# Patient Record
Sex: FEMALE | Race: WHITE | NOT HISPANIC OR LATINO | URBAN - METROPOLITAN AREA
[De-identification: names, ages, dates, MRNs, and addresses within clinical notes are randomized per-mention and may not be internally consistent; named-entity substitution may affect disease eponyms.]

---

## 2018-09-07 ENCOUNTER — INPATIENT (INPATIENT)
Facility: HOSPITAL | Age: 53
LOS: 3 days | Discharge: PSYCHIATRIC OR SUBSTANCE USE FACILITY | End: 2018-09-11
Attending: FAMILY MEDICINE | Admitting: FAMILY MEDICINE
Payer: MEDICARE

## 2018-09-07 VITALS — HEIGHT: 65 IN | WEIGHT: 149.91 LBS

## 2018-09-07 DIAGNOSIS — Z92.3 PERSONAL HISTORY OF IRRADIATION: Chronic | ICD-10-CM

## 2018-09-07 DIAGNOSIS — F25.0 SCHIZOAFFECTIVE DISORDER, BIPOLAR TYPE: ICD-10-CM

## 2018-09-07 LAB
ABO RH CONFIRMATION: SIGNIFICANT CHANGE UP
ACETONE SERPL-MCNC: ABNORMAL
ALBUMIN SERPL ELPH-MCNC: 3.6 G/DL — SIGNIFICANT CHANGE UP (ref 3.3–5)
ALP SERPL-CCNC: 418 U/L — HIGH (ref 40–120)
ALT FLD-CCNC: 123 U/L — HIGH (ref 12–78)
AMPHET UR-MCNC: NEGATIVE — SIGNIFICANT CHANGE UP
ANION GAP SERPL CALC-SCNC: 11 MMOL/L — SIGNIFICANT CHANGE UP (ref 5–17)
ANION GAP SERPL CALC-SCNC: 18 MMOL/L — HIGH (ref 5–17)
APPEARANCE UR: CLEAR — SIGNIFICANT CHANGE UP
APTT BLD: 26.6 SEC — LOW (ref 27.5–37.4)
AST SERPL-CCNC: 69 U/L — HIGH (ref 15–37)
BACTERIA # UR AUTO: ABNORMAL
BARBITURATES UR SCN-MCNC: NEGATIVE — SIGNIFICANT CHANGE UP
BASE EXCESS BLDV CALC-SCNC: -7.5 MMOL/L — LOW (ref -2–2)
BASOPHILS # BLD AUTO: 0.03 K/UL — SIGNIFICANT CHANGE UP (ref 0–0.2)
BASOPHILS NFR BLD AUTO: 0.5 % — SIGNIFICANT CHANGE UP (ref 0–2)
BENZODIAZ UR-MCNC: NEGATIVE — SIGNIFICANT CHANGE UP
BILIRUB SERPL-MCNC: 0.5 MG/DL — SIGNIFICANT CHANGE UP (ref 0.2–1.2)
BILIRUB UR-MCNC: NEGATIVE — SIGNIFICANT CHANGE UP
BLD GP AB SCN SERPL QL: SIGNIFICANT CHANGE UP
BUN SERPL-MCNC: 11 MG/DL — SIGNIFICANT CHANGE UP (ref 7–23)
BUN SERPL-MCNC: 9 MG/DL — SIGNIFICANT CHANGE UP (ref 7–23)
CALCIUM SERPL-MCNC: 8.2 MG/DL — LOW (ref 8.5–10.1)
CALCIUM SERPL-MCNC: 9.7 MG/DL — SIGNIFICANT CHANGE UP (ref 8.5–10.1)
CHLORIDE SERPL-SCNC: 103 MMOL/L — SIGNIFICANT CHANGE UP (ref 96–108)
CHLORIDE SERPL-SCNC: 111 MMOL/L — HIGH (ref 96–108)
CK SERPL-CCNC: 48 U/L — SIGNIFICANT CHANGE UP (ref 26–192)
CO2 SERPL-SCNC: 15 MMOL/L — LOW (ref 22–31)
CO2 SERPL-SCNC: 20 MMOL/L — LOW (ref 22–31)
COCAINE METAB.OTHER UR-MCNC: NEGATIVE — SIGNIFICANT CHANGE UP
COLOR SPEC: YELLOW — SIGNIFICANT CHANGE UP
CREAT SERPL-MCNC: 0.54 MG/DL — SIGNIFICANT CHANGE UP (ref 0.5–1.3)
CREAT SERPL-MCNC: 0.73 MG/DL — SIGNIFICANT CHANGE UP (ref 0.5–1.3)
DIFF PNL FLD: NEGATIVE — SIGNIFICANT CHANGE UP
EOSINOPHIL # BLD AUTO: 0.03 K/UL — SIGNIFICANT CHANGE UP (ref 0–0.5)
EOSINOPHIL NFR BLD AUTO: 0.5 % — SIGNIFICANT CHANGE UP (ref 0–6)
EPI CELLS # UR: SIGNIFICANT CHANGE UP
ETHANOL SERPL-MCNC: <10 MG/DL — SIGNIFICANT CHANGE UP (ref 0–10)
GLUCOSE BLDC GLUCOMTR-MCNC: 152 MG/DL — HIGH (ref 70–99)
GLUCOSE SERPL-MCNC: 224 MG/DL — HIGH (ref 70–99)
GLUCOSE SERPL-MCNC: 388 MG/DL — HIGH (ref 70–99)
GLUCOSE UR QL: 1000 MG/DL
GRAN CASTS # UR COMP ASSIST: ABNORMAL /LPF
HCO3 BLDV-SCNC: 16 MMOL/L — LOW (ref 21–29)
HCT VFR BLD CALC: 40.6 % — SIGNIFICANT CHANGE UP (ref 34.5–45)
HGB BLD-MCNC: 13.7 G/DL — SIGNIFICANT CHANGE UP (ref 11.5–15.5)
HYALINE CASTS # UR AUTO: ABNORMAL /LPF
IMM GRANULOCYTES NFR BLD AUTO: 0.5 % — SIGNIFICANT CHANGE UP (ref 0–1.5)
INR BLD: 1.02 RATIO — SIGNIFICANT CHANGE UP (ref 0.88–1.16)
KETONES UR-MCNC: ABNORMAL
LEUKOCYTE ESTERASE UR-ACNC: ABNORMAL
LYMPHOCYTES # BLD AUTO: 0.93 K/UL — LOW (ref 1–3.3)
LYMPHOCYTES # BLD AUTO: 16.1 % — SIGNIFICANT CHANGE UP (ref 13–44)
MCHC RBC-ENTMCNC: 29.5 PG — SIGNIFICANT CHANGE UP (ref 27–34)
MCHC RBC-ENTMCNC: 33.7 GM/DL — SIGNIFICANT CHANGE UP (ref 32–36)
MCV RBC AUTO: 87.5 FL — SIGNIFICANT CHANGE UP (ref 80–100)
METHADONE UR-MCNC: NEGATIVE — SIGNIFICANT CHANGE UP
MONOCYTES # BLD AUTO: 0.32 K/UL — SIGNIFICANT CHANGE UP (ref 0–0.9)
MONOCYTES NFR BLD AUTO: 5.5 % — SIGNIFICANT CHANGE UP (ref 2–14)
NEUTROPHILS # BLD AUTO: 4.44 K/UL — SIGNIFICANT CHANGE UP (ref 1.8–7.4)
NEUTROPHILS NFR BLD AUTO: 76.9 % — SIGNIFICANT CHANGE UP (ref 43–77)
NITRITE UR-MCNC: NEGATIVE — SIGNIFICANT CHANGE UP
NRBC # BLD: 0 /100 WBCS — SIGNIFICANT CHANGE UP (ref 0–0)
OPIATES UR-MCNC: NEGATIVE — SIGNIFICANT CHANGE UP
PCO2 BLDV: 28 MMHG — LOW (ref 35–50)
PCP SPEC-MCNC: SIGNIFICANT CHANGE UP
PCP UR-MCNC: NEGATIVE — SIGNIFICANT CHANGE UP
PH BLDV: 7.37 — SIGNIFICANT CHANGE UP (ref 7.35–7.45)
PH UR: 5 — SIGNIFICANT CHANGE UP (ref 5–8)
PLATELET # BLD AUTO: 216 K/UL — SIGNIFICANT CHANGE UP (ref 150–400)
PO2 BLDV: 54 MMHG — HIGH (ref 25–45)
POTASSIUM SERPL-MCNC: 3.9 MMOL/L — SIGNIFICANT CHANGE UP (ref 3.5–5.3)
POTASSIUM SERPL-MCNC: 4 MMOL/L — SIGNIFICANT CHANGE UP (ref 3.5–5.3)
POTASSIUM SERPL-SCNC: 3.9 MMOL/L — SIGNIFICANT CHANGE UP (ref 3.5–5.3)
POTASSIUM SERPL-SCNC: 4 MMOL/L — SIGNIFICANT CHANGE UP (ref 3.5–5.3)
PROT SERPL-MCNC: 8.6 GM/DL — HIGH (ref 6–8.3)
PROT UR-MCNC: 100 MG/DL
PROTHROM AB SERPL-ACNC: 11 SEC — SIGNIFICANT CHANGE UP (ref 9.8–12.7)
RBC # BLD: 4.64 M/UL — SIGNIFICANT CHANGE UP (ref 3.8–5.2)
RBC # FLD: 13.5 % — SIGNIFICANT CHANGE UP (ref 10.3–14.5)
RBC CASTS # UR COMP ASSIST: SIGNIFICANT CHANGE UP /HPF (ref 0–4)
SAO2 % BLDV: 86 % — SIGNIFICANT CHANGE UP (ref 67–88)
SODIUM SERPL-SCNC: 136 MMOL/L — SIGNIFICANT CHANGE UP (ref 135–145)
SODIUM SERPL-SCNC: 142 MMOL/L — SIGNIFICANT CHANGE UP (ref 135–145)
SP GR SPEC: 1.02 — SIGNIFICANT CHANGE UP (ref 1.01–1.02)
THC UR QL: NEGATIVE — SIGNIFICANT CHANGE UP
TROPONIN I SERPL-MCNC: <0.015 NG/ML — SIGNIFICANT CHANGE UP (ref 0.01–0.04)
TYPE + AB SCN PNL BLD: SIGNIFICANT CHANGE UP
UROBILINOGEN FLD QL: NEGATIVE MG/DL — SIGNIFICANT CHANGE UP
VALPROATE SERPL-MCNC: <3 UG/ML — LOW (ref 50–100)
WBC # BLD: 5.78 K/UL — SIGNIFICANT CHANGE UP (ref 3.8–10.5)
WBC # FLD AUTO: 5.78 K/UL — SIGNIFICANT CHANGE UP (ref 3.8–10.5)
WBC UR QL: SIGNIFICANT CHANGE UP

## 2018-09-07 PROCEDURE — 90792 PSYCH DIAG EVAL W/MED SRVCS: CPT

## 2018-09-07 PROCEDURE — 70450 CT HEAD/BRAIN W/O DYE: CPT | Mod: 26

## 2018-09-07 PROCEDURE — 99285 EMERGENCY DEPT VISIT HI MDM: CPT

## 2018-09-07 PROCEDURE — 71045 X-RAY EXAM CHEST 1 VIEW: CPT | Mod: 26

## 2018-09-07 RX ORDER — SODIUM CHLORIDE 9 MG/ML
1000 INJECTION INTRAMUSCULAR; INTRAVENOUS; SUBCUTANEOUS ONCE
Qty: 0 | Refills: 0 | Status: COMPLETED | OUTPATIENT
Start: 2018-09-07 | End: 2018-09-07

## 2018-09-07 RX ORDER — SODIUM CHLORIDE 9 MG/ML
3 INJECTION INTRAMUSCULAR; INTRAVENOUS; SUBCUTANEOUS ONCE
Qty: 0 | Refills: 0 | Status: COMPLETED | OUTPATIENT
Start: 2018-09-07 | End: 2018-09-07

## 2018-09-07 RX ORDER — HALOPERIDOL DECANOATE 100 MG/ML
5 INJECTION INTRAMUSCULAR EVERY 6 HOURS
Qty: 0 | Refills: 0 | Status: DISCONTINUED | OUTPATIENT
Start: 2018-09-07 | End: 2018-09-11

## 2018-09-07 RX ORDER — SODIUM CHLORIDE 9 MG/ML
2000 INJECTION INTRAMUSCULAR; INTRAVENOUS; SUBCUTANEOUS ONCE
Qty: 0 | Refills: 0 | Status: COMPLETED | OUTPATIENT
Start: 2018-09-07 | End: 2018-09-07

## 2018-09-07 RX ORDER — INSULIN LISPRO 100/ML
10 VIAL (ML) SUBCUTANEOUS ONCE
Qty: 0 | Refills: 0 | Status: COMPLETED | OUTPATIENT
Start: 2018-09-07 | End: 2018-09-07

## 2018-09-07 RX ORDER — DIPHENHYDRAMINE HCL 50 MG
50 CAPSULE ORAL EVERY 6 HOURS
Qty: 0 | Refills: 0 | Status: DISCONTINUED | OUTPATIENT
Start: 2018-09-07 | End: 2018-09-11

## 2018-09-07 RX ADMIN — SODIUM CHLORIDE 1000 MILLILITER(S): 9 INJECTION INTRAMUSCULAR; INTRAVENOUS; SUBCUTANEOUS at 16:30

## 2018-09-07 RX ADMIN — SODIUM CHLORIDE 2000 MILLILITER(S): 9 INJECTION INTRAMUSCULAR; INTRAVENOUS; SUBCUTANEOUS at 13:54

## 2018-09-07 RX ADMIN — SODIUM CHLORIDE 1000 MILLILITER(S): 9 INJECTION INTRAMUSCULAR; INTRAVENOUS; SUBCUTANEOUS at 17:30

## 2018-09-07 RX ADMIN — Medication 10 UNIT(S): at 15:04

## 2018-09-07 RX ADMIN — SODIUM CHLORIDE 2000 MILLILITER(S): 9 INJECTION INTRAMUSCULAR; INTRAVENOUS; SUBCUTANEOUS at 14:54

## 2018-09-07 NOTE — H&P ADULT - PSH
S/P radiotherapy S/P radiotherapy    Status post chemotherapy  h/o chemoport insertion at left sided abdomen underneath skin

## 2018-09-07 NOTE — ED BEHAVIORAL HEALTH ASSESSMENT NOTE - HPI (INCLUDE ILLNESS QUALITY, SEVERITY, DURATION, TIMING, CONTEXT, MODIFYING FACTORS, ASSOCIATED SIGNS AND SYMPTOMS)
Pt is 51 y/o female with hx of  Schizoaffective disorder, bipolr type,  noncompliant with Depakote ER 250mg 1 tab TID, , haldol Dec injection 50mg IM last injection on 8/16/18, and before that 6/22/ (missed a month),   followed by Dr Reza [phone] (called office, left a message), Spoke with Daysi, clinical director of [facility]. Pt has hx of noncompliance, she is psychotic, noncompliant, excessive spending, Buddhist preoccupation as her baseline.   Medical hx is significant for DM, stage 4 colon cancer new mets to liver with liver enzymes increased.  Per pt's sister, pt is paranoid that people are watching her, she is telling lies, has not been taking care of her body for 2-3 weeks, is delusional, and is feeling tired. Pt has been non-compliant with her metformin for the past month.  Pt denies any acute complaints at this time. She is expansive grandiose, laughing uncontrollably, but responds well to redirection.   She states that she belongs to special  type of women called "medium". That is the morgan kind of women. She is paranoid and endorses hearing voices of her decreased mother, grandmother, also Sheridan and Laura Mueller. Denies CAH. Pt states that she met in the past, but also that she is not Buddhist.   She states meds she takes are  "spookolishes" and "chocoladishes".   Pt denies ever having SI, or SA, no HI.   Multiple hospitalizations at least 6 x on psychiatry, in [facility],. [facility], [facility]. last hospitalizations in 2013. After that in treatment in [facility] and refereed to [facility] where is she in treatement now.   Therapist is Daysi Munson [phone]. 'family learned about liver metastases yesterday,.   OPT resides with her 22 YO son with Down sy and 16 YO son. her  is with children.

## 2018-09-07 NOTE — ED BEHAVIORAL HEALTH ASSESSMENT NOTE - NS ED [facility] PLAN ADMIT TO [facility] CONTACTED FT
Called [facility],  Dr Reza not available, case discussed width Daysi clinical director.

## 2018-09-07 NOTE — ED ADULT NURSE NOTE - OBJECTIVE STATEMENT
Pt presents to the Ed with [employer] for psych eval Pt presents to the Ed with [employer] for psych eval. pt's sister contacted the police  because pt stopped taking her psych meds.

## 2018-09-07 NOTE — ED BEHAVIORAL HEALTH ASSESSMENT NOTE - RISK ASSESSMENT
Pt is not at imminent risk to harm self  and others. However she is psychotic and manic and can not care for herself.

## 2018-09-07 NOTE — H&P ADULT - HISTORY OF PRESENT ILLNESS
53 y/o F w/Schizoaffective disorder bipolar type, DM2, Stage 4 colon cancer with mets to the liver, corneal ulcer was brought to the ED by law enforcement with the complaints of erratic behaviour, hostility and aggressiveness towards . As per pt she checked her blood glucose and was 345 at home and is the only reason she is here.   As per , pt stopped taking all her medications and stopped following up with oncologist and endocrinologist because she believed she has been cured and has no medical issues. Today, pt locked  out and was aggressive to herself and family and wouldn't let anyone near. Pt agrees to been cured of all medical conditions and endorses auditory hallucinations which she describes as jessi voices telling her stories. Pt have inappropriate laughters which she relates to the stories her dead family are telling her as I was in the room. Pt denies headaches, blurry vision or vision changes, CP, SOB, palpitations, abdominal pain, fever, chills, N/V, diarrhea or constipation dysuria, urgency. Pt reports increase urinary frequency which has been ongoing for years.    In the ED, pt is s/p 3L NS bolus, benadryl, haloperidol, humalog.   Labs, CT scan and CXR were obtained. 51 y/o F w/Schizoaffective disorder bipolar type, DM2, Stage 4 colon cancer with new mets to the liver s/p chemo 5yrs ago and radiation, corneal ulcer was brought to the ED by law enforcement with the complaints of erratic behaviour, hostility and aggressiveness towards . As per pt she checked her blood glucose and was 345 at home and is the only reason she is here.   As per , pt stopped taking all her medications and stopped following up with oncologist and endocrinologist because she believed she has been cured and has no medical issues. Today, pt locked  out and was aggressive to herself and family and wouldn't let anyone near. Pt agrees to been cured of all medical conditions and endorses auditory hallucinations which she describes as jessi voices telling her stories. Pt have inappropriate laughters which she relates to the stories her dead family are telling her as I was in the room. Pt denies headaches, blurry vision or vision changes, CP, SOB, palpitations, abdominal pain, fever, chills, N/V, diarrhea or constipation dysuria, urgency. Pt reports increase urinary frequency which has been ongoing for years.    In the ED, pt is s/p 3L NS bolus, Depakote, humalog.   Labs, CT scan and CXR were obtained.

## 2018-09-07 NOTE — ED BEHAVIORAL HEALTH ASSESSMENT NOTE - DETAILS
22 YO SON with Down syndrome Mother had schizophrenia C&L team adiel f/u Coordinated with ER attending

## 2018-09-07 NOTE — H&P ADULT - NSHPOUTPATIENTPROVIDERS_GEN_ALL_CORE
PCP: Dr. Reza PCP: Dr. Reza  Oncologist: Dr. Bueno at [facility]  Endo: Dr. Almendarez PCP: Dr. Reza  Oncologist: Dr. Bueno at [facility]  Endo: Dr. Almendarez

## 2018-09-07 NOTE — H&P ADULT - ATTENDING COMMENTS
Patient seen and examined with resident physician Mala Mora. I personally had a face-to-face encounter with the patient, examined the patient myself and reviewed the plan of care with the patient and Resident Mala Mora. I agree with the assessment and plan of Resident Mala Mora as stated and discussed.    This is a 52 y.o. female with PMH schizoaffective disorder bipolar type, DM2, Stage 4 colon cancer with mets to the liver, corneal ulcer was brought to the ED by law enforcement with the complaints of erratic behaviour, hostility and aggressiveness towards .     #mild DKA-resolved  #DM2 uncontrolled hyperglycemia  #medication noncompliance  -admit to med surg  -iv hydration  -fingerstick monitoring and ISS  -check A1C  -hold glimepiride  -pt states on levemir 60 units. Appears high dose as pt's BGM decreased from 388 to 152 with lispro 10 units. Will hold levemir. Cont sliding scale.    #stage IV colon cancer with mets to liver, transaminitis  -trend LRTs  -onc consult, Dr George    #schizoaffective disorder, bipolar type  #auditory hallucination  -cont 1:1 constant observation  -cont depakote  -pt placed on benadryl, haldol, and ativan PRN by psych  -f/u psych recommendations  -EKG: NSR HR 75, LVH, QTc 455

## 2018-09-07 NOTE — ED BEHAVIORAL HEALTH ASSESSMENT NOTE - SUMMARY
52 YOWW with schizoaffective disorder bipolar Type. with colon cancer 4th stage with newly discovered liver mets, presents to ER psychotic, manic , in the light of noncompliance and partial compliance. She gets haldol dec injection and last one was on 8/16/18, but she is not compliant with other meds.   PT is not suicidal or homicidal. But she si disorganized and cannot care for herself.   care coordinated withy ER attending. They are still completing medical workup and will admit pt to medicine. As per FSL pt presents the same way for at least 1 year and the only change in behavior is that she stopped attending her group psychotherapy sessions.       [facility] plan is astrid keep pt on 1;1 and continue haldol dec. next one is due on 9/13/18.  Would do CT head and check ammonia level.     C&L will f/u

## 2018-09-07 NOTE — H&P ADULT - NEGATIVE MUSCULOSKELETAL SYMPTOMS
no joint swelling/no leg pain L/no arthralgia/no muscle weakness/no back pain/no leg pain R/no muscle cramps/no stiffness/no neck pain/no arm pain L/no arm pain R

## 2018-09-07 NOTE — ED PROVIDER NOTE - OBJECTIVE STATEMENT
53 y/o female with PMHX of bipolar disorder, DM, stage 4 colon cancer presents to the ED regarding erratic behavior. Per pt's sister, pt is paranoid that people are watching her, is telling lies, has not been taking care of her body for 2-3 weeks, is delusional, and is feeling tired. Pt has been non-compliant with her insulin for the past month. Pt's sister is not sure if pt has been compliant with her Depakote. Pt's sister states that pt's BGM was 345 today. Pt denies any acute complaints at this time. PMD: Dr. Reza.

## 2018-09-07 NOTE — ED ADULT TRIAGE NOTE - CHIEF COMPLAINT QUOTE
Patient presents with [employer] and EMS reports erratic behavior, sister reports she thinks she has stopped taking her psych meds. Patient in handcuffs at triage, unable to obtain vital signs

## 2018-09-07 NOTE — H&P ADULT - FAMILY HISTORY
Mother  Still living? No  Family history of schizophrenia, Age at diagnosis: Age Unknown  Family history of colon cancer in mother, Age at diagnosis: Age Unknown     Father  Still living? Yes, Estimated age: Age Unknown  Family history of heart disease, Age at diagnosis: Age Unknown     Child  Still living? Yes, Estimated age: Age Unknown  Family history of Down syndrome, Age at diagnosis: Age Unknown  Family history of autism, Age at diagnosis: Age Unknown

## 2018-09-07 NOTE — ED BEHAVIORAL HEALTH ASSESSMENT NOTE - AXIS III
Medical hx is significant for DM, stage 4 colon cancer new mets to liver with liver enzymes increased.

## 2018-09-07 NOTE — H&P ADULT - NEGATIVE NEUROLOGICAL SYMPTOMS
no transient paralysis/no weakness/no loss of sensation/no headache/no confusion/no difficulty walking

## 2018-09-07 NOTE — H&P ADULT - PMH
Colon cancer  Stage 4 with liver mets  Corneal ulcer    Diabetes mellitus    Schizoaffective disorder, bipolar type

## 2018-09-07 NOTE — H&P ADULT - ASSESSMENT
#Admit to Med/Surg    #Hyperglycemia 2/2 medication non compliance for DM  - UA positive for large ketones and glucose  - S/p 3L NS bolus  - S/p Humalog 10units  - Continue Insulin  - Continue ISS  - F/u HbA1c  - F/u CBC, CMP.   - POCT glucose checks   - Continue to monitor    #Schizoaffective disorder bipolar type  - Uncontrolled (pt in active psychosis) 2/2 medication non compliance  - Valproic acid level <3  - Utox negative  - Psych consult appreciated  - CT head was negative  - F/u Ammonia levels am  - Continue constant observation with 1:1.  - Continue to F/u psych consult  - Continue Haldol 5mg q6h PRN  - Continue Ativan 2mg q6h PRN  - Continue Depakote 750mg daily  - Continue Haldol 50mg inj monthly  - Continue to monitor    #Elevated LFTs  - Has stage 4 Colon Cancer with new liver mets (follows up as [facility] but believes she is cured and does not follow up anymore)  - F/u oncology consult  - F/u palliative consult    #DVT Prophylaxis  - heparin sq    IMPROVE VTE Individual Risk Assessment  RISK                                                                Points  [  ] Previous VTE                                                  3  [  ] Thrombophilia                                               2  [  ] Lower limb paralysis                                      2        (unable to hold up >15 seconds)    [*] Current Cancer                                              2         (within 6 months)  [  ] Immobilization > 24 hrs                                1  [  ] ICU/CCU stay > 24 hours                              1  [  ] Age > 60                                                      1  IMPROVE VTE Score __2_______ #Admit to Med/Surg    #Hyperglycemia 2/2 medication non compliance for DM  - UA positive for large ketones and glucose  - S/p 3L NS bolus  - S/p Humalog 10units  - Clarify Levemir dose in AM  - Continue ISS  - F/u HbA1c  - F/u CBC, CMP.   - POCT glucose checks   - Continue to monitor    #Schizoaffective disorder bipolar type  - Uncontrolled (pt in active psychosis) 2/2 medication non compliance  - Valproic acid level <3  - Utox negative  - Psych consult appreciated  - CT head was negative  - F/u Ammonia levels am  - Continue constant observation with 1:1.  - Continue to F/u psych consult  - Continue Haldol, Ativan and benadryl PRN as per psych.   - Continue Depakote 750mg daily  - Continue Haldol 50mg inj monthly  - Continue to monitor    #Elevated LFTs  - Has stage 4 Colon Cancer with new liver mets (follows up as [facility] but believes she is cured and does not follow up anymore)  - F/u oncology consult  - F/u palliative consult    #DVT Prophylaxis  - heparin sq    IMPROVE VTE Individual Risk Assessment  RISK                                                                Points  [  ] Previous VTE                                                  3  [  ] Thrombophilia                                               2  [  ] Lower limb paralysis                                      2        (unable to hold up >15 seconds)    [*] Current Cancer                                              2         (within 6 months)  [  ] Immobilization > 24 hrs                                1  [  ] ICU/CCU stay > 24 hours                              1  [  ] Age > 60                                                      1  IMPROVE VTE Score __2_______

## 2018-09-07 NOTE — H&P ADULT - RS GEN PE MLT RESP DETAILS PC
good air movement/normal/respirations non-labored/breath sounds equal/airway patent/clear to auscultation bilaterally

## 2018-09-07 NOTE — H&P ADULT - NSHPSOCIALHISTORY_GEN_ALL_CORE
, live w/ and 2 sons.  worked as a  for [employer] for 24yrs. Currently unemployed.  Nonsmoker, non alcoholic, no illicit drugs.

## 2018-09-07 NOTE — H&P ADULT - NEGATIVE OPHTHALMOLOGIC SYMPTOMS
no blurred vision L/no pain L/no irritation L/no irritation R/no loss of vision R/no diplopia/no blurred vision R/no discharge L/no pain R/no loss of vision L/no scleral injection L/no scleral injection R

## 2018-09-07 NOTE — H&P ADULT - NEGATIVE GASTROINTESTINAL SYMPTOMS
no diarrhea/no change in bowel habits/no melena/no jaundice/no abdominal pain/no hematochezia/no nausea/no vomiting/no constipation

## 2018-09-07 NOTE — ED BEHAVIORAL HEALTH ASSESSMENT NOTE - CURRENT MEDICATION
noncompliant with depakote ER 250mg PO TID and on haldol Dec injection 50mg IM last injection on 8/16/18, and before that 6/22/ (missed a month),

## 2018-09-07 NOTE — H&P ADULT - MUSCULOSKELETAL
details… detailed exam ROM intact/no joint erythema/no joint swelling/no joint warmth/no calf tenderness/normal strength

## 2018-09-07 NOTE — ED BEHAVIORAL HEALTH ASSESSMENT NOTE - DESCRIPTION
Pt is cooperative, but psychotic, inappropriate  laughing, in denial of her medical condition,. see HPI resides with 2 sons; one with Down sy

## 2018-09-08 DIAGNOSIS — Z92.21 PERSONAL HISTORY OF ANTINEOPLASTIC CHEMOTHERAPY: Chronic | ICD-10-CM

## 2018-09-08 LAB
ALBUMIN SERPL ELPH-MCNC: 2.8 G/DL — LOW (ref 3.3–5)
ALP SERPL-CCNC: 314 U/L — HIGH (ref 40–120)
ALT FLD-CCNC: 96 U/L — HIGH (ref 12–78)
AMMONIA BLD-MCNC: 19 UMOL/L — SIGNIFICANT CHANGE UP (ref 11–32)
ANION GAP SERPL CALC-SCNC: 12 MMOL/L — SIGNIFICANT CHANGE UP (ref 5–17)
ANION GAP SERPL CALC-SCNC: 14 MMOL/L — SIGNIFICANT CHANGE UP (ref 5–17)
AST SERPL-CCNC: 67 U/L — HIGH (ref 15–37)
BASOPHILS # BLD AUTO: 0.03 K/UL — SIGNIFICANT CHANGE UP (ref 0–0.2)
BASOPHILS NFR BLD AUTO: 0.6 % — SIGNIFICANT CHANGE UP (ref 0–2)
BILIRUB SERPL-MCNC: 0.4 MG/DL — SIGNIFICANT CHANGE UP (ref 0.2–1.2)
BUN SERPL-MCNC: 8 MG/DL — SIGNIFICANT CHANGE UP (ref 7–23)
BUN SERPL-MCNC: 8 MG/DL — SIGNIFICANT CHANGE UP (ref 7–23)
CALCIUM SERPL-MCNC: 8.5 MG/DL — SIGNIFICANT CHANGE UP (ref 8.5–10.1)
CALCIUM SERPL-MCNC: 9.2 MG/DL — SIGNIFICANT CHANGE UP (ref 8.5–10.1)
CHLORIDE SERPL-SCNC: 106 MMOL/L — SIGNIFICANT CHANGE UP (ref 96–108)
CHLORIDE SERPL-SCNC: 107 MMOL/L — SIGNIFICANT CHANGE UP (ref 96–108)
CO2 SERPL-SCNC: 17 MMOL/L — LOW (ref 22–31)
CO2 SERPL-SCNC: 19 MMOL/L — LOW (ref 22–31)
CREAT SERPL-MCNC: 0.5 MG/DL — SIGNIFICANT CHANGE UP (ref 0.5–1.3)
CREAT SERPL-MCNC: 0.66 MG/DL — SIGNIFICANT CHANGE UP (ref 0.5–1.3)
EOSINOPHIL # BLD AUTO: 0.06 K/UL — SIGNIFICANT CHANGE UP (ref 0–0.5)
EOSINOPHIL NFR BLD AUTO: 1.3 % — SIGNIFICANT CHANGE UP (ref 0–6)
GLUCOSE BLDC GLUCOMTR-MCNC: 232 MG/DL — HIGH (ref 70–99)
GLUCOSE BLDC GLUCOMTR-MCNC: 257 MG/DL — HIGH (ref 70–99)
GLUCOSE BLDC GLUCOMTR-MCNC: 339 MG/DL — HIGH (ref 70–99)
GLUCOSE BLDC GLUCOMTR-MCNC: 394 MG/DL — HIGH (ref 70–99)
GLUCOSE SERPL-MCNC: 214 MG/DL — HIGH (ref 70–99)
GLUCOSE SERPL-MCNC: 407 MG/DL — HIGH (ref 70–99)
HBA1C BLD-MCNC: 13.9 % — HIGH (ref 4–5.6)
HCT VFR BLD CALC: 34 % — LOW (ref 34.5–45)
HGB BLD-MCNC: 11.4 G/DL — LOW (ref 11.5–15.5)
IMM GRANULOCYTES NFR BLD AUTO: 0.6 % — SIGNIFICANT CHANGE UP (ref 0–1.5)
LYMPHOCYTES # BLD AUTO: 1.46 K/UL — SIGNIFICANT CHANGE UP (ref 1–3.3)
LYMPHOCYTES # BLD AUTO: 30.6 % — SIGNIFICANT CHANGE UP (ref 13–44)
MCHC RBC-ENTMCNC: 30.2 PG — SIGNIFICANT CHANGE UP (ref 27–34)
MCHC RBC-ENTMCNC: 33.5 GM/DL — SIGNIFICANT CHANGE UP (ref 32–36)
MCV RBC AUTO: 89.9 FL — SIGNIFICANT CHANGE UP (ref 80–100)
MONOCYTES # BLD AUTO: 0.32 K/UL — SIGNIFICANT CHANGE UP (ref 0–0.9)
MONOCYTES NFR BLD AUTO: 6.7 % — SIGNIFICANT CHANGE UP (ref 2–14)
NEUTROPHILS # BLD AUTO: 2.87 K/UL — SIGNIFICANT CHANGE UP (ref 1.8–7.4)
NEUTROPHILS NFR BLD AUTO: 60.2 % — SIGNIFICANT CHANGE UP (ref 43–77)
NRBC # BLD: 0 /100 WBCS — SIGNIFICANT CHANGE UP (ref 0–0)
PLATELET # BLD AUTO: 193 K/UL — SIGNIFICANT CHANGE UP (ref 150–400)
POTASSIUM SERPL-MCNC: 3.6 MMOL/L — SIGNIFICANT CHANGE UP (ref 3.5–5.3)
POTASSIUM SERPL-MCNC: 3.7 MMOL/L — SIGNIFICANT CHANGE UP (ref 3.5–5.3)
POTASSIUM SERPL-SCNC: 3.6 MMOL/L — SIGNIFICANT CHANGE UP (ref 3.5–5.3)
POTASSIUM SERPL-SCNC: 3.7 MMOL/L — SIGNIFICANT CHANGE UP (ref 3.5–5.3)
PROT SERPL-MCNC: 6.7 GM/DL — SIGNIFICANT CHANGE UP (ref 6–8.3)
RBC # BLD: 3.78 M/UL — LOW (ref 3.8–5.2)
RBC # FLD: 13.7 % — SIGNIFICANT CHANGE UP (ref 10.3–14.5)
SODIUM SERPL-SCNC: 137 MMOL/L — SIGNIFICANT CHANGE UP (ref 135–145)
SODIUM SERPL-SCNC: 138 MMOL/L — SIGNIFICANT CHANGE UP (ref 135–145)
WBC # BLD: 4.77 K/UL — SIGNIFICANT CHANGE UP (ref 3.8–10.5)
WBC # FLD AUTO: 4.77 K/UL — SIGNIFICANT CHANGE UP (ref 3.8–10.5)

## 2018-09-08 PROCEDURE — 93010 ELECTROCARDIOGRAM REPORT: CPT

## 2018-09-08 RX ORDER — INSULIN LISPRO 100/ML
VIAL (ML) SUBCUTANEOUS AT BEDTIME
Qty: 0 | Refills: 0 | Status: DISCONTINUED | OUTPATIENT
Start: 2018-09-08 | End: 2018-09-11

## 2018-09-08 RX ORDER — DIVALPROEX SODIUM 500 MG/1
750 TABLET, DELAYED RELEASE ORAL DAILY
Qty: 0 | Refills: 0 | Status: DISCONTINUED | OUTPATIENT
Start: 2018-09-08 | End: 2018-09-11

## 2018-09-08 RX ORDER — INSULIN GLARGINE 100 [IU]/ML
15 INJECTION, SOLUTION SUBCUTANEOUS AT BEDTIME
Qty: 0 | Refills: 0 | Status: DISCONTINUED | OUTPATIENT
Start: 2018-09-08 | End: 2018-09-11

## 2018-09-08 RX ORDER — LIRAGLUTIDE 6 MG/ML
0 INJECTION SUBCUTANEOUS
Qty: 0 | Refills: 0 | COMMUNITY

## 2018-09-08 RX ORDER — INSULIN LISPRO 100/ML
VIAL (ML) SUBCUTANEOUS
Qty: 0 | Refills: 0 | Status: DISCONTINUED | OUTPATIENT
Start: 2018-09-08 | End: 2018-09-11

## 2018-09-08 RX ORDER — SODIUM CHLORIDE 9 MG/ML
1000 INJECTION INTRAMUSCULAR; INTRAVENOUS; SUBCUTANEOUS
Qty: 0 | Refills: 0 | Status: DISCONTINUED | OUTPATIENT
Start: 2018-09-08 | End: 2018-09-11

## 2018-09-08 RX ORDER — SODIUM CHLORIDE 9 MG/ML
1000 INJECTION, SOLUTION INTRAVENOUS
Qty: 0 | Refills: 0 | Status: DISCONTINUED | OUTPATIENT
Start: 2018-09-08 | End: 2018-09-11

## 2018-09-08 RX ORDER — HEPARIN SODIUM 5000 [USP'U]/ML
5000 INJECTION INTRAVENOUS; SUBCUTANEOUS EVERY 12 HOURS
Qty: 0 | Refills: 0 | Status: DISCONTINUED | OUTPATIENT
Start: 2018-09-08 | End: 2018-09-11

## 2018-09-08 RX ORDER — DEXTROSE 50 % IN WATER 50 %
25 SYRINGE (ML) INTRAVENOUS ONCE
Qty: 0 | Refills: 0 | Status: DISCONTINUED | OUTPATIENT
Start: 2018-09-08 | End: 2018-09-11

## 2018-09-08 RX ORDER — LISINOPRIL 2.5 MG/1
0 TABLET ORAL
Qty: 0 | Refills: 0 | COMMUNITY

## 2018-09-08 RX ORDER — LISINOPRIL 2.5 MG/1
5 TABLET ORAL DAILY
Qty: 0 | Refills: 0 | Status: DISCONTINUED | OUTPATIENT
Start: 2018-09-08 | End: 2018-09-11

## 2018-09-08 RX ORDER — DEXTROSE 50 % IN WATER 50 %
12.5 SYRINGE (ML) INTRAVENOUS ONCE
Qty: 0 | Refills: 0 | Status: DISCONTINUED | OUTPATIENT
Start: 2018-09-08 | End: 2018-09-11

## 2018-09-08 RX ORDER — GLIMEPIRIDE 1 MG
0 TABLET ORAL
Qty: 0 | Refills: 0 | COMMUNITY

## 2018-09-08 RX ORDER — INSULIN LISPRO 100/ML
VIAL (ML) SUBCUTANEOUS AT BEDTIME
Qty: 0 | Refills: 0 | Status: DISCONTINUED | OUTPATIENT
Start: 2018-09-08 | End: 2018-09-08

## 2018-09-08 RX ORDER — HALOPERIDOL DECANOATE 100 MG/ML
0 INJECTION INTRAMUSCULAR
Qty: 0 | Refills: 0 | COMMUNITY

## 2018-09-08 RX ORDER — GLUCAGON INJECTION, SOLUTION 0.5 MG/.1ML
1 INJECTION, SOLUTION SUBCUTANEOUS ONCE
Qty: 0 | Refills: 0 | Status: DISCONTINUED | OUTPATIENT
Start: 2018-09-08 | End: 2018-09-11

## 2018-09-08 RX ORDER — DEXTROSE 50 % IN WATER 50 %
15 SYRINGE (ML) INTRAVENOUS ONCE
Qty: 0 | Refills: 0 | Status: DISCONTINUED | OUTPATIENT
Start: 2018-09-08 | End: 2018-09-11

## 2018-09-08 RX ORDER — INSULIN LISPRO 100/ML
VIAL (ML) SUBCUTANEOUS
Qty: 0 | Refills: 0 | Status: DISCONTINUED | OUTPATIENT
Start: 2018-09-08 | End: 2018-09-08

## 2018-09-08 RX ADMIN — INSULIN GLARGINE 15 UNIT(S): 100 INJECTION, SOLUTION SUBCUTANEOUS at 21:33

## 2018-09-08 RX ADMIN — Medication 3: at 21:32

## 2018-09-08 RX ADMIN — Medication 2: at 07:58

## 2018-09-08 RX ADMIN — LISINOPRIL 5 MILLIGRAM(S): 2.5 TABLET ORAL at 07:00

## 2018-09-08 RX ADMIN — HEPARIN SODIUM 5000 UNIT(S): 5000 INJECTION INTRAVENOUS; SUBCUTANEOUS at 17:46

## 2018-09-08 RX ADMIN — DIVALPROEX SODIUM 750 MILLIGRAM(S): 500 TABLET, DELAYED RELEASE ORAL at 07:00

## 2018-09-08 RX ADMIN — Medication 3: at 12:03

## 2018-09-08 RX ADMIN — SODIUM CHLORIDE 100 MILLILITER(S): 9 INJECTION INTRAMUSCULAR; INTRAVENOUS; SUBCUTANEOUS at 04:14

## 2018-09-08 RX ADMIN — HEPARIN SODIUM 5000 UNIT(S): 5000 INJECTION INTRAVENOUS; SUBCUTANEOUS at 06:51

## 2018-09-08 RX ADMIN — Medication 4: at 17:14

## 2018-09-08 NOTE — PROGRESS NOTE ADULT - SUBJECTIVE AND OBJECTIVE BOX
CHIEF COMPLAINT: Psychosis/Hyperglycemia    SUBJECTIVE: HPI:  53 y/o F w/Schizoaffective disorder bipolar type, DM2, Stage 4 colon cancer with new mets to the liver s/p chemo 5yrs ago and radiation, corneal ulcer was brought to the ED by law enforcement with the complaints of erratic behaviour, hostility and aggressiveness towards . As per pt she checked her blood glucose and was 345 at home and is the only reason she is here.   As per , pt stopped taking all her medications and stopped following up with oncologist and endocrinologist because she believed she has been cured and has no medical issues. Today, pt locked  out and was aggressive to herself and family and wouldn't let anyone near. Pt agrees to been cured of all medical conditions and endorses auditory hallucinations which she describes as jessi voices telling her stories. Pt have inappropriate laughters which she relates to the stories her dead family are telling her as I was in the room. Pt denies headaches, blurry vision or vision changes, CP, SOB, palpitations, abdominal pain, fever, chills, N/V, diarrhea or constipation dysuria, urgency. Pt reports increase urinary frequency which has been ongoing for years.    In the ED, pt is s/p 3L NS bolus, Depakote, humalog.   Labs, CT scan and CXR were obtained. (07 Sep 2018 23:28)    9/8: Pt was seen and examined at bedside this morning.  She has no acute complaints. As per 1:1, pt has had no issues.     REVIEW OF SYSTEMS:  CONSTITUTIONAL: No weakness, fevers or chills  EYES/ENT: No visual changes;  No vertigo or throat pain   NECK: No pain or stiffness  RESPIRATORY: No cough, wheezing, hemoptysis; No shortness of breath  CARDIOVASCULAR: No chest pain or palpitations  GASTROINTESTINAL: No abdominal or epigastric pain. No nausea, vomiting, or hematemesis; No diarrhea or constipation. No melena or hematochezia.  GENITOURINARY: No dysuria, frequency or hematuria  NEUROLOGICAL: No numbness or weakness  SKIN: No itching, burning, rashes, or lesions   All other review of systems is negative unless indicated above    Vital Signs Last 24 Hrs  T(C): 36.4 (08 Sep 2018 04:09), Max: 37.2 (07 Sep 2018 20:22)  T(F): 97.6 (08 Sep 2018 04:09), Max: 99 (07 Sep 2018 20:22)  HR: 71 (08 Sep 2018 04:09) (71 - 85)  BP: 104/61 (08 Sep 2018 04:09) (102/68 - 144/93)  RR: 18 (08 Sep 2018 04:09) (18 - 18)  SpO2: 97% (08 Sep 2018 04:09) (97% - 100%)    CAPILLARY BLOOD GLUCOSE  POCT Blood Glucose.: 339 mg/dL (08 Sep 2018 16:52)  POCT Blood Glucose.: 257 mg/dL (08 Sep 2018 11:45)  POCT Blood Glucose.: 232 mg/dL (08 Sep 2018 07:53)  POCT Blood Glucose.: 152 mg/dL (07 Sep 2018 20:15)      PHYSICAL EXAM:  Constitutional: middle aged woman in NAD,  awake and alert, well-developed  HEENT: PERR, EOMI, Normal Hearing, MMM  Neck: Soft and supple, No LAD, No JVD  Respiratory: Breath sounds are clear bilaterally, No wheezing, rales or rhonchi  Cardiovascular: S1 and S2, regular rate and rhythm, no Murmurs, gallops or rubs  Gastrointestinal: Bowel Sounds present, soft, nontender, nondistended, no guarding, no rebound  Extremities: No peripheral edema  Vascular: 2+ peripheral pulses  Neurological: A/O x 3, no focal deficits  Musculoskeletal: 5/5 strength b/l upper and lower extremities  Skin: Chemoport on left abdomen    MEDICATIONS:  MEDICATIONS  (STANDING):  dextrose 5%. 1000 milliLiter(s) (50 mL/Hr) IV Continuous <Continuous>  dextrose 50% Injectable 12.5 Gram(s) IV Push once  dextrose 50% Injectable 25 Gram(s) IV Push once  dextrose 50% Injectable 25 Gram(s) IV Push once  diVALproex  milliGRAM(s) Oral daily  heparin  Injectable 5000 Unit(s) SubCutaneous every 12 hours  insulin lispro (HumaLOG) corrective regimen sliding scale   SubCutaneous three times a day before meals  insulin lispro (HumaLOG) corrective regimen sliding scale   SubCutaneous at bedtime  lisinopril 5 milliGRAM(s) Oral daily  sodium chloride 0.9%. 1000 milliLiter(s) (100 mL/Hr) IV Continuous <Continuous>      LABS: All Labs Reviewed:                        11.4   4.77  )-----------( 193      ( 08 Sep 2018 07:10 )             34.0     09-08    138  |  107  |  8   ----------------------------<  214<H>  3.7   |  17<L>  |  0.50    Ca    8.5      08 Sep 2018 07:10    TPro  6.7  /  Alb  2.8<L>  /  TBili  0.4  /  DBili  x   /  AST  67<H>  /  ALT  96<H>  /  AlkPhos  314<H>  09-08    PT/INR - ( 07 Sep 2018 13:46 )   PT: 11.0 sec;   INR: 1.02 ratio         PTT - ( 07 Sep 2018 13:46 )  PTT:26.6 sec  CARDIAC MARKERS ( 07 Sep 2018 13:46 )  <0.015 ng/mL / x     / 48 U/L / x     / x          RADIOLOGY/EKG: < from: CT Head No Cont (09.07.18 @ 15:47) >  IMPRESSION:       No acute intracranial findings.    < from: Xray Chest 1 View AP/PA. (09.07.18 @ 14:24) >  IMPRESSION:    Clear lungs      DVT PPX: Heparin SQ

## 2018-09-08 NOTE — PATIENT PROFILE ADULT. - PSH
S/P radiotherapy    Status post chemotherapy  h/o chemoport insertion at left sided abdomen underneath skin

## 2018-09-08 NOTE — PROGRESS NOTE ADULT - ASSESSMENT
53 yo F as described above admitted for hyperglycemia in setting of an acute psychotic episode.     #Hyperglycemia 2/2 medication non compliance for DM  - UA positive for large ketones and glucose  - S/p 3L NS bolus in ED  - S/p Humalog 10units in ED  - Hgb A1c = 13.9% (9/8/18)  - Clarify Levemir dose with PCP  - ISS  - POCT glucose checks   - Consistent Carb diet  - Continue to monitor    #Schizoaffective disorder bipolar type  - Uncontrolled (pt in active psychosis) 2/2 medication non compliance  - Valproic acid level <3  - Utox negative  - Psych consult appreciated  - CT head was negative  - Ammonia levels 19 (9/8/18)  - Continue constant observation with 1:1.  - Continue to F/u psych consult  - Continue Haldol 5, Ativan 2 and benadryl PRN as per psych.   - Continue Depakote 750mg daily  - Continue Haldol 50mg inj monthly (next dose to be administered on 9/13/18; ast naranjo)  - Continue to monitor    #Elevated LFTs  - Has stage 4 Colon Cancer with new liver mets (follows up as [facility] but believes she is cured and does not follow up anymore)  - F/u oncology consult  - F/u palliative consult    #DVT Prophylaxis  - heparin sq    IMPROVE VTE Individual Risk Assessment  RISK                                                                Points  [  ] Previous VTE                                                  3  [  ] Thrombophilia                                               2  [  ] Lower limb paralysis                                      2        (unable to hold up >15 seconds)    [*] Current Cancer                                              2         (within 6 months)  [  ] Immobilization > 24 hrs                                1  [  ] ICU/CCU stay > 24 hours                              1  [  ] Age > 60                                                      1  IMPROVE VTE Score __2_______ 53 yo F as described above admitted for hyperglycemia in setting of an acute psychotic episode.     #Hyperglycemia 2/2 medication non compliance for DM  - UA positive for large ketones and glucose  - S/p 3L NS bolus in ED  - S/p Humalog 10units in ED  - Hgb A1c = 13.9% (9/8/18)  - Clarify Levemir dose with PCP  - Lantus 15U qHS   - ISS  - POCT glucose checks   - Consistent Carb diet  - Continue to monitor    #Schizoaffective disorder bipolar type  - Uncontrolled (pt in active psychosis) 2/2 medication non compliance  - Valproic acid level <3  - Utox negative  - Psych consult appreciated  - CT head was negative  - Ammonia levels 19 (9/8/18)  - Continue constant observation with 1:1.  - Continue to F/u psych consult  - Continue Haldol 5, Ativan 2 and benadryl PRN as per psych.   - Continue Depakote 750mg daily  - Continue Haldol 50mg inj monthly (next dose to be administered on 9/13/18; ast naranjo)  - Continue to monitor    #Elevated LFTs  - Has stage 4 Colon Cancer with new liver mets (follows up as [facility] but believes she is cured and does not follow up anymore)  - F/u oncology consult  - F/u palliative consult    #DVT Prophylaxis  - heparin sq    IMPROVE VTE Individual Risk Assessment  RISK                                                                Points  [  ] Previous VTE                                                  3  [  ] Thrombophilia                                               2  [  ] Lower limb paralysis                                      2        (unable to hold up >15 seconds)    [*] Current Cancer                                              2         (within 6 months)  [  ] Immobilization > 24 hrs                                1  [  ] ICU/CCU stay > 24 hours                              1  [  ] Age > 60                                                      1  IMPROVE VTE Score __2_______

## 2018-09-08 NOTE — ED ADULT NURSE REASSESSMENT NOTE - NS ED NURSE REASSESS COMMENT FT1
Alpa (sister) [phone], Deanna (sister)[phone], oJno ()[phone]
belongings with family, wanded by security, 1:1 continues
Pt is resting quietly in bed. On 1:1 constant observation. Pending hospitalist orders for admission at this time.
Endorsed by Deanna MALIK. Received pt awake, sitting erect in bed, watching tv. Currently pt is A&Ox3, cooperative, calm, making eye contact. Denies SI/HI at this time. Observed ambulating with steady gait to bathroom. Maintained on 1:1 safety watch. No acute distress noted at this time. Pending hospitalist from . Plan of care includes admission to [facility] for hyperglycemia and transfer to inpatient psych once medically cleared. Instructed family regarding plan

## 2018-09-09 DIAGNOSIS — R74.0 NONSPECIFIC ELEVATION OF LEVELS OF TRANSAMINASE AND LACTIC ACID DEHYDROGENASE [LDH]: ICD-10-CM

## 2018-09-09 DIAGNOSIS — Z29.9 ENCOUNTER FOR PROPHYLACTIC MEASURES, UNSPECIFIED: ICD-10-CM

## 2018-09-09 DIAGNOSIS — R73.9 HYPERGLYCEMIA, UNSPECIFIED: ICD-10-CM

## 2018-09-09 LAB
ALBUMIN SERPL ELPH-MCNC: 3 G/DL — LOW (ref 3.3–5)
ALP SERPL-CCNC: 349 U/L — HIGH (ref 40–120)
ALT FLD-CCNC: 96 U/L — HIGH (ref 12–78)
ANION GAP SERPL CALC-SCNC: 13 MMOL/L — SIGNIFICANT CHANGE UP (ref 5–17)
AST SERPL-CCNC: 56 U/L — HIGH (ref 15–37)
BILIRUB SERPL-MCNC: 0.4 MG/DL — SIGNIFICANT CHANGE UP (ref 0.2–1.2)
BUN SERPL-MCNC: 7 MG/DL — SIGNIFICANT CHANGE UP (ref 7–23)
CALCIUM SERPL-MCNC: 9.2 MG/DL — SIGNIFICANT CHANGE UP (ref 8.5–10.1)
CHLORIDE SERPL-SCNC: 109 MMOL/L — HIGH (ref 96–108)
CO2 SERPL-SCNC: 17 MMOL/L — LOW (ref 22–31)
CREAT SERPL-MCNC: 0.55 MG/DL — SIGNIFICANT CHANGE UP (ref 0.5–1.3)
GLUCOSE BLDC GLUCOMTR-MCNC: 124 MG/DL — HIGH (ref 70–99)
GLUCOSE BLDC GLUCOMTR-MCNC: 206 MG/DL — HIGH (ref 70–99)
GLUCOSE BLDC GLUCOMTR-MCNC: 296 MG/DL — HIGH (ref 70–99)
GLUCOSE BLDC GLUCOMTR-MCNC: 299 MG/DL — HIGH (ref 70–99)
GLUCOSE SERPL-MCNC: 289 MG/DL — HIGH (ref 70–99)
HCT VFR BLD CALC: 37.3 % — SIGNIFICANT CHANGE UP (ref 34.5–45)
HGB BLD-MCNC: 12.7 G/DL — SIGNIFICANT CHANGE UP (ref 11.5–15.5)
MCHC RBC-ENTMCNC: 29.6 PG — SIGNIFICANT CHANGE UP (ref 27–34)
MCHC RBC-ENTMCNC: 34 GM/DL — SIGNIFICANT CHANGE UP (ref 32–36)
MCV RBC AUTO: 86.9 FL — SIGNIFICANT CHANGE UP (ref 80–100)
NRBC # BLD: 0 /100 WBCS — SIGNIFICANT CHANGE UP (ref 0–0)
PLATELET # BLD AUTO: 186 K/UL — SIGNIFICANT CHANGE UP (ref 150–400)
POTASSIUM SERPL-MCNC: 3.6 MMOL/L — SIGNIFICANT CHANGE UP (ref 3.5–5.3)
POTASSIUM SERPL-SCNC: 3.6 MMOL/L — SIGNIFICANT CHANGE UP (ref 3.5–5.3)
PROT SERPL-MCNC: 7.5 GM/DL — SIGNIFICANT CHANGE UP (ref 6–8.3)
RBC # BLD: 4.29 M/UL — SIGNIFICANT CHANGE UP (ref 3.8–5.2)
RBC # FLD: 13.2 % — SIGNIFICANT CHANGE UP (ref 10.3–14.5)
SODIUM SERPL-SCNC: 139 MMOL/L — SIGNIFICANT CHANGE UP (ref 135–145)
WBC # BLD: 3.66 K/UL — LOW (ref 3.8–10.5)
WBC # FLD AUTO: 3.66 K/UL — LOW (ref 3.8–10.5)

## 2018-09-09 RX ORDER — HALOPERIDOL DECANOATE 100 MG/ML
50 INJECTION INTRAMUSCULAR
Qty: 0 | Refills: 0 | Status: CANCELLED | OUTPATIENT
Start: 2018-09-13 | End: 2018-09-11

## 2018-09-09 RX ORDER — HALOPERIDOL DECANOATE 100 MG/ML
2 INJECTION INTRAMUSCULAR
Qty: 0 | Refills: 0 | Status: DISCONTINUED | OUTPATIENT
Start: 2018-09-09 | End: 2018-09-11

## 2018-09-09 RX ORDER — INSULIN GLARGINE 100 [IU]/ML
10 INJECTION, SOLUTION SUBCUTANEOUS EVERY MORNING
Qty: 0 | Refills: 0 | Status: DISCONTINUED | OUTPATIENT
Start: 2018-09-09 | End: 2018-09-11

## 2018-09-09 RX ADMIN — HEPARIN SODIUM 5000 UNIT(S): 5000 INJECTION INTRAVENOUS; SUBCUTANEOUS at 05:28

## 2018-09-09 RX ADMIN — Medication 2 MILLIGRAM(S): at 09:50

## 2018-09-09 RX ADMIN — Medication 4: at 13:42

## 2018-09-09 RX ADMIN — Medication 6: at 08:25

## 2018-09-09 RX ADMIN — LISINOPRIL 5 MILLIGRAM(S): 2.5 TABLET ORAL at 05:29

## 2018-09-09 RX ADMIN — DIVALPROEX SODIUM 750 MILLIGRAM(S): 500 TABLET, DELAYED RELEASE ORAL at 13:41

## 2018-09-09 NOTE — PROGRESS NOTE ADULT - PROBLEM SELECTOR PLAN 1
- Secondary to medication noncompliance for Diabetes  - Elevated BG gradually trending down with current range of 124-299 as of 9/9  - UA positive for large ketones and glucose  - Hgb A1c = 13.9% (9/8/18)  - Current Lantus dosing as following: 10units QAM, 15units QPM  -Continue ISS  - POCT glucose checks   - Consistent Carb diet  - Clarify Levemir dose with PCP  - Continue to monitor

## 2018-09-09 NOTE — PROGRESS NOTE ADULT - SUBJECTIVE AND OBJECTIVE BOX
Pt has been seen and examined with FP resident, resident supervised agree with a/p       Patient is a 52y old  Female who presents with a chief complaint of Psychosis and hyperglycemia (08 Sep 2018 16:53)      PHYSICAL EXAM:  Vital Signs Last 24 Hrs  T(C): 36.4 (09 Sep 2018 04:46), Max: 36.6 (08 Sep 2018 20:38)  T(F): 97.5 (09 Sep 2018 04:46), Max: 97.9 (08 Sep 2018 20:38)  HR: 94 (09 Sep 2018 04:46) (80 - 94)  BP: 155/95 (09 Sep 2018 04:46) (118/60 - 155/95)  BP(mean): --  RR: 18 (09 Sep 2018 04:46) (18 - 18)  SpO2: 99% (09 Sep 2018 04:46) (99% - 100%)  general- comfortable   -rs-aeeb,cta  -cvs-s1s2 normal   -p/a-soft,bs+  -extremity- no asymmetrical swelling noted   -cns- non focal         A/P    #increase lantus, ct iss     #discharge plan- probably in next 24 hour to 5 N if blood sugar gets under control

## 2018-09-09 NOTE — CONSULT NOTE ADULT - SUBJECTIVE AND OBJECTIVE BOX
53 y/o F w/Schizoaffective disorder bipolar type, DM2, Stage 4 colon cancer with new mets to the liver s/p chemo 5yrs ago and radiation, corneal ulcer was brought to the ED by law enforcement with the complaints of erratic behaviour, hostility and aggressiveness towards . As per pt she checked her blood glucose and was 345 at home and is the only reason she is here.   As per , pt stopped taking all her medications and stopped following up with oncologist and endocrinologist because she believed she has been cured and has no medical issues. No records are available.     PAST MEDICAL & SURGICAL HISTORY:  Corneal ulcer  Diabetes mellitus  Schizoaffective disorder, bipolar type  Status post chemotherapy: h/o chemoport insertion at left sided abdomen underneath skin  S/P radiotherapy    FAMILY HISTORY:  Family history of autism (Child)  Family history of Down syndrome (Child)  Family history of heart disease (Father)  Family history of colon cancer in mother (Mother): Diagnosed at age 63, diead at age 65  Family history of schizophrenia (Mother)    REVIEW OF SYSTEMS:  CONSTITUTIONAL: No weakness, fevers or chills  EYES/ENT: No visual changes;  No vertigo or throat pain   NECK: No pain or stiffness  RESPIRATORY: No cough, wheezing, hemoptysis; No shortness of breath  CARDIOVASCULAR: No chest pain or palpitations  GASTROINTESTINAL: No abdominal or epigastric pain. No nausea, vomiting, or hematemesis; No diarrhea or constipation. No melena or hematochezia.  GENITOURINARY: No dysuria, frequency or hematuria  NEUROLOGICAL: No numbness or weakness  SKIN: No itching, burning, rashes, or lesions   All other review of systems is negative unless indicated above        Other Review of Systems: All other review of systems negative, except as noted in HPI       ICU Vital Signs Last 24 Hrs  T(C): 36.4 (09 Sep 2018 17:03), Max: 36.7 (09 Sep 2018 13:42)  T(F): 97.6 (09 Sep 2018 17:03), Max: 98 (09 Sep 2018 13:42)  HR: 114 (09 Sep 2018 17:03) (80 - 114)  BP: 131/72 (09 Sep 2018 17:03) (108/63 - 155/95)  BP(mean): --  ABP: --  ABP(mean): --  RR: 17 (09 Sep 2018 17:03) (17 - 18)  SpO2: 99% (09 Sep 2018 17:03) (98% - 100%)      PHYSICAL EXAM:  Constitutional: middle aged woman in NAD,  awake and alert, well-developed  HEENT: PERR, EOMI, Normal Hearing, MMM  Neck: Soft and supple, No LAD, No JVD  Respiratory: Breath sounds are clear bilaterally, No wheezing, rales or rhonchi  Cardiovascular: S1 and S2, regular rate and rhythm, no Murmurs, gallops or rubs  Gastrointestinal: Bowel Sounds present, soft, nontender, nondistended, no guarding, no rebound  Extremities: No peripheral edema  Vascular: 2+ peripheral pulses  Neurological: A/O x 3, no focal deficits  Musculoskeletal: 5/5 strength b/l upper and lower extremities  Skin: Chemoport on left abdomen      Comprehensive Metabolic Panel in AM (09.09.18 @ 05:24)    Sodium, Serum: 139 mmol/L    Potassium, Serum: 3.6 mmol/L    Chloride, Serum: 109 mmol/L    Carbon Dioxide, Serum: 17 mmol/L    Anion Gap, Serum: 13 mmol/L    Blood Urea Nitrogen, Serum: 7 mg/dL    Creatinine, Serum: 0.55 mg/dL    Glucose, Serum: 289 mg/dL    Calcium, Total Serum: 9.2 mg/dL    Protein Total, Serum: 7.5 gm/dL    Albumin, Serum: 3.0 g/dL    Bilirubin Total, Serum: 0.4 mg/dL    Alkaline Phosphatase, Serum: 349 U/L    Aspartate Aminotransferase (AST/SGOT): 56 U/L    Alanine Aminotransferase (ALT/SGPT): 96 U/L    eGFR if Non : 106: Interpretative comment  The units for eGFR are ml/min/1.73m2 (normalized body surface area). The  eGFR is calculated from a serum creatinine using the CKD-EPI equation.  Other variables required for calculation are race, age and sex. Among  patients with chronic kidney disease (CKD), the eGFR is useful in  determining the stage of disease according to KDOQI CKD classification.  All eGFR results are reported numerically with the following  interpretation.          GFR                    With                 Without     (ml/min/1.73 m2)    Kidney Damage       Kidney Damage        >= 90                    Stage 1                     Normal        60-89                    Stage 2                     Decreased GFR        30-59     Stage 3                     Stage 3        15-29                    Stage 4                     Stage 4        < 15                      Stage 5                     Stage 5  Each stage of CKD assumes that the associated GFR level has been in  effect for at least 3 months. Determination of stages one and two (with  eGFR > 59 ml/min/m2) requires estimation of kidney damage for at least 3  months as defined by structural or functional abnormalities.  Limitations: All estimates of GFR will be less accurate for patients at  extremes of muscle mass (including but not limited to frail elderly,  critically ill, or cancer patients), those with unusual diets, and those  with conditions associated with reduced secretion or extrarenal  elimination of creatinine. The eGFR equation is not recommended for use  in patients with unstable creatinine levels. mL/min/1.73M2    eGFR if African American: 123 mL/min/1.73M2      Complete Blood Count Repeat Every 24 Hours X 3 Days (09.09.18 @ 05:24)    Nucleated RBC: 0 /100 WBCs    WBC Count: 3.66 K/uL    RBC Count: 4.29 M/uL    Hemoglobin: 12.7 g/dL    Hematocrit: 37.3 %    Mean Cell Volume: 86.9 fl    Mean Cell Hemoglobin: 29.6 pg    Mean Cell Hemoglobin Conc: 34.0 gm/dL    Red Cell Distrib Width: 13.2 %    Platelet Count - Automated: 186 K/uL 51 y/o F w/Schizoaffective disorder bipolar type, DM2, Stage 4 colon cancer with new mets to the liver s/p chemo 5yrs ago and radiation, corneal ulcer was brought to the ED by law enforcement with the complaints of erratic behaviour, hostility and aggressiveness towards . As per pt she checked her blood glucose and was 345 at home and is the only reason she is here.   As per , pt stopped taking all her medications and stopped following up with oncologist and endocrinologist because she believed she has been cured and has no medical issues. No records are available.     Patient refused to speak to me.     PAST MEDICAL & SURGICAL HISTORY:  Corneal ulcer  Diabetes mellitus  Schizoaffective disorder, bipolar type  Status post chemotherapy: h/o chemoport insertion at left sided abdomen underneath skin  S/P radiotherapy    FAMILY HISTORY:  Family history of autism (Child)  Family history of Down syndrome (Child)  Family history of heart disease (Father)  Family history of colon cancer in mother (Mother): Diagnosed at age 63, diead at age 65  Family history of schizophrenia (Mother)    REVIEW OF SYSTEMS:  CONSTITUTIONAL: No weakness, fevers or chills  EYES/ENT: No visual changes;  No vertigo or throat pain   NECK: No pain or stiffness  RESPIRATORY: No cough, wheezing, hemoptysis; No shortness of breath  CARDIOVASCULAR: No chest pain or palpitations  GASTROINTESTINAL: No abdominal or epigastric pain. No nausea, vomiting, or hematemesis; No diarrhea or constipation. No melena or hematochezia.  GENITOURINARY: No dysuria, frequency or hematuria  NEUROLOGICAL: No numbness or weakness  SKIN: No itching, burning, rashes, or lesions   All other review of systems is negative unless indicated above        Other Review of Systems: All other review of systems negative, except as noted in HPI       ICU Vital Signs Last 24 Hrs  T(C): 36.4 (09 Sep 2018 17:03), Max: 36.7 (09 Sep 2018 13:42)  T(F): 97.6 (09 Sep 2018 17:03), Max: 98 (09 Sep 2018 13:42)  HR: 114 (09 Sep 2018 17:03) (80 - 114)  BP: 131/72 (09 Sep 2018 17:03) (108/63 - 155/95)  BP(mean): --  ABP: --  ABP(mean): --  RR: 17 (09 Sep 2018 17:03) (17 - 18)  SpO2: 99% (09 Sep 2018 17:03) (98% - 100%)      Patient refused physical exam       Comprehensive Metabolic Panel in AM (09.09.18 @ 05:24)    Sodium, Serum: 139 mmol/L    Potassium, Serum: 3.6 mmol/L    Chloride, Serum: 109 mmol/L    Carbon Dioxide, Serum: 17 mmol/L    Anion Gap, Serum: 13 mmol/L    Blood Urea Nitrogen, Serum: 7 mg/dL    Creatinine, Serum: 0.55 mg/dL    Glucose, Serum: 289 mg/dL    Calcium, Total Serum: 9.2 mg/dL    Protein Total, Serum: 7.5 gm/dL    Albumin, Serum: 3.0 g/dL    Bilirubin Total, Serum: 0.4 mg/dL    Alkaline Phosphatase, Serum: 349 U/L    Aspartate Aminotransferase (AST/SGOT): 56 U/L    Alanine Aminotransferase (ALT/SGPT): 96 U/L    eGFR if Non : 106: Interpretative comment  The units for eGFR are ml/min/1.73m2 (normalized body surface area). The  eGFR is calculated from a serum creatinine using the CKD-EPI equation.  Other variables required for calculation are race, age and sex. Among  patients with chronic kidney disease (CKD), the eGFR is useful in  determining the stage of disease according to KDOQI CKD classification.  All eGFR results are reported numerically with the following  interpretation.          GFR                    With                 Without     (ml/min/1.73 m2)    Kidney Damage       Kidney Damage        >= 90                    Stage 1                     Normal        60-89                    Stage 2                     Decreased GFR        30-59     Stage 3                     Stage 3        15-29                    Stage 4                     Stage 4        < 15                      Stage 5                     Stage 5  Each stage of CKD assumes that the associated GFR level has been in  effect for at least 3 months. Determination of stages one and two (with  eGFR > 59 ml/min/m2) requires estimation of kidney damage for at least 3  months as defined by structural or functional abnormalities.  Limitations: All estimates of GFR will be less accurate for patients at  extremes of muscle mass (including but not limited to frail elderly,  critically ill, or cancer patients), those with unusual diets, and those  with conditions associated with reduced secretion or extrarenal  elimination of creatinine. The eGFR equation is not recommended for use  in patients with unstable creatinine levels. mL/min/1.73M2    eGFR if African American: 123 mL/min/1.73M2      Complete Blood Count Repeat Every 24 Hours X 3 Days (09.09.18 @ 05:24)    Nucleated RBC: 0 /100 WBCs    WBC Count: 3.66 K/uL    RBC Count: 4.29 M/uL    Hemoglobin: 12.7 g/dL    Hematocrit: 37.3 %    Mean Cell Volume: 86.9 fl    Mean Cell Hemoglobin: 29.6 pg    Mean Cell Hemoglobin Conc: 34.0 gm/dL    Red Cell Distrib Width: 13.2 %    Platelet Count - Automated: 186 K/uL

## 2018-09-09 NOTE — PROVIDER CONTACT NOTE (CHANGE IN STATUS NOTIFICATION) - SITUATION
Patient has aggressive behavior towards personal and tried to entering patients room.   Patient screams loudly using aggressive words and gestures.

## 2018-09-09 NOTE — CONSULT NOTE ADULT - ASSESSMENT
53 y/o F w/Schizoaffective disorder bipolar type, DM2, Stage 4 colon cancer with new mets to the liver s/p chemo 5yrs ago and radiation. Patient is currently having an acute psychotic event, she needs to be evaluated by psych; needs to be stable before she can be treated for metastatic colon cancer.     PLease, request records from her oncologist.     Patient can be evaluated in the outpatient for systemic therapy, once the pathology  has been confirmed.    Thanks. 53 y/o F w/Schizoaffective disorder bipolar type, DM2, Stage 4 colon cancer with new mets to the liver s/p chemo 5yrs ago and radiation. Patient is currently having an acute psychotic event, she needs to be evaluated by psych; needs to be stable before she can be treated for metastatic colon cancer.  Patient refused to speak to me.     PLease, request records from her oncologist.     Patient can be evaluated in the outpatient for systemic therapy, once the pathology  has been confirmed.    Thanks.

## 2018-09-09 NOTE — CHART NOTE - NSCHARTNOTEFT_GEN_A_CORE
Consult follow up note ( 9/9/18)   Called by 1N to follow up with pt after code gray called due to the pt's manic psychotic related agitation that did not respond to verbal redirection and other efforts to calm the pt.    Pt is a 54 yr-old WF with schizoaffective d/o-bipolar type, DM II and Stage 4 colon cancer with newly discovered mets to the liver. Pt was admitted to Medicine on 9/7/18 to stabilize her DM and significantly elevated blood glucose.     Pt seen on 9/7/18 by Consult Psychiatry Dr Mcfarland who noted pt h/o treatment noncompliance with both all medical co morbid disorders including psychiatric d/o   Depakote level < 3. Pt had missed last Haldol decanoate 50 mg IM in 7/18 with last Haldol decanoate 50 mg IM received in 8/16/18.       On 9/9/18, a code gray was called on 1N due to the pt's sudden escalation of paranoid psychotic manic agitation with pt's reportedly screaming illogically about ' organs being harvested"        Pt received emergency IM Haldol 5 mg  and Ativan 2 mg IM as per Psychiatry.   Serum ammonia  NH3 = 19  and Head CT did not reveal any acute intracranial bleed or mass.      Pt observed by writer asleep in with CO 1: at the pt's bedside.  Decision made not to awaken the pt at this time in light of pt's recent severe manic psychotic agitation.   Plan  1. Standing Haldol 2 mg po bid added ( psychosis)  2.Haldol decanoate 50 mg IM  ordered to be given 9/13/18 as above.  3.Continue restarted Depakote 750 mg po q day ( SAD-bipolar type)  4.Consult Psychiatry to follow and writer to be contacted prn for further pt concerns  5.Will plan to transfer the pt to  for ongoing evaluation and treatment of her schizoaffective d/o-bipolar type once pt is medically cleared Consult follow up note ( 9/9/18)   Called by 1N to follow up with pt after code gray called due to the pt's manic psychotic related agitation that did not respond to verbal redirection and other efforts to calm the pt.    Pt is a 52 yr-old WF with schizoaffective d/o-bipolar type, DM II and Stage 4 colon cancer with newly discovered mets to the liver. Pt was admitted to Medicine on 9/7/18 to stabilize her DM and significantly elevated blood glucose.     Pt seen on 9/7/18 by Consult Psychiatry Dr Mcfarland who noted pt h/o treatment noncompliance with both all medical co morbid disorders including psychiatric d/o   Depakote level < 3. Pt had missed last Haldol decanoate 50 mg IM in 7/18 with last Haldol decanoate 50 mg IM received in 8/16/18.       On 9/9/18, a code gray was called on 1N due to the pt's sudden escalation of paranoid psychotic manic agitation with pt's reportedly screaming illogically about ' organs being harvested"        Pt received emergency IM Haldol 5 mg  and Ativan 2 mg IM as per Psychiatry.   Serum ammonia  NH3 = 19  and Head CT did not reveal any acute intracranial bleed or mass.      Pt observed by writer asleep in with CO 1: at the pt's bedside.  Decision made not to awaken the pt at this time in light of pt's recent severe manic psychotic agitation.   Plan  1. Standing Haldol 2 mg po bid added ( psychosis)  2.Haldol decanoate 50 mg IM  ordered to be given 9/13/18 as above.  3.Continue restarted Depakote 750 mg po q day ( SAD-bipolar type)  4.Consult Psychiatry to follow and writer to be contacted prn for further pt concerns  5.Will plan to transfer the pt to  for ongoing evaluation and treatment of her schizoaffective d/o-bipolar type once pt is medically cleared Blood glucose still quite elevated  = 299 on 9/9/18 AM. and in the mid 300's 9/8/18.

## 2018-09-09 NOTE — PROGRESS NOTE ADULT - SUBJECTIVE AND OBJECTIVE BOX
HPI:  51 y/o F w/Schizoaffective disorder bipolar type, DM2, Stage 4 colon cancer with new mets to the liver s/p chemo 5yrs ago and radiation, corneal ulcer was brought to the ED by law enforcement with the complaints of erratic behaviour, hostility and aggressiveness towards . As per pt she checked her blood glucose and was 345 at home and is the only reason she is here.   As per , pt stopped taking all her medications and stopped following up with oncologist and endocrinologist because she believed she has been cured and has no medical issues. Today, pt locked  out and was aggressive to herself and family and wouldn't let anyone near. Pt agrees to been cured of all medical conditions and endorses auditory hallucinations which she describes as jessi voices telling her stories. Pt have inappropriate laughters which she relates to the stories her dead family are telling her as I was in the room. Pt denies headaches, blurry vision or vision changes, CP, SOB, palpitations, abdominal pain, fever, chills, N/V, diarrhea or constipation dysuria, urgency. Pt reports increase urinary frequency which has been ongoing for years.    In the ED, pt is s/p 3L NS bolus, Depakote, humalog.   Labs, CT scan and CXR were obtained. (07 Sep 2018 23:28)      SUBJECTIVE:   9/9/2018:   Pt this AM was with increased aggression for which she was given Ativan. Pt appeared calm, sleeping in bed by the time I saw her. Later during the morning, nurse called to report that pt had fled out of her room and running out of the unit screaming that the hospital is harvesting organs. Pt was escorted back to her room by security, has ongoing 1:1 observation, and was also evaluated by Psychiatry with plan to increase Haldol dose frequency.    REVIEW OF SYSTEMS:  CONSTITUTIONAL: No fevers or chills  EYES/ENT: no visual changes  NECK: No pain or stiffness  RESPIRATORY: No cough, no shortness of breath  CARDIOVASCULAR: No chest pain or palpitations  GASTROINTESTINAL: No abdominal or epigastric pain. No nausea, vomiting, or hematemesis; No diarrhea or constipation. No melena or hematochezia.  GENITOURINARY: No dysuria, frequency or hematuria  NEUROLOGICAL: No numbness or weakness  Psychiatric: ongoing visual and auditory hallucination  SKIN: No itching, burning, rashes, or lesions   All other review of systems is negative unless indicated above    Vital Signs Last 24 Hrs  T(C): 36.4 (09 Sep 2018 17:03), Max: 36.7 (09 Sep 2018 13:42)  T(F): 97.6 (09 Sep 2018 17:03), Max: 98 (09 Sep 2018 13:42)  HR: 114 (09 Sep 2018 17:03) (80 - 114)  BP: 131/72 (09 Sep 2018 17:03) (108/63 - 155/95)  RR: 17 (09 Sep 2018 17:03) (17 - 18)  SpO2: 99% (09 Sep 2018 17:03) (98% - 100%)    I&O's Summary      CAPILLARY BLOOD GLUCOSE      POCT Blood Glucose.: 124 mg/dL (09 Sep 2018 16:57)  POCT Blood Glucose.: 206 mg/dL (09 Sep 2018 13:40)  POCT Blood Glucose.: 299 mg/dL (09 Sep 2018 07:55)  POCT Blood Glucose.: 394 mg/dL (08 Sep 2018 21:30)      PHYSICAL EXAM:  Constitutional: NAD, awake and alert, lying in bed and calm at the time seen  HEENT: EOMI, Normal Hearing, MMM  Neck: Soft, No LAD, No JVD  Respiratory: Breath sounds are clear bilaterally, no wheezing  Cardiovascular: +S1 and S2, regular rate and rhythm, no murmurs, gallops or rubs  Gastrointestinal: Bowel Sounds present, soft, nontender, nondistended, no guarding, no rebound  Extremities: No peripheral edema  Vascular: 2+ peripheral pulses  Neurological: A/O x 3, no focal deficits  Musculoskeletal: 5/5 strength b/l upper and lower extremities  Skin: left abdomen with a chemoport, no rashes    MEDICATIONS:  MEDICATIONS  (STANDING):  dextrose 5%. 1000 milliLiter(s) (50 mL/Hr) IV Continuous <Continuous>  dextrose 50% Injectable 12.5 Gram(s) IV Push once  dextrose 50% Injectable 25 Gram(s) IV Push once  dextrose 50% Injectable 25 Gram(s) IV Push once  diVALproex  milliGRAM(s) Oral daily  haloperidol     Tablet 2 milliGRAM(s) Oral two times a day  heparin  Injectable 5000 Unit(s) SubCutaneous every 12 hours  insulin glargine Injectable (LANTUS) 15 Unit(s) SubCutaneous at bedtime  insulin glargine Injectable (LANTUS) 10 Unit(s) SubCutaneous every morning  insulin lispro (HumaLOG) corrective regimen sliding scale   SubCutaneous three times a day before meals  insulin lispro (HumaLOG) corrective regimen sliding scale   SubCutaneous at bedtime  lisinopril 5 milliGRAM(s) Oral daily  sodium chloride 0.9%. 1000 milliLiter(s) (100 mL/Hr) IV Continuous <Continuous>      LABS: All Labs Reviewed:                        12.7   3.66  )-----------( 186      ( 09 Sep 2018 05:24 )             37.3     09-09    139  |  109<H>  |  7   ----------------------------<  289<H>  3.6   |  17<L>  |  0.55    Ca    9.2      09 Sep 2018 05:24    TPro  7.5  /  Alb  3.0<L>  /  TBili  0.4  /  DBili  x   /  AST  56<H>  /  ALT  96<H>  /  AlkPhos  349<H>  09-09 HPI:  53 y/o F w/Schizoaffective disorder bipolar type, DM2, Stage 4 colon cancer with new mets to the liver s/p chemo 5yrs ago and radiation, corneal ulcer was brought to the ED by law enforcement with the complaints of erratic behaviour, hostility and aggressiveness towards . As per pt she checked her blood glucose and was 345 at home and is the only reason she is here.   As per , pt stopped taking all her medications and stopped following up with oncologist and endocrinologist because she believed she has been cured and has no medical issues. Today, pt locked  out and was aggressive to herself and family and wouldn't let anyone near. Pt agrees to been cured of all medical conditions and endorses auditory hallucinations which she describes as jessi voices telling her stories. Pt have inappropriate laughters which she relates to the stories her dead family are telling her as I was in the room. Pt denies headaches, blurry vision or vision changes, CP, SOB, palpitations, abdominal pain, fever, chills, N/V, diarrhea or constipation dysuria, urgency. Pt reports increase urinary frequency which has been ongoing for years.    In the ED, pt is s/p 3L NS bolus, Depakote, humalog.   Labs, CT scan and CXR were obtained. (07 Sep 2018 23:28)      SUBJECTIVE:   9/9/2018:   Pt this AM was with increased aggression for which she was given Ativan. Pt appeared calm, sleeping in bed by the time I saw her. Later during the morning, nurse called to report that pt had fled out of her room and running out of the unit screaming that the hospital is harvesting organs. Pt was escorted back to her room by security, has ongoing 1:1 observation, and was also evaluated by Psychiatry with plan to increase Haldol dose frequency.    REVIEW OF SYSTEMS:  CONSTITUTIONAL: No fevers or chills  EYES/ENT: no visual changes  NECK: No pain or stiffness  RESPIRATORY: No cough, no shortness of breath  CARDIOVASCULAR: No chest pain or palpitations  GASTROINTESTINAL: No abdominal or epigastric pain. No nausea, vomiting, or hematemesis; No diarrhea or constipation. No melena or hematochezia  GENITOURINARY: No dysuria, frequency or hematuria  NEUROLOGICAL: No numbness or weakness  Psychiatric: ongoing visual and auditory hallucination  SKIN: No itching, burning, rashes, or lesions   All other review of systems is negative unless indicated above    Vital Signs Last 24 Hrs  T(C): 36.4 (09 Sep 2018 17:03), Max: 36.7 (09 Sep 2018 13:42)  T(F): 97.6 (09 Sep 2018 17:03), Max: 98 (09 Sep 2018 13:42)  HR: 114 (09 Sep 2018 17:03) (80 - 114)  BP: 131/72 (09 Sep 2018 17:03) (108/63 - 155/95)  RR: 17 (09 Sep 2018 17:03) (17 - 18)  SpO2: 99% (09 Sep 2018 17:03) (98% - 100%)    I&O's Summary      CAPILLARY BLOOD GLUCOSE      POCT Blood Glucose.: 124 mg/dL (09 Sep 2018 16:57)  POCT Blood Glucose.: 206 mg/dL (09 Sep 2018 13:40)  POCT Blood Glucose.: 299 mg/dL (09 Sep 2018 07:55)  POCT Blood Glucose.: 394 mg/dL (08 Sep 2018 21:30)      PHYSICAL EXAM:  Constitutional: NAD, awake and alert, lying in bed and calm at the time seen  HEENT: EOMI, Normal Hearing, MMM  Neck: Soft, No LAD, No JVD  Respiratory: Breath sounds are clear bilaterally, no wheezing  Cardiovascular: +S1 and S2, regular rate and rhythm, no murmurs, gallops or rubs  Gastrointestinal: Bowel Sounds present, soft, nontender, nondistended, no guarding, no rebound  Extremities: No peripheral edema  Vascular: 2+ peripheral pulses  Neurological: A/O x 3, no focal deficits  Musculoskeletal: 5/5 strength b/l upper and lower extremities  Skin: left abdomen with a chemoport, no rashes    MEDICATIONS:  MEDICATIONS  (STANDING):  dextrose 5%. 1000 milliLiter(s) (50 mL/Hr) IV Continuous <Continuous>  dextrose 50% Injectable 12.5 Gram(s) IV Push once  dextrose 50% Injectable 25 Gram(s) IV Push once  dextrose 50% Injectable 25 Gram(s) IV Push once  diVALproex  milliGRAM(s) Oral daily  haloperidol     Tablet 2 milliGRAM(s) Oral two times a day  heparin  Injectable 5000 Unit(s) SubCutaneous every 12 hours  insulin glargine Injectable (LANTUS) 15 Unit(s) SubCutaneous at bedtime  insulin glargine Injectable (LANTUS) 10 Unit(s) SubCutaneous every morning  insulin lispro (HumaLOG) corrective regimen sliding scale   SubCutaneous three times a day before meals  insulin lispro (HumaLOG) corrective regimen sliding scale   SubCutaneous at bedtime  lisinopril 5 milliGRAM(s) Oral daily  sodium chloride 0.9%. 1000 milliLiter(s) (100 mL/Hr) IV Continuous <Continuous>      LABS: All Labs Reviewed:                        12.7   3.66  )-----------( 186      ( 09 Sep 2018 05:24 )             37.3     09-09    139  |  109<H>  |  7   ----------------------------<  289<H>  3.6   |  17<L>  |  0.55    Ca    9.2      09 Sep 2018 05:24    TPro  7.5  /  Alb  3.0<L>  /  TBili  0.4  /  DBili  x   /  AST  56<H>  /  ALT  96<H>  /  AlkPhos  349<H>  09-09

## 2018-09-09 NOTE — PROVIDER CONTACT NOTE (CHANGE IN STATUS NOTIFICATION) - BACKGROUND
pt has a history of schizoaffective disorder, bipolar type. Pt was being aggressive towards  and was taken in by police.

## 2018-09-09 NOTE — PROGRESS NOTE ADULT - PROBLEM SELECTOR PLAN 2
- Uncontrolled ( with pt in active psychosis) 2/2 medication non compliance  - Valproic acid level <3  - Utox negative  - CT head was negative  - Ammonia levels 19 (9/8/18)  - Psych consult appreciated:  - Standing Haldol 2mg BID  - Continue Haldol 50mg injection monthly (next dose to be administered on 9/13/18; ast naranjo)  - Continue Depakoate 750mg daily  - Continue Haldol 5, Ativan 2 and benadryl PRN as per psych.   - Continue constant observation with 1:1  - Continue to monitor  - Plan to transfer to 5N following stability of BG

## 2018-09-09 NOTE — PROVIDER CONTACT NOTE (CHANGE IN STATUS NOTIFICATION) - ACTION/TREATMENT ORDERED:
Patient refused to take Haldol or Ativan for anxiety. Patient resting in the bed now. Will continue to monitor pt.
Ativan 1mg IM given stat, Haldol is on board if needed. Psych called to assess pt and medical residents are aware of Pt's increased agitation. Awaiting [facility] follow up evaluation

## 2018-09-09 NOTE — PROGRESS NOTE ADULT - PROBLEM SELECTOR PLAN 3
- Has stage 4 Colon Cancer with new liver mets (follows up as [facility] but believes she is cured and does not follow up anymore)  - F/u oncology consult

## 2018-09-10 VITALS
OXYGEN SATURATION: 100 % | RESPIRATION RATE: 18 BRPM | HEART RATE: 94 BPM | SYSTOLIC BLOOD PRESSURE: 136 MMHG | TEMPERATURE: 99 F | DIASTOLIC BLOOD PRESSURE: 85 MMHG

## 2018-09-10 PROCEDURE — 99223 1ST HOSP IP/OBS HIGH 75: CPT

## 2018-09-10 RX ORDER — INSULIN LISPRO 100/ML
3 VIAL (ML) SUBCUTANEOUS
Qty: 0 | Refills: 0 | Status: DISCONTINUED | OUTPATIENT
Start: 2018-09-10 | End: 2018-09-11

## 2018-09-10 RX ADMIN — HALOPERIDOL DECANOATE 5 MILLIGRAM(S): 100 INJECTION INTRAMUSCULAR at 20:13

## 2018-09-10 NOTE — PROGRESS NOTE ADULT - ATTENDING COMMENTS
on exam- comfortable   -[id number], cta    A/P    #start lantus, ct iss     #check her labs again and follow up on bicarbonate if it is still low and showed worsening then check ketone bodies, iv fluids and close monitoring
Patient seen and examined with Family Medicine Residents Vic Gauthier, Jaquelin Weaver, and Yadira Lam on the Family Medicine Teaching Service.  Agree with history, physical, labs and plan which were reviewed in detail after a face to face encounter with the patient.

## 2018-09-10 NOTE — PROVIDER CONTACT NOTE (CHANGE IN STATUS NOTIFICATION) - RECOMMENDATIONS
Lukasz Kwong called; pt received PRN dose of Haldol IM for agitation; security present for safety; supervision aware; no further instructions/orders received from medical team; will continue to monitor

## 2018-09-10 NOTE — PROVIDER CONTACT NOTE (CHANGE IN STATUS NOTIFICATION) - ASSESSMENT
Patient remained on constant observation 1: 1 for safety. Patient A&OX4. Patient appropriately answers questions.
Pt is very agitated and screaming not to touch her. Pt is saying that she is an .
Pt uncooperative, agitated, experiencing auditory hallucinations and speech is illogical

## 2018-09-10 NOTE — PROGRESS NOTE ADULT - PROBLEM SELECTOR PLAN 3
- Has stage 4 Colon Cancer with new liver mets (follows up as [facility] but believes she is cured and does not follow up anymore)  - Pt seen by Heme/Onc (Dr. Schneider)- pt however refused to speak to Physician, per chart note

## 2018-09-10 NOTE — PROVIDER CONTACT NOTE (CHANGE IN STATUS NOTIFICATION) - SITUATION
Pt becoming increasingly agitated; refusing to stay in room; potential flight risk; hostility towards other patients/visitors; code gray called overhead

## 2018-09-10 NOTE — PROGRESS NOTE ADULT - PROBLEM SELECTOR PLAN 1
- Secondary to medication noncompliance for Diabetes -pt is currently refusing all medications and BG checks  -Last BG check on 9/9 of 296  - Hgb A1c = 13.9% (9/8/18)  - Current Lantus dosing as following: 10units QAM, 15units QPM  -Continue ISS  - POCT glucose checks   - Consistent Carb diet  - Levemir dose to be clarified with PCP/Endocrinologist (Dr. Baltazar Almendarez)  - Continue to monitor and encourage medication compliance - Secondary to medication noncompliance for Diabetes -pt is currently refusing all medications and BG checks  -Last BG check on 9/9 of 296  - Hgb A1c = 13.9% (9/8/18)  - Current Lantus dosing as following: 10units QAM, 15units QPM  -Continue ISS  -Nutrition correction insulin added  - POCT glucose checks   - Consistent Carb diet  - Levemir dose to be clarified with PCP/Endocrinologist (Dr. Baltazar Almendarez)  - Continue to monitor and encourage medication compliance

## 2018-09-10 NOTE — PROGRESS NOTE ADULT - SUBJECTIVE AND OBJECTIVE BOX
HPI:  51 y/o F w/Schizoaffective disorder bipolar type, DM2, Stage 4 colon cancer with new mets to the liver s/p chemo 5yrs ago and radiation, corneal ulcer was brought to the ED by law enforcement with the complaints of erratic behaviour, hostility and aggressiveness towards . As per pt she checked her blood glucose and was 345 at home and is the only reason she is here.   As per , pt stopped taking all her medications and stopped following up with oncologist and endocrinologist because she believed she has been cured and has no medical issues. Today, pt locked  out and was aggressive to herself and family and wouldn't let anyone near. Pt agrees to been cured of all medical conditions and endorses auditory hallucinations which she describes as jessi voices telling her stories. Pt have inappropriate laughters which she relates to the stories her dead family are telling her as I was in the room. Pt denies headaches, blurry vision or vision changes, CP, SOB, palpitations, abdominal pain, fever, chills, N/V, diarrhea or constipation dysuria, urgency. Pt reports increase urinary frequency which has been ongoing for years.    In the ED, pt is s/p 3L NS bolus, Depakote, humalog.   Labs, CT scan and CXR were obtained. (07 Sep 2018 23:28)      SUBJECTIVE:     9/10/2018:  Pt states that she is doing well today. Pt is however refusing medications, vital sign check, and blood glucose check as she reports that she is fine. Per Psych note, pt was seen by Psych team but refused to be interviewed. Pt is also refusing physical examination this morning.    Dr. Gauthier ([id number]) was able to have a conversation with pt's sister who was visiting today. The sister provided the following physician contacts that pt has seen in the past for her curren medical conditions:  - Zarina Guerrero - PCP  - Dr. Baltazar Almendarez - Endocrinologist  - Dr. Rainey - Radiation Oncologist    REVIEW OF SYSTEMS:  CONSTITUTIONAL: No weakness, fevers or chills  EYES/ENT: No visual changes  NECK: No pain or stiffness  RESPIRATORY: No cough, no shortness of breath  CARDIOVASCULAR: No chest pain or palpitations  GASTROINTESTINAL: No abdominal or epigastric pain. No nausea, vomiting, or hematemesis; No diarrhea or constipation  GENITOURINARY: No dysuria, frequency or hematuria  NEUROLOGICAL: No numbness or weakness  SKIN: No itching, burning, rashes, or lesions   All other review of systems is negative unless indicated above    Vital Signs Last 24 Hrs  T(C): 37.1 (10 Sep 2018 12:29), Max: 37.1 (10 Sep 2018 12:29)  T(F): 98.8 (10 Sep 2018 12:29), Max: 98.8 (10 Sep 2018 12:29)  HR: 94 (10 Sep 2018 12:29) (94 - 102)  BP: 136/85 (10 Sep 2018 12:29) (131/92 - 136/85)  RR: 18 (10 Sep 2018 12:29) (18 - 18)  SpO2: 100% (10 Sep 2018 12:29) (99% - 100%)      CAPILLARY BLOOD GLUCOSE    POCT Blood Glucose.: 296 mg/dL (09 Sep 2018 21:19)      PHYSICAL EXAM:  Constitutional: NAD, awake and alert, sitting in patient lounge watching TV; pt refuses to be thoroughly examined, stating that she is good and that physical exam is not necessary  HEENT: PERR, EOMI, Normal Hearing, MMM  Neurological: A/O x 3, no focal deficits  Musculoskeletal: appropriately moves all extremities  Skin: No rashes    MEDICATIONS:  MEDICATIONS  (STANDING):  dextrose 5%. 1000 milliLiter(s) (50 mL/Hr) IV Continuous <Continuous>  dextrose 50% Injectable 12.5 Gram(s) IV Push once  dextrose 50% Injectable 25 Gram(s) IV Push once  dextrose 50% Injectable 25 Gram(s) IV Push once  diVALproex  milliGRAM(s) Oral daily  haloperidol     Tablet 2 milliGRAM(s) Oral two times a day  heparin  Injectable 5000 Unit(s) SubCutaneous every 12 hours  insulin glargine Injectable (LANTUS) 15 Unit(s) SubCutaneous at bedtime  insulin glargine Injectable (LANTUS) 10 Unit(s) SubCutaneous every morning  insulin lispro (HumaLOG) corrective regimen sliding scale   SubCutaneous three times a day before meals  insulin lispro (HumaLOG) corrective regimen sliding scale   SubCutaneous at bedtime  insulin lispro Injectable (HumaLOG) 3 Unit(s) SubCutaneous before breakfast  insulin lispro Injectable (HumaLOG) 3 Unit(s) SubCutaneous before lunch  insulin lispro Injectable (HumaLOG) 3 Unit(s) SubCutaneous before dinner  lisinopril 5 milliGRAM(s) Oral daily  sodium chloride 0.9%. 1000 milliLiter(s) (100 mL/Hr) IV Continuous <Continuous>      LABS: All Labs Reviewed:                        12.7   3.66  )-----------( 186      ( 09 Sep 2018 05:24 )             37.3     09-09    139  |  109<H>  |  7   ----------------------------<  289<H>  3.6   |  17<L>  |  0.55    Ca    9.2      09 Sep 2018 05:24    TPro  7.5  /  Alb  3.0<L>  /  TBili  0.4  /  DBili  x   /  AST  56<H>  /  ALT  96<H>  /  AlkPhos  349<H>  09-09

## 2018-09-10 NOTE — PROGRESS NOTE ADULT - SUBJECTIVE AND OBJECTIVE BOX
Pt refusing to be interviewed She si comfortably watching TV.   Per RN, pt continues on be noncompliant and refused to  her Glucose check.  Psych will f/u. PT is due fr haldol decanoate  on 8/13/18. Pt refusing to be interviewed She si comfortably watching TV.   Per RN, pt continues on be noncompliant and refused to  her Glucose check.  Psych will f/u. PT is due fr haldol decanoate  on 9/13/18.

## 2018-09-10 NOTE — PROGRESS NOTE ADULT - REASON FOR ADMISSION
Psychosis and hyperglycemia

## 2018-09-11 ENCOUNTER — TRANSCRIPTION ENCOUNTER (OUTPATIENT)
Age: 53
End: 2018-09-11

## 2018-09-11 ENCOUNTER — INPATIENT (INPATIENT)
Facility: HOSPITAL | Age: 53
LOS: 27 days | Discharge: HOME | End: 2018-10-09
Attending: PSYCHIATRY & NEUROLOGY | Admitting: PSYCHIATRY & NEUROLOGY
Payer: MEDICARE

## 2018-09-11 VITALS — WEIGHT: 136.69 LBS | HEIGHT: 61.81 IN | TEMPERATURE: 99 F | OXYGEN SATURATION: 100 % | RESPIRATION RATE: 18 BRPM

## 2018-09-11 DIAGNOSIS — Z92.3 PERSONAL HISTORY OF IRRADIATION: Chronic | ICD-10-CM

## 2018-09-11 DIAGNOSIS — Z92.21 PERSONAL HISTORY OF ANTINEOPLASTIC CHEMOTHERAPY: Chronic | ICD-10-CM

## 2018-09-11 DIAGNOSIS — Z91.19 PATIENT'S NONCOMPLIANCE WITH OTHER MEDICAL TREATMENT AND REGIMEN: ICD-10-CM

## 2018-09-11 DIAGNOSIS — Z91.14 PATIENT'S OTHER NONCOMPLIANCE WITH MEDICATION REGIMEN: ICD-10-CM

## 2018-09-11 PROBLEM — F25.0 SCHIZOAFFECTIVE DISORDER, BIPOLAR TYPE: Chronic | Status: ACTIVE | Noted: 2018-09-07

## 2018-09-11 PROBLEM — H16.009: Chronic | Status: ACTIVE | Noted: 2018-09-07

## 2018-09-11 PROBLEM — E11.9 TYPE 2 DIABETES MELLITUS WITHOUT COMPLICATIONS: Chronic | Status: ACTIVE | Noted: 2018-09-07

## 2018-09-11 LAB — GLUCOSE BLDC GLUCOMTR-MCNC: 316 MG/DL — HIGH (ref 70–99)

## 2018-09-11 PROCEDURE — 99232 SBSQ HOSP IP/OBS MODERATE 35: CPT

## 2018-09-11 PROCEDURE — 12345: CPT | Mod: NC

## 2018-09-11 RX ORDER — DEXTROSE 50 % IN WATER 50 %
50 SYRINGE (ML) INTRAVENOUS
Qty: 0 | Refills: 0 | COMMUNITY
Start: 2018-09-11

## 2018-09-11 RX ORDER — HALOPERIDOL DECANOATE 100 MG/ML
5 INJECTION INTRAMUSCULAR EVERY 6 HOURS
Qty: 0 | Refills: 0 | Status: DISCONTINUED | OUTPATIENT
Start: 2018-09-11 | End: 2018-10-09

## 2018-09-11 RX ORDER — INSULIN GLARGINE 100 [IU]/ML
15 INJECTION, SOLUTION SUBCUTANEOUS AT BEDTIME
Qty: 0 | Refills: 0 | Status: DISCONTINUED | OUTPATIENT
Start: 2018-09-11 | End: 2018-09-13

## 2018-09-11 RX ORDER — DEXTROSE 50 % IN WATER 50 %
0 SYRINGE (ML) INTRAVENOUS
Qty: 0 | Refills: 0 | COMMUNITY
Start: 2018-09-11

## 2018-09-11 RX ORDER — HEPARIN SODIUM 5000 [USP'U]/ML
5000 INJECTION INTRAVENOUS; SUBCUTANEOUS
Qty: 0 | Refills: 0 | COMMUNITY
Start: 2018-09-11

## 2018-09-11 RX ORDER — INSULIN LISPRO 100/ML
VIAL (ML) SUBCUTANEOUS AT BEDTIME
Qty: 0 | Refills: 0 | Status: DISCONTINUED | OUTPATIENT
Start: 2018-09-11 | End: 2018-09-12

## 2018-09-11 RX ORDER — LISINOPRIL 2.5 MG/1
5 TABLET ORAL DAILY
Qty: 0 | Refills: 0 | Status: DISCONTINUED | OUTPATIENT
Start: 2018-09-11 | End: 2018-10-09

## 2018-09-11 RX ORDER — INSULIN LISPRO 100/ML
3 VIAL (ML) SUBCUTANEOUS
Qty: 0 | Refills: 0 | Status: DISCONTINUED | OUTPATIENT
Start: 2018-09-11 | End: 2018-09-16

## 2018-09-11 RX ORDER — DEXTROSE 50 % IN WATER 50 %
25 SYRINGE (ML) INTRAVENOUS
Qty: 0 | Refills: 0 | COMMUNITY
Start: 2018-09-11

## 2018-09-11 RX ORDER — DEXTROSE 50 % IN WATER 50 %
12.5 SYRINGE (ML) INTRAVENOUS ONCE
Qty: 0 | Refills: 0 | Status: DISCONTINUED | OUTPATIENT
Start: 2018-09-11 | End: 2018-10-09

## 2018-09-11 RX ORDER — INSULIN GLARGINE 100 [IU]/ML
10 INJECTION, SOLUTION SUBCUTANEOUS EVERY MORNING
Qty: 0 | Refills: 0 | Status: DISCONTINUED | OUTPATIENT
Start: 2018-09-11 | End: 2018-09-12

## 2018-09-11 RX ORDER — SODIUM CHLORIDE 9 MG/ML
1000 INJECTION, SOLUTION INTRAVENOUS
Qty: 0 | Refills: 0 | COMMUNITY
Start: 2018-09-11

## 2018-09-11 RX ORDER — INSULIN LISPRO 100/ML
3 VIAL (ML) SUBCUTANEOUS
Qty: 1 | Refills: 0 | OUTPATIENT
Start: 2018-09-11

## 2018-09-11 RX ORDER — DIPHENHYDRAMINE HCL 50 MG
50 CAPSULE ORAL EVERY 6 HOURS
Qty: 0 | Refills: 0 | Status: DISCONTINUED | OUTPATIENT
Start: 2018-09-11 | End: 2018-10-09

## 2018-09-11 RX ORDER — DIVALPROEX SODIUM 500 MG/1
500 TABLET, DELAYED RELEASE ORAL
Qty: 0 | Refills: 0 | Status: DISCONTINUED | OUTPATIENT
Start: 2018-09-11 | End: 2018-09-11

## 2018-09-11 RX ORDER — DEXTROSE 50 % IN WATER 50 %
25 SYRINGE (ML) INTRAVENOUS ONCE
Qty: 0 | Refills: 0 | Status: DISCONTINUED | OUTPATIENT
Start: 2018-09-11 | End: 2018-10-09

## 2018-09-11 RX ORDER — DIVALPROEX SODIUM 500 MG/1
500 TABLET, DELAYED RELEASE ORAL
Qty: 0 | Refills: 0 | Status: DISCONTINUED | OUTPATIENT
Start: 2018-09-11 | End: 2018-09-14

## 2018-09-11 RX ORDER — LIRAGLUTIDE 6 MG/ML
0 INJECTION SUBCUTANEOUS
Qty: 0 | Refills: 0 | COMMUNITY

## 2018-09-11 RX ORDER — GLUCAGON INJECTION, SOLUTION 0.5 MG/.1ML
1 INJECTION, SOLUTION SUBCUTANEOUS ONCE
Qty: 0 | Refills: 0 | Status: DISCONTINUED | OUTPATIENT
Start: 2018-09-11 | End: 2018-10-09

## 2018-09-11 RX ORDER — INSULIN GLARGINE 100 [IU]/ML
10 INJECTION, SOLUTION SUBCUTANEOUS
Qty: 0 | Refills: 0 | COMMUNITY
Start: 2018-09-11

## 2018-09-11 RX ORDER — HALOPERIDOL DECANOATE 100 MG/ML
2 INJECTION INTRAMUSCULAR
Qty: 0 | Refills: 0 | Status: DISCONTINUED | OUTPATIENT
Start: 2018-09-11 | End: 2018-09-13

## 2018-09-11 RX ORDER — SODIUM CHLORIDE 9 MG/ML
0 INJECTION INTRAMUSCULAR; INTRAVENOUS; SUBCUTANEOUS
Qty: 0 | Refills: 0 | COMMUNITY
Start: 2018-09-11

## 2018-09-11 RX ORDER — INSULIN GLARGINE 100 [IU]/ML
15 INJECTION, SOLUTION SUBCUTANEOUS
Qty: 0 | Refills: 0 | COMMUNITY
Start: 2018-09-11

## 2018-09-11 RX ORDER — DEXTROSE 50 % IN WATER 50 %
15 SYRINGE (ML) INTRAVENOUS ONCE
Qty: 0 | Refills: 0 | Status: DISCONTINUED | OUTPATIENT
Start: 2018-09-11 | End: 2018-10-09

## 2018-09-11 RX ORDER — INSULIN DETEMIR 100/ML (3)
60 INSULIN PEN (ML) SUBCUTANEOUS
Qty: 0 | Refills: 0 | COMMUNITY

## 2018-09-11 RX ORDER — HEPARIN SODIUM 5000 [USP'U]/ML
5000 INJECTION INTRAVENOUS; SUBCUTANEOUS EVERY 12 HOURS
Qty: 0 | Refills: 0 | Status: DISCONTINUED | OUTPATIENT
Start: 2018-09-11 | End: 2018-09-13

## 2018-09-11 RX ORDER — GLIMEPIRIDE 1 MG
1 TABLET ORAL
Qty: 0 | Refills: 0 | COMMUNITY

## 2018-09-11 RX ORDER — DIPHENHYDRAMINE HCL 50 MG
50 CAPSULE ORAL
Qty: 0 | Refills: 0 | COMMUNITY
Start: 2018-09-11

## 2018-09-11 RX ADMIN — HALOPERIDOL DECANOATE 2 MILLIGRAM(S): 100 INJECTION INTRAMUSCULAR at 13:22

## 2018-09-11 RX ADMIN — Medication 3 UNIT(S): at 13:33

## 2018-09-11 RX ADMIN — DIVALPROEX SODIUM 750 MILLIGRAM(S): 500 TABLET, DELAYED RELEASE ORAL at 13:22

## 2018-09-11 RX ADMIN — Medication 8: at 13:33

## 2018-09-11 RX ADMIN — INSULIN GLARGINE 15 UNIT(S): 100 INJECTION, SOLUTION SUBCUTANEOUS at 21:26

## 2018-09-11 NOTE — DISCHARGE NOTE ADULT - HOSPITAL COURSE
53 yo F with PMH significant for schizoaffective disorder-bipolar type, Type 2 Diabetes and Colon cancer with metastasis to the liver, who was admitted under medical service for hyperglycemia secondary to medication noncompliance in the setting of active delusion where pt states that she is cured of her conditions. Pt was managed on the medical floor for elevated blood glucose with daily insulin administration and close monitoring of blood glucose. Pt at times refused medications, however on day of discharge began taking her medications again. Pt was also evaluated by [facility] Psychiatry team for active delusions/psychosis with adjustment in her anti-psychotic medications. Pt is stable from a medical standpoint, and is being transferred to the Psychiatric unit at [facility] for continued management of her psychiatric condition.    Vital Signs  T(F): --98.8  HR: --94  BP: --136/85  RR: --18  SpO2: --100% RA    PE:  PHYSICAL EXAM:  Constitutional: NAD, awake and alert, calmly lying in bed at the time seen  HEENT: EOMI, Normal Hearing, MMM  Neck: Soft, No LAD, No JVD  Respiratory: Breath sounds are clear bilaterally, no wheezing  Cardiovascular: +S1 and S2, regular rate and rhythm, no murmurs, gallops or rubs  Gastrointestinal: Bowel Sounds present, soft, nontender, nondistended, no guarding, no rebound  Extremities: No peripheral edema  Vascular: 2+ peripheral pulses  Neurological: A/O x 3, no focal deficits  Musculoskeletal: 5/5 strength b/l upper and lower extremities    POCT  Blood Glucose (09.11.18 @ 13:26)    POCT Blood Glucose.: 316 mg/dL    Comprehensive Metabolic Panel in AM (09.09.18 @ 05:24)    Sodium, Serum: 139 mmol/L    Potassium, Serum: 3.6 mmol/L    Chloride, Serum: 109 mmol/L    Carbon Dioxide, Serum: 17 mmol/L    Anion Gap, Serum: 13 mmol/L    Blood Urea Nitrogen, Serum: 7 mg/dL    Creatinine, Serum: 0.55 mg/dL    Glucose, Serum: 289 mg/dL    Calcium, Total Serum: 9.2 mg/dL    Protein Total, Serum: 7.5 gm/dL    Albumin, Serum: 3.0 g/dL    Bilirubin Total, Serum: 0.4 mg/dL    Alkaline Phosphatase, Serum: 349 U/L    Aspartate Aminotransferase (AST/SGOT): 56 U/L    Alanine Aminotransferase (ALT/SGPT): 96 U/L     Hemoglobin A1C, Whole Blood (09.08.18 @ 07:10)    Hemoglobin A1C, Whole Blood: 13.9 53 yo F with PMH significant for schizoaffective disorder-bipolar type, Type 2 Diabetes and Colon cancer with metastasis to the liver, who was admitted under medical service for hyperglycemia secondary to medication noncompliance in the setting of active delusion where pt states that she is cured of her conditions. Pt was managed on the medical floor for elevated blood glucose with daily insulin administration and close monitoring of blood glucose. Pt at times refused medications, however on day of discharge began taking her medications again. Pt was also evaluated by [facility] Psychiatry team for active delusions/psychosis with adjustment in her anti-psychotic medications. Pt is stable from a medical standpoint, and is being transferred to the Psychiatric unit at [facility] for continued management of her psychiatric condition. Pt's sister (Juan José) has been contacted and informed/updated about current management plan and transfer to Psychiatric Unit (5N), which she agreed.    Vital Signs  T(F): --98.8  HR: --94  BP: --136/85  RR: --18  SpO2: --100% RA    PE:  PHYSICAL EXAM:  Constitutional: NAD, awake and alert, calmly lying in bed at the time seen  HEENT: EOMI, Normal Hearing, MMM  Neck: Soft, No LAD, No JVD  Respiratory: Breath sounds are clear bilaterally, no wheezing  Cardiovascular: +S1 and S2, regular rate and rhythm, no murmurs, gallops or rubs  Gastrointestinal: Bowel Sounds present, soft, nontender, nondistended, no guarding, no rebound  Extremities: No peripheral edema  Vascular: 2+ peripheral pulses  Neurological: A/O x 3, no focal deficits  Musculoskeletal: 5/5 strength b/l upper and lower extremities    POCT  Blood Glucose (09.11.18 @ 13:26)    POCT Blood Glucose.: 316 mg/dL    Comprehensive Metabolic Panel in AM (09.09.18 @ 05:24)    Sodium, Serum: 139 mmol/L    Potassium, Serum: 3.6 mmol/L    Chloride, Serum: 109 mmol/L    Carbon Dioxide, Serum: 17 mmol/L    Anion Gap, Serum: 13 mmol/L    Blood Urea Nitrogen, Serum: 7 mg/dL    Creatinine, Serum: 0.55 mg/dL    Glucose, Serum: 289 mg/dL    Calcium, Total Serum: 9.2 mg/dL    Protein Total, Serum: 7.5 gm/dL    Albumin, Serum: 3.0 g/dL    Bilirubin Total, Serum: 0.4 mg/dL    Alkaline Phosphatase, Serum: 349 U/L    Aspartate Aminotransferase (AST/SGOT): 56 U/L    Alanine Aminotransferase (ALT/SGPT): 96 U/L     Hemoglobin A1C, Whole Blood (09.08.18 @ 07:10)    Hemoglobin A1C, Whole Blood: 13.9

## 2018-09-11 NOTE — DISCHARGE NOTE ADULT - PLAN OF CARE
Treatment of hyperglycemic state and prevention of complication of possible DKA - Blood glucose management with AM and PM Lantus and ISS  - Continue Lantus and ISS  -Continue close monitoring of BG Treatment of active delusion - Pt was seen and examined by Psychiatric team with adjustment in psychiatric medication.  - Pt is being transferred to  for continued care for Psychiatric issue  - Continue psych medications  - Continue Continue long-term care management - [place] and have requested for pt's medical records regarding cancer treatment thus far to be sent to [facility]. - Pt was seen and examined by Psychiatric team with adjustment in psychiatric medication.  - Pt is being transferred to  for continued care for Psychiatric issue. Pt's sister (Juan José) has been contacted and informed, which she agrees to.  - Continue psych medications  - Continue

## 2018-09-11 NOTE — PROGRESS NOTE BEHAVIORAL HEALTH - NSBHCHARTREVIEWVS_PSY_A_CORE FT
Vital Signs Last 24 Hrs  T(C): 37.1 (10 Sep 2018 12:29), Max: 37.1 (10 Sep 2018 12:29)  T(F): 98.8 (10 Sep 2018 12:29), Max: 98.8 (10 Sep 2018 12:29)  HR: 94 (10 Sep 2018 12:29) (94 - 94)  BP: 136/85 (10 Sep 2018 12:29) (136/85 - 136/85)  BP(mean): --  RR: 18 (10 Sep 2018 12:29) (18 - 18)  SpO2: 100% (10 Sep 2018 12:29) (100% - 100%)

## 2018-09-11 NOTE — H&P ADULT - ASSESSMENT
53 y/o female with history of Schizoaffective disorder bipolar type, DM2, Stage 4 colon cancer with new mets to the liver s/p chemo 5yrs ago and radiation, corneal ulcer admitted for psychosis in the context of medical non-compliance.    # Schizoaffective disorder bipolar type  -Primary management per psych     # IDDM, uncontrolled 2/2 meds non-compliance   Last Hgb A1c = 13.9% (9/8/18)  -Continue with ISS  -Continue with Lantus 10 units am and 15 units qhs  -Continue with Humalog 3 units TIDAC  -Diabetic diet  -Endo consult     # HTN  -Continue with Lisinopril 5 mg daily    # Colon cancer with mets to liver   -Pt seen by Hem/onc while on medical unit however to refused to speak to Dr. Schneider  -F/u with hem/onc outpatient

## 2018-09-11 NOTE — CHART NOTE - NSCHARTNOTEFT_GEN_A_CORE
The patient is a 52 year old , unemployed,  female, with psychiatric h/o schizoaffective d/o-bipolar type with multiple inpatient psychiatric admissions and medication non-compliance. The patients’ medical history includes: DM II and Stage 4 colon cancer with newly discovered metastases to liver, s/p chemotherapy 5yrs ago with radiation therapy and corneal ulcers. The patient was brought to the ED by law enforcement on 9/7/2018 due to erratic behavior, hostility and aggressiveness towards . Per [facility] provider, patient with h/o medication noncompliance for approx. one-year. Pt was admitted to Medicine on 9/7/18 to stabilize DM and elevated blood glucose. She denied auditory and visual hallucinations, but was observed responding to internal stimuli, and talking to herself. She appears to have little or no insight and judgment into her medical or her psychiatric issues, and believes that she is healthy and does not need treatment with medications.     The patient was transferred from [facility] to [facility] on 9/11/18. The patient was oriented to the unit and staff. Patient was seen by this writer in her room, she was cooperative, but appears guarded and suspicious. The patient denies suicidal and homicidal ideations, and denies auditory and visual hallucinations, however she appears to be responding to internal stimuli and she presents as being internally preoccupied. Patient reported to [facility] staff that she is an [employer] employee, and that she has a PhD. Additionally, per [facility] staff, the patient requested filet alessandro for dinner, lobster, creamed spinach, a bowl of fruit with a glass of wine. Staff on [facility] with admission orders and will continue to provide patient supportive care and will monitor the patient closely during the overnight. The patient is a 52 year old , unemployed,  female, with psychiatric h/o schizoaffective d/o-bipolar type with multiple inpatient psychiatric admissions and medication non-compliance. The patients’ medical history includes: DM II and Stage 4 colon cancer with newly discovered metastases to liver, s/p chemotherapy 5yrs ago with radiation therapy and corneal ulcers. The patient was brought to the ED by law enforcement on 9/7/2018 due to erratic behavior, hostility and aggressiveness towards . [facility], patient with h/o medication noncompliance for approx. one-year. Pt was admitted to [facility] on 9/7/18 to stabilize DM and elevated blood glucose. She denied auditory and visual hallucinations, but was observed responding to internal stimuli, and talking to herself. She appears to have little or no insight and judgment into her medical or her psychiatric issues, and believes that she is healthy and does not need treatment with medications.     The patient was transferred from [facility] to [facility] on 9/11/18. The patient was oriented to the unit and staff. Patient was seen by this writer in her room, she was cooperative, but appears guarded and suspicious. The patient denies suicidal and homicidal ideations, and denies auditory and visual hallucinations, however she appears to be responding to internal stimuli and she presents as being internally preoccupied. Patient reported to [facility] staff that she is an [employer] employee, and that she has a PhD. Additionally, per [facility] staff, the patient requested filet alessandro for dinner, lobster, creamed spinach, a bowl of fruit with a glass of wine. Staff on [facility] with admission orders. Staff will continue to provide supportive care and closely monitor the patient for safety and comfort during the overnight.    Vital Signs:   T(C): 37 (09-11-18 @ 16:35), Max: 37 (09-11-18 @ 16:35)  T(F): 98.6 (09-11-18 @ 16:35), Max: 98.6 (09-11-18 @ 16:35)  RR: 18 (09-11-18 @ 16:35) (18 - 18)  SpO2: 100% (09-11-18 @ 16:35) (100% - 100%)  T(C): 37 (09-11-18 @ 16:35), Max: 37 (09-11-18 @ 16:35)  Orthostatic BP/HR: Sitting 134/85 HR 90 Standing 123/81 HR 98    POCT  Blood Glucose (09.11.18 @ 13:26) POCT Blood Glucose.: 316 mg/dL The patient is a 52 year old , unemployed,  female, with psychiatric h/o schizoaffective d/o-bipolar type with multiple inpatient psychiatric admissions and medication non-compliance. The patients’ medical history includes: DM II and Stage 4 colon cancer with newly discovered metastases to liver, s/p chemotherapy 5yrs ago with radiation therapy and corneal ulcers. The patient was brought to the ED by law enforcement on 9/7/2018 due to erratic behavior, hostility and aggressiveness towards . Per [facility] provider, patient with h/o medication noncompliance for approx. one-year. Pt was admitted to Medicine on 9/7/18 to stabilize DM and elevated blood glucose. She denied auditory and visual hallucinations, but was observed responding to internal stimuli, and talking to herself. She appears to have little or no insight and judgment into her medical or her psychiatric issues, and believes that she is healthy and does not need treatment with medications.     The patient was transferred from [facility] to [address] [place] [address] on 9/11/18. The patient was oriented to the unit and staff. Patient was seen by this writer in her room, she was cooperative, but appears guarded and suspicious. The patient denies suicidal and homicidal ideations, and denies auditory and visual hallucinations, however she appears to be responding to internal stimuli and she presents as being internally preoccupied. Patient reported to [facility] staff that she is an [employer] employee, and that she has a PhD. Additionally, per [facility] staff, the patient requested filet alessandro for dinner, lobster, creamed spinach, a bowl of fruit with a glass of wine. Staff on [facility] with admission orders. Staff will continue to provide supportive care and closely monitor the patient for safety and comfort during the overnight.    Vital Signs:   T(C): 37 (09-11-18 @ 16:35), Max: 37 (09-11-18 @ 16:35)  T(F): 98.6 (09-11-18 @ 16:35), Max: 98.6 (09-11-18 @ 16:35)  RR: 18 (09-11-18 @ 16:35) (18 - 18)  SpO2: 100% (09-11-18 @ 16:35) (100% - 100%)  T(C): 37 (09-11-18 @ 16:35), Max: 37 (09-11-18 @ 16:35)  Orthostatic BP/HR: Sitting 134/85 HR 90 Standing 123/81 HR 98    POCT  Blood Glucose (09.11.18 @ 13:26) POCT Blood Glucose.: 316 mg/dL The patient is a 52 year old , unemployed,  female, with psychiatric h/o schizoaffective d/o-bipolar type with multiple inpatient psychiatric admissions and medication non-compliance. The patients’ medical history includes: DM II and Stage 4 colon cancer with newly discovered metastases to liver, s/p chemotherapy 5yrs ago with radiation therapy and corneal ulcers. The patient was brought to the ED by law enforcement on 9/7/2018 due to erratic behavior, hostility and aggressiveness towards . Per [facility] provider, patient with h/o medication noncompliance for approx. one-year. Pt was admitted to Medicine on 9/7/18 to stabilize DM and elevated blood glucose. She denied auditory and visual hallucinations, but was observed responding to internal stimuli, and talking to herself. She appears to have little or no insight and judgment into her medical or her psychiatric issues, and believes that she is healthy and does not need treatment with medications.     The patient was transferred on 9/11/2018 from  medical unit to [facility] (status 2PC) for continued psychiatric care, family member aware of in-hospital transfer to . The patient was oriented to the unit and staff. Patient was seen by this writer in her room, she was cooperative, but was noted to be guarded and suspicious. The patient denies suicidal and homicidal ideations, and denies auditory and visual hallucinations, however she appears to be responding to internal stimuli and she presents as internally preoccupied. Patient reported to [facility] staff that she is an [employer] employee, and that she has a PhD. Additionally, per [facility] staff, the patient requested filet alessandro for dinner, lobster, creamed spinach, a bowl of fruit with a glass of wine. Staff on [facility] with admission orders. Staff will continue to provide supportive care and closely monitor the patient for safety and comfort during the overnight.    Vital Signs:   T(C): 37 (09-11-18 @ 16:35), Max: 37 (09-11-18 @ 16:35)  T(F): 98.6 (09-11-18 @ 16:35), Max: 98.6 (09-11-18 @ 16:35)  RR: 18 (09-11-18 @ 16:35) (18 - 18)  SpO2: 100% (09-11-18 @ 16:35) (100% - 100%)  T(C): 37 (09-11-18 @ 16:35), Max: 37 (09-11-18 @ 16:35)  Orthostatic BP/HR: Sitting 134/85 HR 90 Standing 123/81 HR 98    POCT  Blood Glucose (09.11.18 @ 13:26) POCT Blood Glucose.: 316 mg/dL The patient is a 52 year old , unemployed,  female, with past psychiatric history of schizoaffective d/o-bipolar type with multiple inpatient psychiatric admissions and h/o medication non-compliance. The patients’ medical history includes: DM II and Stage 4 colon cancer with newly discovered metastases to liver, s/p chemotherapy 5yrs ago with radiation therapy and corneal ulcers with vision impairment. The patient was brought to the ED by law enforcement on 9/7/2018 due to erratic behavior, hostility and aggressiveness towards . Per [facility] provider, patient with h/o medication noncompliance for approx. one-year. Pt was admitted to Medicine on 9/7/18 to stabilize DM and elevated blood glucose. She denied auditory and visual hallucinations, but was observed responding to internal stimuli, and talking to herself. Patient additionally appeared to have little or no insight and judgment into her medical or her psychiatric issues, and believes that she is healthy and does not need treatment with medications.     The patient was transferred on from [facility] to [facility] (status 2P) on 9/11/2018, for continued psychiatric inpatient treatment. The patients sister (Juan José) aware of in-hospital transfer to . The patient seen by this writer in her room and was oriented to the unit and [address] [place] [address] staff. She was cooperative, but highly guarded and suspicious. Patient denies suicidal and homicidal ideations, and denies auditory and visual hallucinations, but appears highly preoccupied as if responding to internal stimuli. Per [facility] staff patient reports she is an [employer] employee, and she has a PhD. Additionally, patient requested for dinner meal: filet mignon, lobster, creamed spinach and a bowl of fruit with a glass of wine. Staff on [facility] with inpatient  admission orders. Staff will continue to provide supportive care and will closely monitor the patient for safety & comfort during the overnight.    Vital Signs:   T(C): 37 (09-11-18 @ 16:35), Max: 37 (09-11-18 @ 16:35)  T(F): 98.6 (09-11-18 @ 16:35), Max: 98.6 (09-11-18 @ 16:35)  RR: 18 (09-11-18 @ 16:35) (18 - 18)  SpO2: 100% (09-11-18 @ 16:35) (100% - 100%)  T(C): 37 (09-11-18 @ 16:35), Max: 37 (09-11-18 @ 16:35)  Orthostatic BP/HR: Sitting 134/85 HR 90 Standing 123/81 HR 98    POCT  Blood Glucose (09.11.18 @ 13:26) POCT Blood Glucose.: 316 mg/dL

## 2018-09-11 NOTE — PATIENT PROFILE BEHAVIORAL HEALTH - VISION (WITH CORRECTIVE LENSES IF THE PATIENT USUALLY WEARS THEM):
Normal vision: sees adequately in most situations; can see medication labels, newsprint/Patient does wear glasses and has them with her

## 2018-09-11 NOTE — DISCHARGE NOTE ADULT - PATIENT PORTAL LINK FT
You can access the [facility] Patient Portal, offered by [facility], by registering with the following website: [email or web address]

## 2018-09-11 NOTE — PATIENT PROFILE BEHAVIORAL HEALTH - REASON FOR ADMISSION
Patient is a 52 year old White female, brought into the ED by , after  reported patient has been having auditory hallucinations. Patient has a psych history of Schizoaffective Disorder, with multiple prior psych hospitalizations.  Her medical history includes Stage 4 Colon Cancer, Diabetes, and Hyperlipidemia.  She has no known drug allergies.  Her legal status is 9.27, 2PC.  Her admitting diagnosis is Schizoaffective Disorder, Bipolar Type, and Non-compliance.  Her admitting physician is Dr. Garrett.   Per report, patient gets Haldol Deaconate Injection, and the last one was given to her on 8/16/2018; however, she has not been compliant with her other medications x1 year.  On the medical unit, patient was sporadic with her finger sticks and her medication intake.  She denied auditory and visual hallucinations, but was observed responding to internal stimuli, and talking to herself.  During interview, patient observed blunted, constricted, disorganized, guarded, suspicious, and responding to internal stimuli.  She denies suicidal and homicidal ideations, and denies auditory and visual hallucinations, even though she was clearly preoccupied.  She reported that she works for the [employer], and has a PhD “in everything”.  Patient also told staff she wanted filet alessandro for dinner, lobster, creamed spinach, a bowl of fruit and a glass of wine.  She is grandiose, delusional, and paranoid.  She denied drug and alcohol use/abuse, and denied being a smoker.  She presented as delusional, with no insight and judgment into either her medical condition, or her psychiatric condition.  She believes that she is healthy and does not need medication.  Continue supportive care and safety.  Continue monitor patient closely.

## 2018-09-11 NOTE — PROGRESS NOTE BEHAVIORAL HEALTH - PROBLEM SELECTOR PLAN 2
PT is refusing her meds. That si her baseline for the last 1 year while kin treatment in [facility].

## 2018-09-11 NOTE — H&P ADULT - HISTORY OF PRESENT ILLNESS
53 y/o female with history of Schizoaffective disorder bipolar type, DM2, Stage 4 colon cancer with new mets to the liver s/p chemo 5yrs ago and radiation, corneal ulcer was brought to the ED by law enforcement with the complaints of erratic behaviour, hostility and aggressiveness towards . As per , pt stopped taking all her medications and stopped following up with oncologist and endocrinologist because she believed she has been cured and has no medical issues. In the ED, Pt was noted to have hyperglycemia and was medically managed on medical unit before she was transferred to inpatient psychiatry for psychosis.

## 2018-09-11 NOTE — CHART NOTE - NSCHARTNOTEFT_GEN_A_CORE
Patient was referred to [facility] in 2010 after an admission at [facility]. (This was her last psych admission)  [facility] took her for med over objection and she has been stable until this past few months.  She got an IM of Haldol 50 mg on 6/22 and then again on 8/16 50 mg.  She has refused all Depakote levels for the past few months and/or any PO meds.  She has attended her MD appointments at [facility] but has not attended her therapy which she never misses.  Her therapist is Ese Oviedo.  Her family has been complaining that she is over spending, paranoid, accusing  of cheating and religiously preoccupied.  She is a medicare/ [employer] case.

## 2018-09-11 NOTE — DISCHARGE NOTE ADULT - PROVIDER TOKENS
FREE:[LAST:[francisco j],FIRST:[Zarina],PHONE:[(   )    -],FAX:[(   )    -]],TOKEN:'[id number]',FREE:[LAST:[Roberto],FIRST:[christine],PHONE:[phone]],FAX:[(   )    -],ADDRESS:[[facility]FREE:[LAST:[Tanesha],FIRST:[Baltazar],PHONE:[phone]:[(   )    -],ADDRESS:[Endocrinology]]

## 2018-09-11 NOTE — DISCHARGE NOTE ADULT - CARE PROVIDER_API CALL
Alexandra Mcgowan  : 1948 [address] [facility],[address]  [address] [place] [address].  [phone]   [phone] -       OUTPATIENT PHYSICAL THERAPY:Daily Note 2018        ICD-10: Treatment Diagnosis: Pain in right hip (M25.551)  Pain in right knee (M25.561)  Pain in left knee (M25.562)  Difficulty in walking, not elsewhere classified (R26.2)  Precautions/Allergies:   Codeine; Erythromycin; and Tetracycline   Fall Risk Score: 1 (? 5 = High Risk)  MD Orders: Evaluate and Treat: strengthening exercises/ aquatic therapy MEDICAL/REFERRING DIAGNOSIS:  Hip pain [M25.559]   DATE OF ONSET: 10/2017  REFERRING PHYSICIAN: Cal Dai., *  RETURN PHYSICIAN APPOINTMENT: late 2018     INITIAL ASSESSMENT:  Ms. Linwood Campbell (pt) is a 68year old WF seen for physical therapy per MD orders with complaint of R hip pain, low back pain and B knee pain with long history of back pain and arthritis. Pt evaluated for outpatient physical therapy today with the following deficits: low back, right hip and bilateral knee pain, decreased strength in B hip. Pt would benefit from physical therapy to address the above deficits in order to return to increased independence with functional mobility with decreased pain. Pt would benefit from initially working in aquatic setting to off load spine, bilateral lower extremities while addressing goals. As patient progresses, plan to transition to gravity challenging, land based exercises/ activities. Plan of care and goals reviewed with patient who verbalizes agreement. PROBLEM LIST (Impacting functional limitations):  1. Decreased Strength  2. Decreased ADL/Functional Activities  3. Decreased Ambulation Ability/Technique  4. Decreased Balance  5. Increased Pain  6. Decreased Activity Tolerance  7. Decreased [place] with Home Exercise Program INTERVENTIONS PLANNED:  1. Balance Exercise  2. Gait Training  3.  Home Exercise Program (HEP)  4. Manual Therapy  5. Neuromuscular Re-education/Strengthening  6. Range of Motion (ROM)  7. Therapeutic Activites  8. Therapeutic Exercise/Strengthening  9. modalities   10. aquatics    TREATMENT PLAN:  Effective Dates: 4/4/2018 TO 7/3/2018 (90 days). Frequency/Duration: 2-3 times a week for 90 Days    GOALS: (Goals have been discussed and agreed upon with patient.)  Short-Term Functional Goals: Time Frame: 2 weeks  Pt will demonstrate independence/ compliance with home exercise program for management of symptoms (aquatic). (met 04/27/18)  Pt will demonstrate improved Lower Extremity Functional Scale by 3-4 points for improved functional mobility. (in progress 04/27/18)  Pt will tolerate 35 to 40 minutes of aquatic therapy with pain of 2/10. (met 04/27/18)      Discharge Goals: Time Frame: 8 weeks  Pt will demonstrate improved manual muscle test score by 1/2 to 1  grade for R hip complex to participate in heavy housework activities and yard work. (in progress 04/27/18)  Pt will ambulate >2000ft with normal gait pattern with even stance time/ step length for bilateral lower extremities for community entry.  (in progress 04/27/18)  Pt will report Lower Extremity Functional Scale score of 50/80 for improved functional mobility in home/ community setting. (in progress 04/27/18)  4. Pt will demonstrate independence/ compliance with home exercise program for management of symptoms (land). (in progress 04/27/18)    Rehabilitation Potential For Stated Goals: Good                 HISTORY:   History of Present Injury/Illness (Reason for Referral):  Pt is 67 y/o WF seen for PT per MD orders following L DEYSI 10/2017 with multiple complications/ illness over the winter with noted weakness and debility. Pt has complicated hx of arthritis, chronic low back pain as well as spinal stenosis. Pt has sx hx of B TKA (2009, 2011) along with recent hip replacement.   Pt reports her goal is to get stronger so she can do yard work and housework as well as get into community to visit with friends. Past Medical History/Comorbidities:   Ms. José Nichols  has a past medical history of Arthritis; Asthma; Chronic obstructive asthma, unspecified; Chronic pain; DDD (degenerative disc disease); GERD (gastroesophageal reflux disease); Hypertension; Hypothyroidism; HYPOTHYROIDISM; Incontinence of urine; Morbid obesity ([address] Neuropathy; ILYA (obstructive sleep apnea) (9/16/2009); Osteoarthritis of left knee (9/16/2009); Osteoarthritis of right knee (3/2/2011); S/P total knee replacement using cement (9/16/2009); Sleep apnea; Spastic colon; Status post right hip replacement (10/9/2017); and Type II or unspecified type diabetes mellitus without mention of complication, not stated as uncontrolled. She also has no past medical history of Adverse effect of anesthesia; Aneurysm ([address] Arrhythmia; Autoimmune disease ([address] CAD (coronary artery disease); Cancer ([address] Chronic kidney disease; Chronic obstructive pulmonary disease ([address] Coagulation disorder ([address] Difficult intubation; Endocarditis; Heart failure ([address] Ill-defined condition; Liver disease; Malignant hyperthermia due to anesthesia; Nausea & vomiting; Nicotine vapor product user; Non-nicotine vapor product user; Pseudocholinesterase deficiency; Psychiatric disorder; PUD (peptic ulcer disease); Rheumatic fever; Seizures ([address] Stroke [facility]); or Thromboembolus ([address] Ms. José Nichols  has a past surgical history that includes hx hernia repair (1/30/08); hx hysterectomy (2000); pr hand/finger surgery unlisted (Right, 1990'S); pr total knee arthroplasty (Left, 2009); and hx knee replacement (Right, 2011). Social History/Living Environment:     Pt lives in one story home with 4 steps to enter with unilateral handrail. Pt lives alone.   Prior Level of Function/Work/Activity:  Retired   Dominant Side:         RIGHT  Current Medications:    Current Outpatient Prescriptions:     amLODIPine (NORVASC) 5 mg tablet, Take 5 mg by mouth daily. , Disp: , Rfl:     ciprofloxacin HCl (CIPRO) 500 mg tablet, Take  by mouth two (2) times a day., Disp: , Rfl:     neomycin-colist-hydrocortisone-thonzonium (CORTISPORIN-TC) otic suspension, by Otic route four (4) times daily. , Disp: , Rfl:     Azelastine (ASTEPRO) 0.15 % (205.5 mcg) nasal spray, 2 Sprays by Both Nostrils route as needed. , Disp: , Rfl:     fluticasone (FLONASE ALLERGY RELIEF) 50 mcg/actuation nasal spray, 2 Sprays by Both Nostrils route as needed for Rhinitis., Disp: , Rfl:     budesonide (PULMICORT) 0.5 mg/2 mL nbsp, 500 mcg by Nebulization route two (2) times a day., Disp: , Rfl:     formoterol (PERFOROMIST) 20 mcg/2 mL nebu neb solution, 20 mcg by Nebulization route two (2) times a day., Disp: , Rfl:     Lactobacillus acidoph-L.bulgar (RUMA ASSIST) 100 million cell pwpk, Take  by mouth., Disp: , Rfl:     Biotin 2,500 mcg cap, Take  by mouth., Disp: , Rfl:     cranberry extract 450 mg tab tablet, Take 450 mg by mouth., Disp: , Rfl:     OTHER, Vein and leg support--- 1 tab po qd, Disp: , Rfl:     estradiol (ESTRACE) 0.01 % (0.1 mg/gram) vaginal cream, Insert 1 g into vagina daily. , Disp: , Rfl:     neomycin-polymyxin-hydrocortisone, buffered, (PEDIOTIC) 3.5-10,000-1 mg/mL-unit/mL-% otic suspension, Administer 2 Drops into each ear daily. , Disp: , Rfl:     traMADol (ULTRAM) 50 mg tablet, Take 50 mg by mouth every six (6) hours as needed for Pain., Disp: , Rfl:     HYDROmorphone (DILAUDID) 2 mg tablet, Take 1 Tab by mouth every four (4) hours as needed. Max Daily Amount: 12 mg., Disp: 40 Tab, Rfl: 0    SITagliptin (JANUVIA) 100 mg tablet, Take 100 mg by mouth daily. , Disp: , Rfl:     lidocaine-kinesiology tape 5 % kit, by Apply Externally route., Disp: , Rfl:     OTHER, three (3) times daily. Lidocaine patch 5%, Disp: , Rfl:     oxybutynin chloride XL (DITROPAN XL) 5 mg CR tablet, Take 5 mg by mouth daily. , Disp: , Rfl:    polyethylene glycol (MIRALAX) 17 gram packet, Take 17 g by mouth as needed. , Disp: , Rfl:     cpap machine kit, by Does Not Apply route. 9 cm H2O, Disp: , Rfl:     FLUTICASONE/SALMETEROL (ADVAIR DISKUS IN), Take 2 Puffs by inhalation two (2) times daily as needed. , Disp: , Rfl:     celecoxib (CELEBREX) 200 mg capsule, Take 200 mg by mouth daily. 1 to 2 a day. , Disp: , Rfl:     PREGABALIN (LYRICA PO), Take 150 mg by mouth two (2) times a day., Disp: , Rfl:     montelukast (SINGULAIR) 10 mg tablet, Take 10 mg by mouth daily. , Disp: , Rfl:     NEOMY SULF/POLYMYX B SULF/HC (CORTISPORIN OT), by Otic route as needed. 1 to 2 drops in each ear as needed. , Disp: , Rfl:     MULTIVITAMIN WITH MINERALS (MULTIVITAMIN & MINERAL FORMULA PO), Take 1 Tab by mouth daily. Multivitamin with D with Calcium , Disp: , Rfl:     metaxalone (SKELAXIN) 800 mg tablet, Take 1 Tab by mouth three (3) times daily as needed for Pain. (Patient taking differently: Take 800 mg by mouth two (2) times a day.), Disp: 1 Tab, Rfl: 0    OMEPRAZOLE PO, 20 mg daily. Pt. Instructed to take morning of surgery per anesthesiologist.  , Disp: , Rfl:     CYANOCOBALAMIN (VITAMIN B-12 PO), Take 500 mg by mouth daily. , Disp: , Rfl:     LISINOPRIL-HYDROCHLOROTHIAZIDE 10-12.5 mg per tablet, take 1 Tab by mouth daily. , Disp: , Rfl:     LASIX 20 mg Tab, Take 20 mg by mouth daily as needed. , Disp: , Rfl:     GLYBURIDE-METFORMIN 2.5-500 mg per tablet, take 1 Tab by mouth two (2) times a day., Disp: , Rfl:     SYNTHROID 100 mcg Tab, Take 100 mcg by mouth daily. Pt. Instructed to take morning of surgery per anesthesiologist.  , Disp: , Rfl:     PATANOL 0.1 % Drop, 2 Drops by Both Eyes route as needed for Allergies. , Disp: , Rfl:     AMBIEN 10 mg Tab, Take 10 mg by mouth nightly., Disp: , Rfl:     LIBRAX, WITH CLINIDIUM, 2.5-5 mg Cap, Take 1 Cap by mouth four (4) times daily as needed. , Disp: , Rfl:    Date Last Reviewed:  5/1/2018   EXAMINATION: Observation/Orthostatic Postural Assessment:          Morbid obesity  Palpation:          Pitting edema in B ankle  Circumferential measurements:  30 cm R/L (above malleolus)  ROM:  BUE/ cervical WNL for patient                     Date:  04/04/18 Date:  04/27/18  Grossly WFL Date:     Trunk ROM 25 % limitation                          LE ROM Left Right       Hip Flexion [facility]       Hip Abduction [facility]       Straight Leg Raise (SLR) N/T N/T                Knee Flexion 125 125       Knee Extension 0 0                Ankle Dorsiflexion [facility] WFL         Strength:     Date:  04/04/18 Date:  04/27/18  Grossly 4- to 4/5 Date:     LE MMT Left Right Left Right Left Right   Hip Flex (L1/L2) 4/5 4-/5  -4/5     Hip Abd (glut med) 4/5 4/5       Hip Ext 4-/5 4-/5  -4/5     Quad    ( L3/4) 4/5 4-/5  4/5     Hamstring 4/5 4-/5  4/5     Anterior Tib (L4/L5)         Gastroc (S1/2)         EHL (L5)                             Special Tests:   deferred  Neurological Screen:        Sensation: Intact for light touch  Functional Mobility:         Gait/Ambulation:  Amb without A.D with increased lateral wt shift BLE lumbering gait pattern        Transfers:  Sit to stand to sit with use of BUE for support        Bed Mobility:  independent  Balance:          Single Leg Stance:  Unable to assume single leg stance. Good static standing balance   Body Structures Involved:  1. Nerves  2. Bones  3. Joints  4. Muscles  5. Ligaments Body Functions Affected:  1. Sensory/Pain  2. Neuromusculoskeletal  3. Movement Related Activities and Participation Affected:  1. General Tasks and Demands  2. Mobility  3. Self Care  4. Domestic Life  5. Community, Social and Civic Life   CLINICAL DECISION MAKING:   Outcome Measure:    Tool Used: Lower Extremity Functional Scale (LEFS)  Score:  Initial: 40/80 Most Recent: 42/80 (Date: 04/27/18 )   Interpretation of Score: 20 questions each scored on a 5 point scale with 0 representing \"extreme difficulty or unable to perform\" and 4 representing \"no difficulty\". The lower the score, the greater the functional disability. 80/80 represents no disability. Minimal detectable change is 9 points. Score 80 79-63 62-48 47-32 31-16 15-1 0   Modifier CH CI CJ CK CL CM CN     ? Mobility - Walking and Moving Around:     - CURRENT STATUS: CK - 40%-59% impaired, limited or restricted    - GOAL STATUS: CJ - 20%-39% impaired, limited or restricted    - D/C STATUS:  ---------------To be determined---------------    Medical Necessity:   · Patient is expected to demonstrate progress in strength, range of motion and balance to increase independence with functional mobility with decreased pain. Reason for Services/Other Comments:  · Patient continues to require modification of therapeutic interventions to increase complexity of exercises. TREATMENT:   (In addition to Assessment/Re-Assessment sessions the following treatments were rendered)    Therapeutic Exercise: (see flow sheet below for minutes) :  Exercises per grid below to improve mobility, strength and balance. Required minimal visual and verbal cues to promote proper body alignment and promote proper body mechanics. Progressed resistance and range as indicated. Aquatic Therapy (see flow sheet below for minutes): Aquatic treatment performed per flow grid for Decreased muscle strength, Decreased static/dynamic balance and reactive control, Decreased range of motion, Decreased activity endurance and Ease of movement. Cues provided for technique. Assistance by therapist provided for progression of exercises/ activities. Patient has difficulty with low back/ R hip pain.      Date: 04/04/18  (EVAL) 4/10/18 4/16/18 4/17/18 04/20/18 04/26/18 04/27/18 04/30/18 5/1/18   Modalities:                                                Manual Therapy:                                                Aquatic Exercise:  1.5#/55 minutes 1.5# 60 min 2.5# 50 min 2.5# 55 min 2.5# 55 min 2.5# 60 min 2.5# 60 min 3.75# 60 min   Gait F/B/S/M  x6L F  x4L B/S/M x4L ea x4L X4L X4L c open paddles X4L with open paddles X4L c open paddles x4L c open paddles   Heel/Toe walk            3-way  x15 BLE x15 BLE x15 BLE   X15 BLE     PF/DF  x15 x15  x20 x20 x20     Hamstring Curls  x15 BLE x4L x4L x2L x20 BLE x2L x4L x4L   Hip Flexion with knee ext. (rockette)  next  x15 BLE x2L x2L x2L x4L x4L   Squats    next x15  x20  X15 pull downs with open paddles     SLR march  x4L x4L x4L x4L X4L x4L x4L x4L c open paddles   Lunges         x10 BLE   Hip circles            Hip horizontal abduction/adduction     x20 BLE x20 BLE X20 BLE     Core: tray         x4L push/pull   Core:flex/ext UE in mini squat      x15 alternating LE stance c open paddles  x15 c open paddles  x20 c closed paddles    Core: horz. abd/add UE in modified tandem stance      x15 c open paddles X15 with open paddles x20 c closed paddles x20 c closed paddles   Deep well bike  3 minutes 3min 3 min 3 min 3 min 3 min 3 min 3 min   Deep well jumping nav  1 minute 2 min  1 min  1 min 1 min 1 min   Deep well scissors  1 minute 2 min  1 min 1 min 1 min 1 min 1 min   Deep well:  traction  5 minutes 8 min 8 min 8 min 8 min 5 min 8 min 8 min               Hamstring Stretch  2 H 30 BLE 2h30 2H30 BLE 2H30 BLE  2H30 BLE 2H30 BLE 2H30 BLE   Step Lunge            Piriformis stretch            PF Stretch            Balance: Single leg Stance            Balance: Tandem        H30 BLE no LOB                              Therapeutic Exercise:                                                                                    F=forward  B=Backward  S=sidesteps  M=marches    H=hold    Manual: (see flow sheet above for minutes)   Modalities: (see flow sheet above for minutes)     HEP: Plan to instruct in HEP as therapy progresses  Treatment/Session Assessment: Patient arrived 10 minutes late for PT session again today.  Continued in aquatic exercises increasing ankle weights to 3.75#. Patient appeared short of breath with forward head posture (due to cervical pain). Added core tray walk with patient demonstrating unsteady gait with resistance. Discussed with patient working on land in 1-2 weeks, alternating land and water if needed, patient appeared opened to alternating land and water. Plan to continue in pool setting next visit. · Pain/ Symptoms: Initial:   0/10  Patient reports no pain prior to PT today     Patient states she was able to get on her riding mower and mow grass this morning and was able to change a flat tire on her  as well. Patient reports she has appointment with Dr Moustapha Brennan in next 2-3 weeks. Post Session:  0/10 following deep well traction   ·   Compliance with Program/Exercises: Pt reports not consistently  Recommendations/Intent for next treatment session: \"Next visit will focus on advancements to more challenging activities\". Plan to continue in aquatic setting next visit.   Total Treatment Duration:  PT Patient Time In/Time Out  Time In: 1340  Time Out: 1440     Priyanka Boston PTA Zarina marx  Phone: (   )    -  Fax: (   )    -    Yovani Marx), Family Medicine  [address] [place] [address]  Phone: [phone]  Fax: [phone]    christine Mejia  [facility]  Phone: [phone]  Fax: (   )    -    Baltazar Almendarez  Endocrinology  Phone: [phone]  Fax: (   )    -

## 2018-09-11 NOTE — H&P ADULT - ATTENDING COMMENTS
53 y/o F PMHx as noted above admitted to inpatient Psychiatry for further management of schizoaffective disorder bipolar type.    1)Schizoaffective disorder bipolar type  ~cont. management per Psychiatry    2)IDDM  ~cont. ADA Diet  ~FS qAC  ~cont. Basal/Bolus Insulin regimen    3)Hypertension  ~cont. Lisinopril 5mg po daily    4)Colon cancer with mets to liver   ~cont. management as noted above   ~f/u w/ Hematology/Oncology

## 2018-09-11 NOTE — DISCHARGE NOTE ADULT - CARE PLAN
Principal Discharge DX:	Hyperglycemia  Goal:	Treatment of hyperglycemic state and prevention of complication of possible DKA  Assessment and plan of treatment:	- Blood glucose management with AM and PM Lantus and ISS  - Continue Lantus and ISS  -Continue close monitoring of BG  Secondary Diagnosis:	Delusion  Goal:	Treatment of active delusion  Assessment and plan of treatment:	- Pt was seen and examined by Psychiatric team with adjustment in psychiatric medication.  - Pt is being transferred to  for continued care for Psychiatric issue  - Continue psych medications  - Continue  Secondary Diagnosis:	Colon cancer  Goal:	Continue long-term care management  Assessment and plan of treatment:	- Has contacted pt's Oncologist, Dr. Mejia ([facility]) and have requested for pt's medical records regarding cancer treatment thus far to be sent to [facility]. Principal Discharge DX:	Hyperglycemia  Goal:	Treatment of hyperglycemic state and prevention of complication of possible DKA  Assessment and plan of treatment:	- Blood glucose management with AM and PM Lantus and ISS  - Continue Lantus and ISS  -Continue close monitoring of BG  Secondary Diagnosis:	Delusion  Goal:	Treatment of active delusion  Assessment and plan of treatment:	- Pt was seen and examined by Psychiatric team with adjustment in psychiatric medication.  - Pt is being transferred to  for continued care for Psychiatric issue. Pt's sister (Juan José) has been contacted and informed, which she agrees to.  - Continue psych medications  - Continue  Secondary Diagnosis:	Colon cancer  Goal:	Continue long-term care management  Assessment and plan of treatment:	- Has contacted pt's Oncologist, [place] Mejia [place] and have requested for pt's medical records regarding cancer treatment thus far to be sent to [facility].

## 2018-09-11 NOTE — PROGRESS NOTE BEHAVIORAL HEALTH - NSBHCONSULTFOLLOWDETAILS_PSY_A_CORE FT
pt si at her baseline, noncompliant with her meds for the last 1 year. pt is at her baseline, noncompliant with her meds for the last 1 year. Pt is psychotic. manic, no insight and poor judgement. She can not care for herself. Will transfer to 5 N, status 2PC.

## 2018-09-11 NOTE — PROGRESS NOTE BEHAVIORAL HEALTH - PROBLEM SELECTOR PLAN 1
Haldol dec 50mg IM q 4 weeks, next one is on 9/13/18.   Lower depakote to 500mg po BID, because pt is not compliant.   haldol PO 2mg BID.

## 2018-09-11 NOTE — PROGRESS NOTE BEHAVIORAL HEALTH - SUMMARY
52 YOWW with schizoaffective disorder bipolar Type. with colon cancer 4th stage with newly discovered liver mets, admitted via ER psychotic, initially presented manic and opsychotic due to noncompliance to meds.  She gets haldol dec injection and last one was on 8/16/18, but she is not compliant with other meds.   PT is not suicidal or homicidal. She is delusional, with no insight into her medical or psychiatric condition and  impaired judgment. Per FSL pt presents the same way for at least 1 year and the only change in behavior is that she stopped attending her group psychotherapy sessions.   Will lower depakote to 500mg PO BID because pt is noncompliant and in case that she agrees and takes her meds 1500mg can be too high dose.   Would increase haldol dec to 75 mg IM q 4 weeks, but pt skips the dose and 75 mg could be to high too. Continue Haldol dec 50mg IM q 4 weeks, next one due in 2 days.       C&L will f/u

## 2018-09-11 NOTE — PROGRESS NOTE BEHAVIORAL HEALTH - RISK ASSESSMENT
Pt is not at imminent risk to harm self  and others. However she is delusional and makes decisions based on her delusional thinking.

## 2018-09-11 NOTE — DISCHARGE NOTE ADULT - MEDICATION SUMMARY - MEDICATIONS TO TAKE
I will START or STAY ON the medications listed below when I get home from the hospital:    lisinopril 5 mg oral tablet  -- 1 tab(s) by mouth once a day  -- Indication: For Home medication    heparin  -- 5000 unit(s) subcutaneous 2 times a day  -- Indication: For Prophylactic measure    LORazepam  -- 2 milligram(s) intramuscular every 6 hours  -- Indication: For Schizoaffective disorder, bipolar type    Depakote  -- 750  by mouth once a day  -- Indication: For Schizoaffective disorder, bipolar type    insulin glargine  -- 10 unit(s) subcutaneous once a day (in the morning)  -- Indication: For Diabetes mellitus    insulin glargine  -- 15 unit(s) subcutaneous once a day (at bedtime)  -- Indication: For Diabetes mellitus    insulin lispro (concentrated) 200 units/mL subcutaneous solution  -- 3 unit(s) subcutaneous once a day MDD:once  a day before breakfast  -- Indication: For Diabetes mellitus    insulin lispro (concentrated) 200 units/mL subcutaneous solution  -- 3 unit(s) subcutaneous once a day MDD:once  a day before lunch  -- Indication: For Diabetes mellitus    insulin lispro (concentrated) 200 units/mL subcutaneous solution  -- 3 unit(s) subcutaneous once a day MDD:once  a day before dinner   -- Indication: For Diabetes mellitus    diphenhydrAMINE 50 mg/mL injectable solution  -- 50 milligram(s) injectable every 6 hours, As Needed  -- Indication: For Status post chemotherapy    Haldol Decanoate 50 mg/mL intramuscular solution  -- 1  injectable every 30 days  -- Indication: For Schizoaffective disorder, bipolar type    glucose 40% oral gel  --  by mouth   -- Indication: For Available for activation for if hypoglycemic    glucose 50% intravenous solution  -- 25 milliliter(s) intravenous once  -- Indication: For Available for activation for if hypoglycemic    glucose 50% intravenous solution  -- 50 milliliter(s) intravenous once  -- Indication: For Available for activation for if hypoglycemic    glucose 50% intravenous solution  -- 50 milliliter(s) intravenous once  -- Indication: For Available for activation for if hypoglycemic    Dextrose 5% in Water intravenous solution  -- 1000 milliliter(s) intravenous   -- Indication: For Available for activation for if Hypoglycemic    Dextrose 5% in Water intravenous solution  -- 1000 milliliter(s) intravenous   -- Indication: For Available for activation for if Hypoglycemic    sodium chloride 0.9% injectable solution  --  injectable   -- Indication: For PRN fluids inpatient

## 2018-09-12 DIAGNOSIS — E11.9 TYPE 2 DIABETES MELLITUS WITHOUT COMPLICATIONS: ICD-10-CM

## 2018-09-12 DIAGNOSIS — H16.009 UNSPECIFIED CORNEAL ULCER, UNSPECIFIED EYE: ICD-10-CM

## 2018-09-12 DIAGNOSIS — C18.9 MALIGNANT NEOPLASM OF COLON, UNSPECIFIED: ICD-10-CM

## 2018-09-12 LAB
CHOLEST SERPL-MCNC: 382 MG/DL — HIGH (ref 10–199)
GLUCOSE BLDC GLUCOMTR-MCNC: 266 MG/DL — HIGH (ref 70–99)
GLUCOSE BLDC GLUCOMTR-MCNC: 280 MG/DL — HIGH (ref 70–99)
GLUCOSE BLDC GLUCOMTR-MCNC: 399 MG/DL — HIGH (ref 70–99)
GLUCOSE BLDC GLUCOMTR-MCNC: 399 MG/DL — HIGH (ref 70–99)
HDLC SERPL-MCNC: 48 MG/DL — LOW
LIPID PNL WITH DIRECT LDL SERPL: 269 MG/DL — HIGH
TOTAL CHOLESTEROL/HDL RATIO MEASUREMENT: 8 RATIO — HIGH (ref 3.3–7.1)
TRIGL SERPL-MCNC: 325 MG/DL — HIGH (ref 10–149)

## 2018-09-12 PROCEDURE — 99223 1ST HOSP IP/OBS HIGH 75: CPT

## 2018-09-12 RX ORDER — INSULIN GLARGINE 100 [IU]/ML
10 INJECTION, SOLUTION SUBCUTANEOUS EVERY MORNING
Qty: 0 | Refills: 0 | Status: ACTIVE | OUTPATIENT
Start: 2018-09-12 | End: 2019-08-11

## 2018-09-12 RX ORDER — DIVALPROEX SODIUM 500 MG/1
500 TABLET, DELAYED RELEASE ORAL
Qty: 0 | Refills: 0 | Status: ACTIVE | OUTPATIENT
Start: 2018-09-12 | End: 2019-08-11

## 2018-09-12 RX ORDER — INSULIN LISPRO 100/ML
VIAL (ML) SUBCUTANEOUS
Qty: 0 | Refills: 0 | Status: DISCONTINUED | OUTPATIENT
Start: 2018-09-12 | End: 2018-09-13

## 2018-09-12 RX ORDER — INSULIN LISPRO 100/ML
VIAL (ML) SUBCUTANEOUS AT BEDTIME
Qty: 0 | Refills: 0 | Status: ACTIVE | OUTPATIENT
Start: 2018-09-12 | End: 2019-08-11

## 2018-09-12 RX ORDER — INSULIN LISPRO 100/ML
VIAL (ML) SUBCUTANEOUS AT BEDTIME
Qty: 0 | Refills: 0 | Status: DISCONTINUED | OUTPATIENT
Start: 2018-09-12 | End: 2018-09-13

## 2018-09-12 RX ORDER — LISINOPRIL 2.5 MG/1
5 TABLET ORAL DAILY
Qty: 0 | Refills: 0 | Status: ACTIVE | OUTPATIENT
Start: 2018-09-12 | End: 2019-08-11

## 2018-09-12 RX ORDER — HALOPERIDOL DECANOATE 100 MG/ML
5 INJECTION INTRAMUSCULAR EVERY 6 HOURS
Qty: 0 | Refills: 0 | Status: ACTIVE | OUTPATIENT
Start: 2018-09-12 | End: 2019-08-11

## 2018-09-12 RX ORDER — INSULIN GLARGINE 100 [IU]/ML
10 INJECTION, SOLUTION SUBCUTANEOUS EVERY MORNING
Qty: 0 | Refills: 0 | Status: DISCONTINUED | OUTPATIENT
Start: 2018-09-12 | End: 2018-09-13

## 2018-09-12 RX ORDER — INSULIN LISPRO 100/ML
15 VIAL (ML) SUBCUTANEOUS ONCE
Qty: 0 | Refills: 0 | Status: DISCONTINUED | OUTPATIENT
Start: 2018-09-12 | End: 2018-09-12

## 2018-09-12 RX ORDER — INSULIN GLARGINE 100 [IU]/ML
15 INJECTION, SOLUTION SUBCUTANEOUS AT BEDTIME
Qty: 0 | Refills: 0 | Status: ACTIVE | OUTPATIENT
Start: 2018-09-12 | End: 2019-08-11

## 2018-09-12 RX ORDER — INSULIN GLARGINE 100 [IU]/ML
20 INJECTION, SOLUTION SUBCUTANEOUS EVERY MORNING
Qty: 0 | Refills: 0 | Status: DISCONTINUED | OUTPATIENT
Start: 2018-09-12 | End: 2018-09-12

## 2018-09-12 RX ADMIN — LISINOPRIL 5 MILLIGRAM(S): 2.5 TABLET ORAL at 09:25

## 2018-09-12 RX ADMIN — INSULIN GLARGINE 10 UNIT(S): 100 INJECTION, SOLUTION SUBCUTANEOUS at 09:28

## 2018-09-12 RX ADMIN — Medication 5: at 17:33

## 2018-09-12 RX ADMIN — HEPARIN SODIUM 5000 UNIT(S): 5000 INJECTION INTRAVENOUS; SUBCUTANEOUS at 20:43

## 2018-09-12 RX ADMIN — DIVALPROEX SODIUM 500 MILLIGRAM(S): 500 TABLET, DELAYED RELEASE ORAL at 09:26

## 2018-09-12 RX ADMIN — HALOPERIDOL DECANOATE 2 MILLIGRAM(S): 100 INJECTION INTRAMUSCULAR at 20:44

## 2018-09-12 RX ADMIN — Medication 3 UNIT(S): at 12:19

## 2018-09-12 RX ADMIN — INSULIN GLARGINE 15 UNIT(S): 100 INJECTION, SOLUTION SUBCUTANEOUS at 20:43

## 2018-09-12 RX ADMIN — DIVALPROEX SODIUM 500 MILLIGRAM(S): 500 TABLET, DELAYED RELEASE ORAL at 20:44

## 2018-09-12 RX ADMIN — Medication 3 UNIT(S): at 17:33

## 2018-09-12 RX ADMIN — HALOPERIDOL DECANOATE 2 MILLIGRAM(S): 100 INJECTION INTRAMUSCULAR at 09:25

## 2018-09-12 RX ADMIN — Medication 3 UNIT(S): at 07:59

## 2018-09-12 RX ADMIN — Medication 1: at 20:42

## 2018-09-12 NOTE — PROGRESS NOTE BEHAVIORAL HEALTH - NSBHFUPINTERVALHXFT_PSY_A_CORE
Pt continues to be delusional, she believes that she is healthy and that she does not need any medication.  In that sense she is refusing her treatment.   She is pleasant and cooperative with interview, No agitated behavior. She answers all questions, Appetite is "good". Sleep is "good". Pt describes her mood as good, Denies any SI/HI, today denies AH , not observed to be responding to internal stimuli and talking to self during the interaction.   She is delusional as her baseline.  Noncompliant with her meds for the last year and presenting psychotic and hypomanic.   She is irregularly complaint with haldol dec.

## 2018-09-12 NOTE — PROGRESS NOTE BEHAVIORAL HEALTH - PROBLEM SELECTOR PLAN 1
with mets to liver  pt is s/p chemo 5yrs ago and radiation with mets to liver and lung pt is s/p chemo 5yrs ago and radiation

## 2018-09-12 NOTE — PROGRESS NOTE BEHAVIORAL HEALTH - NSBHFUPADDHPIFT_PSY_A_CORE
PCP: Dr. Corley  Endo: Dr. Baltazar Almendarez   Rad/onc: Dr. Rainey PCP: Dr. Corley  Endo: Dr. Baltazar Almendarez   [facility]  [facility] solid Tumor GI service   metastatic colon CA dxn in 2013s/p LAR and chemo  with limiting toxicityand now on local therqpies  Aug 2013  hemicolectomy   Sept 2015 cholecystectomy  March 2018 right Lung lesion had right lung ablation  last f/u with Dr Lewis Johnston  [facility] on May 2018  pt was maintained on haldol decanote 100mg IM Q4 weeks and depakote 750mg po hs

## 2018-09-12 NOTE — CONSULT NOTE ADULT - ASSESSMENT
53 y/o female with history of Schizoaffective disorder bipolar type, DM2, Stage 4 colon cancer with new mets to the liver s/p chemo 5yrs ago and radiation, corneal ulcer admitted for psychosis in the context of medical non-compliance.    # Schizoaffective disorder bipolar type  -Primary management per psych     # IDDM, uncontrolled 2/2 meds non-compliance   Last Hgb A1c = 13.9% (9/8/18)  -Continue with ISS  -Continue with Lantus 10 units am and 15 units qhs  -Continue with Humalog 3 units TIDAC  -Diabetic diet    # HTN  -Continue with Lisinopril 5 mg daily    # Colon cancer with mets to liver   -Patient seen by Dr. Schneider while on medical floor, however refused to see her.  -F/u with hem/onc outpatient 51 y/o female with history of Schizoaffective disorder bipolar type, DM2, Stage 4 colon cancer with new mets to the liver s/p chemo 5yrs ago and radiation, corneal ulcer admitted for psychosis in the context of medical non-compliance.    # Schizoaffective disorder bipolar type  -Primary management per psych     # IDDM, uncontrolled 2/2 meds non-compliance   Last Hgb A1c = 13.9% (9/8/18)  -Continue with ISS  -Continue with Lantus 10 units am and 15 units qhs  -Continue with Humalog 3 units TIDAC  -Diabetic diet    # HTN  -Continue with Lisinopril 5 mg daily    #Colon cancer with mets to liver   -Patient seen by Dr. Schneider while on medical floor, however refused to see her.  -F/u with hem/onc outpatient   -d/w outpatient provider Dr. Lewis Mejia regarding her treatment and prognosis, awaiting further medical charts regarding cancer.

## 2018-09-12 NOTE — PROGRESS NOTE BEHAVIORAL HEALTH - NSBHADDHXMEDFT_PSY_A_CORE
Stage 4 colon cancer with new mets to the liver s/p chemo 5yrs ago and radiation, Stage 4 colon cancer with  mets to the liver  and lung s/p chemo 5yrs ago and radiation,

## 2018-09-12 NOTE — PROGRESS NOTE BEHAVIORAL HEALTH - NSBHFUPSTRENGTHS_PSY_A_CORE
has supportive family/other
Has supportive interpersonal relationships with family, friends or peers/Has access to housing/residential stability

## 2018-09-12 NOTE — PROGRESS NOTE BEHAVIORAL HEALTH - SUMMARY
52 YOWW with schizoaffective disorder bipolar Type. with colon cancer 4th stage with newly discovered liver mets, presents to ER psychotic, manic , in the light of noncompliance and partial compliance. She gets haldol dec injection and last one was on 8/16/18, but she is not compliant with other meds.   PT is not suicidal or homicidal. But she si disorganized and cannot care for herself.   care coordinated withy ER attending. They are still completing medical workup and will admit pt to medicine. As per FSL pt presents the same way for at least 1 year and the only change in behavior is that she stopped attending her group psychotherapy sessions.       PSYCH plan is astrid keep pt on 1;1 and continue haldol dec. next one is due on 9/13/18.  Would do CT head and check ammonia level.     C&L will f/u

## 2018-09-12 NOTE — PROGRESS NOTE BEHAVIORAL HEALTH - NSBHADDHXPSYCHFT_PSY_A_CORE
As per , pt stopped taking all her medications and stopped following up with oncologist and endocrinologist because she believed she has been cured .  Pt able to verify that "I'm of perfect health so I don't know what you are talking about. I just signed myself in for a vacation, now I want to leave and go home"
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
## 2018-09-12 NOTE — PROGRESS NOTE BEHAVIORAL HEALTH - SUMMARY
52 YOWW with schizoaffective disorder bipolar Type. with colon cancer 4th stage with newly discovered liver mets, admitted via ER psychotic, initially presented manic and opsychotic due to noncompliance to meds.  She gets haldol dec injection and last one was on 8/16/18, but she is not compliant with other meds.   PT is not suicidal or homicidal. She is delusional, with no insight into her medical or psychiatric condition and  impaired judgment. Per FSL pt presents the same way for at least 1 year and the only change in behavior is that she stopped attending her group psychotherapy sessions.   Will lower depakote to 500mg PO BID because pt is noncompliant and in case that she agrees and takes her meds 1500mg can be too high dose.   Would increase haldol dec to 75 mg IM q 4 weeks, but pt skips the dose and 75 mg could be to high too. Continue Haldol dec 50mg IM q 4 weeks, next one due in 2 days.       C&L will f/u 52 YOWW with schizoaffective disorder bipolar Type. with colon cancer 4th stage with newly discovered liver mets, admitted via ER psychotic, initially presented manic and opsychotic due to noncompliance to meds.  She gets haldol dec injection and last one was on 8/16/18, but she is not compliant with other meds.   PT is not suicidal or homicidal. She is delusional, with no insight into her medical or psychiatric condition and  impaired judgment. Per FSL pt presents the same way for at least 1 year and the only change in behavior is that she stopped attending her group psychotherapy sessions.   Will lower depakote to 500mg PO BID because pt is noncompliant and in case that she agrees and takes her meds 1500mg can be too high dose.   Would increase haldol dec to 75 mg IM q 4 weeks, but pt skips the dose and 75 mg could be to high too. Continue Haldol dec 50mg IM q 4 weeks, next one due in 2 days.

## 2018-09-12 NOTE — PROGRESS NOTE BEHAVIORAL HEALTH - NSBHFUPINTERVALCCFT_PSY_A_CORE
"I signed myself in for a vacation and now you will sign me out now."  "there is nothing the matter with me, I'm healthy and not needing anything ."

## 2018-09-12 NOTE — PROGRESS NOTE BEHAVIORAL HEALTH - NSBHCONSULTFOLLOWDETAILS_PSY_A_CORE FT
Pt is psychotic. manic, no insight and poor judgement. She can not care for herself. Will transfer to 5 N, status 2PC.
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
## 2018-09-12 NOTE — PROGRESS NOTE BEHAVIORAL HEALTH - NSBHFUPINTERVALHXFT_PSY_A_CORE
Pt with noncompliance with medication and follow up with both psychiatric and medical providers   According to evaluation and verified by the pt that she believes she has no medical or mental disorder.

## 2018-09-12 NOTE — PROGRESS NOTE BEHAVIORAL HEALTH - NSBHFUPIPCHARTREVFT_PSY_A_CORE
Addendum, wrote off-service note 9/11/18 instead of inpatient on service note, please see 9/11/18 off-service note. Addendum, wrote off-service note 9/11/18 instead of inpatient on service note, please see 9/11/18 "off-service" note. Addendum, wrote off-service note 9/11/18 instead of inpatient "on-service note", please see 9/11/18 "off-service" note.

## 2018-09-12 NOTE — PROGRESS NOTE BEHAVIORAL HEALTH - NSBHCHARTREVIEWVS_PSY_A_CORE FT
Vital Signs Last 24 Hrs  T(C): 37 (09-11-18 @ 16:35), Max: 37 (09-11-18 @ 16:35)  T(F): 98.6 (09-11-18 @ 16:35), Max: 98.6 (09-11-18 @ 16:35)  HR: --  BP: --  BP(mean): --  RR: 18 (09-11-18 @ 16:35) (18 - 18)  SpO2: 100% (09-11-18 @ 16:35) (100% - 100%) Vital Signs Last 24 Hrs  T(C): 37 (09-11-18 @ 16:35), Max: 37 (09-11-18 @ 16:35)  T(F): 98.6 (09-11-18 @ 16:35), Max: 98.6 (09-11-18 @ 16:35)  RR: 18 (09-11-18 @ 16:35) (18 - 18)  SpO2: 100% (09-11-18 @ 16:35) (100% - 100%)  Orthostatic BP/HR: Sitting 134/85 HR 90 Standing 123/81 HR 98

## 2018-09-13 DIAGNOSIS — Z53.29 PROCEDURE AND TREATMENT NOT CARRIED OUT BECAUSE OF PATIENT'S DECISION FOR OTHER REASONS: ICD-10-CM

## 2018-09-13 DIAGNOSIS — F25.0 SCHIZOAFFECTIVE DISORDER, BIPOLAR TYPE: ICD-10-CM

## 2018-09-13 DIAGNOSIS — Z91.19 PATIENT'S NONCOMPLIANCE WITH OTHER MEDICAL TREATMENT AND REGIMEN: ICD-10-CM

## 2018-09-13 LAB
ANION GAP SERPL CALC-SCNC: 10 MMOL/L — SIGNIFICANT CHANGE UP (ref 5–17)
APTT BLD: 26.1 SEC — LOW (ref 27.5–37.4)
BUN SERPL-MCNC: 9 MG/DL — SIGNIFICANT CHANGE UP (ref 7–23)
CALCIUM SERPL-MCNC: 9.7 MG/DL — SIGNIFICANT CHANGE UP (ref 8.5–10.1)
CHLORIDE SERPL-SCNC: 101 MMOL/L — SIGNIFICANT CHANGE UP (ref 96–108)
CO2 SERPL-SCNC: 25 MMOL/L — SIGNIFICANT CHANGE UP (ref 22–31)
CREAT SERPL-MCNC: 0.76 MG/DL — SIGNIFICANT CHANGE UP (ref 0.5–1.3)
GLUCOSE BLDC GLUCOMTR-MCNC: 298 MG/DL — HIGH (ref 70–99)
GLUCOSE BLDC GLUCOMTR-MCNC: 394 MG/DL — HIGH (ref 70–99)
GLUCOSE SERPL-MCNC: 394 MG/DL — HIGH (ref 70–99)
HCT VFR BLD CALC: 40.6 % — SIGNIFICANT CHANGE UP (ref 34.5–45)
HGB BLD-MCNC: 14.1 G/DL — SIGNIFICANT CHANGE UP (ref 11.5–15.5)
INR BLD: 1.01 RATIO — SIGNIFICANT CHANGE UP (ref 0.88–1.16)
MCHC RBC-ENTMCNC: 30.1 PG — SIGNIFICANT CHANGE UP (ref 27–34)
MCHC RBC-ENTMCNC: 34.7 GM/DL — SIGNIFICANT CHANGE UP (ref 32–36)
MCV RBC AUTO: 86.8 FL — SIGNIFICANT CHANGE UP (ref 80–100)
NRBC # BLD: 0 /100 WBCS — SIGNIFICANT CHANGE UP (ref 0–0)
PLATELET # BLD AUTO: 230 K/UL — SIGNIFICANT CHANGE UP (ref 150–400)
POTASSIUM SERPL-MCNC: 3.9 MMOL/L — SIGNIFICANT CHANGE UP (ref 3.5–5.3)
POTASSIUM SERPL-SCNC: 3.9 MMOL/L — SIGNIFICANT CHANGE UP (ref 3.5–5.3)
PROTHROM AB SERPL-ACNC: 10.9 SEC — SIGNIFICANT CHANGE UP (ref 9.8–12.7)
RBC # BLD: 4.68 M/UL — SIGNIFICANT CHANGE UP (ref 3.8–5.2)
RBC # FLD: 13.2 % — SIGNIFICANT CHANGE UP (ref 10.3–14.5)
SODIUM SERPL-SCNC: 136 MMOL/L — SIGNIFICANT CHANGE UP (ref 135–145)
WBC # BLD: 5.1 K/UL — SIGNIFICANT CHANGE UP (ref 3.8–10.5)
WBC # FLD AUTO: 5.1 K/UL — SIGNIFICANT CHANGE UP (ref 3.8–10.5)

## 2018-09-13 PROCEDURE — 99232 SBSQ HOSP IP/OBS MODERATE 35: CPT

## 2018-09-13 RX ORDER — ENOXAPARIN SODIUM 100 MG/ML
40 INJECTION SUBCUTANEOUS DAILY
Qty: 0 | Refills: 0 | Status: DISCONTINUED | OUTPATIENT
Start: 2018-09-13 | End: 2018-10-09

## 2018-09-13 RX ORDER — HALOPERIDOL DECANOATE 100 MG/ML
4 INJECTION INTRAMUSCULAR
Qty: 0 | Refills: 0 | Status: DISCONTINUED | OUTPATIENT
Start: 2018-09-13 | End: 2018-09-14

## 2018-09-13 RX ORDER — INSULIN LISPRO 100/ML
VIAL (ML) SUBCUTANEOUS AT BEDTIME
Qty: 0 | Refills: 0 | Status: DISCONTINUED | OUTPATIENT
Start: 2018-09-13 | End: 2018-10-09

## 2018-09-13 RX ORDER — HALOPERIDOL DECANOATE 100 MG/ML
100 INJECTION INTRAMUSCULAR
Qty: 0 | Refills: 0 | Status: DISCONTINUED | OUTPATIENT
Start: 2018-09-13 | End: 2018-09-26

## 2018-09-13 RX ORDER — INSULIN GLARGINE 100 [IU]/ML
15 INJECTION, SOLUTION SUBCUTANEOUS AT BEDTIME
Qty: 0 | Refills: 0 | Status: DISCONTINUED | OUTPATIENT
Start: 2018-09-13 | End: 2018-09-13

## 2018-09-13 RX ORDER — INSULIN LISPRO 100/ML
VIAL (ML) SUBCUTANEOUS
Qty: 0 | Refills: 0 | Status: DISCONTINUED | OUTPATIENT
Start: 2018-09-13 | End: 2018-10-09

## 2018-09-13 RX ORDER — HALOPERIDOL DECANOATE 100 MG/ML
5 INJECTION INTRAMUSCULAR ONCE
Qty: 0 | Refills: 0 | Status: DISCONTINUED | OUTPATIENT
Start: 2018-09-13 | End: 2018-10-09

## 2018-09-13 RX ORDER — INSULIN LISPRO 100/ML
VIAL (ML) SUBCUTANEOUS
Qty: 0 | Refills: 0 | Status: DISCONTINUED | OUTPATIENT
Start: 2018-09-13 | End: 2018-09-13

## 2018-09-13 RX ORDER — INSULIN GLARGINE 100 [IU]/ML
30 INJECTION, SOLUTION SUBCUTANEOUS AT BEDTIME
Qty: 0 | Refills: 0 | Status: DISCONTINUED | OUTPATIENT
Start: 2018-09-13 | End: 2018-09-13

## 2018-09-13 RX ORDER — INSULIN GLARGINE 100 [IU]/ML
30 INJECTION, SOLUTION SUBCUTANEOUS AT BEDTIME
Qty: 0 | Refills: 0 | Status: DISCONTINUED | OUTPATIENT
Start: 2018-09-14 | End: 2018-09-18

## 2018-09-13 RX ADMIN — INSULIN GLARGINE 10 UNIT(S): 100 INJECTION, SOLUTION SUBCUTANEOUS at 08:03

## 2018-09-13 RX ADMIN — DIVALPROEX SODIUM 500 MILLIGRAM(S): 500 TABLET, DELAYED RELEASE ORAL at 09:02

## 2018-09-13 RX ADMIN — ENOXAPARIN SODIUM 40 MILLIGRAM(S): 100 INJECTION SUBCUTANEOUS at 15:13

## 2018-09-13 RX ADMIN — Medication 6: at 12:18

## 2018-09-13 RX ADMIN — Medication 3 UNIT(S): at 08:03

## 2018-09-13 RX ADMIN — Medication 3 UNIT(S): at 12:18

## 2018-09-13 RX ADMIN — Medication 5: at 08:02

## 2018-09-13 RX ADMIN — HALOPERIDOL DECANOATE 2 MILLIGRAM(S): 100 INJECTION INTRAMUSCULAR at 09:02

## 2018-09-13 RX ADMIN — LISINOPRIL 5 MILLIGRAM(S): 2.5 TABLET ORAL at 09:02

## 2018-09-13 NOTE — CONSULT NOTE ADULT - ASSESSMENT
53 y/o female with history of Schizoaffective disorder bipolar type, DM2, Stage 4 colon cancer with new mets to the liver s/p chemo 5yrs ago and radiation, corneal ulcer admitted for psychosis in the context of medical non-compliance.    # Schizoaffective disorder bipolar type  -Continue primary management per psych     # IDDM, uncontrolled 2/2 meds non-compliance   Last Hgb A1c = 13.9% (9/8/18)  -Continue with ISS  -Switched to Lantus 30U at bedtime  - Discontinued Lantus 10U QAM and 15U QPM  -Continue with Humalog 3 units TIDAC  -Diabetic diet    # HTN  -Continue with Lisinopril 5 mg daily    #Colon cancer with mets to liver   -Patient seen by Dr. Schneider while on medical floor, however refused to see her.  -F/u with heme/onc outpatient   -d/w outpatient provider Dr. Lewis Mejia regarding her treatment and prognosis, awaiting further medical charts regarding cancer.

## 2018-09-13 NOTE — PROGRESS NOTE BEHAVIORAL HEALTH - NSBHCHARTREVIEWVS_PSY_A_CORE FT
ICU Vital Signs Last 24 Hrs  T(C): 36.9 (13 Sep 2018 07:17), Max: 36.9 (13 Sep 2018 07:17)  T(F): 98.5 (13 Sep 2018 07:17), Max: 98.5 (13 Sep 2018 07:17)  HR: 96 (13 Sep 2018 07:17) (96 - 96)  BP: 126/76 (13 Sep 2018 07:17) (126/76 - 126/76)  BP(mean): --  ABP: --  ABP(mean): --  RR: 16 (13 Sep 2018 07:17) (16 - 16)  SpO2: 100% (13 Sep 2018 07:17) (100% - 100%)

## 2018-09-13 NOTE — CONSULT NOTE ADULT - SUBJECTIVE AND OBJECTIVE BOX
HPI:  53 y/o female with history of Schizoaffective disorder bipolar type, DM2, Stage 4 colon cancer with new mets to the liver s/p chemo 5yrs ago and radiation, corneal ulcer was brought to the ED by law enforcement with the complaints of erratic behaviour, hostility and aggressiveness towards . As per , pt stopped taking all her medications and stopped following up with oncologist and endocrinologist because she believed she has been cured and has no medical issues. In the ED, Pt was noted to have hyperglycemia and was medically managed on medical unit before she was transferred to inpatient psychiatry for psychosis.     SUBJECTIVE:   9/13/2018:  Pt was seen on 5N. Pt still denies having any medical condition when asked about her h/o cancer. Pt also states that she is doing very good, and that she is waiting for her father to pick her up today. Per medication administration record, and also per pt's nurse, pt did take all her medications the day prior.     Another attempt was made today to obtain medical record regarding pt's cancer and previous treatments from [facility] (Dr. Mejia) but I was unable to reach anyone. I had initially contacted the office and spoke with the  who reported that she will pass on the message to Dr. Mejia and fax the documents to [facility]. No fax material has been sent yet.    REVIEW OF SYSTEMS:  Pt reports feeling "very good" and without any symptoms      Vital Signs Last 24 Hrs  T(C): 36.9 (13 Sep 2018 07:17), Max: 36.9 (13 Sep 2018 07:17)  T(F): 98.5 (13 Sep 2018 07:17), Max: 98.5 (13 Sep 2018 07:17)  HR: 96 (13 Sep 2018 07:17) (96 - 96)  BP: 126/76 (13 Sep 2018 07:17) (126/76 - 126/76)  BP(mean): --  RR: 16 (13 Sep 2018 07:17) (16 - 16)  SpO2: 100% (13 Sep 2018 07:17) (100% - 100%)      CAPILLARY BLOOD GLUCOSE      POCT Blood Glucose.: 298 mg/dL (13 Sep 2018 12:10)  POCT Blood Glucose.: 394 mg/dL (13 Sep 2018 07:22)  POCT Blood Glucose.: 280 mg/dL (12 Sep 2018 20:19)      PHYSICAL EXAM:  Was unable to perform physical exam as pt reports feeling fine and refused to be examined.      MEDICATIONS:  MEDICATIONS  (STANDING):  dextrose 50% Injectable 12.5 Gram(s) IV Push once  dextrose 50% Injectable 25 Gram(s) IV Push once  dextrose 50% Injectable 25 Gram(s) IV Push once  diVALproex  milliGRAM(s) Oral two times a day  enoxaparin Injectable 40 milliGRAM(s) SubCutaneous daily  haloperidol decanoate Injectable, Long Acting 100 milliGRAM(s) IntraMuscular every 4 weeks  insulin glargine Injectable (LANTUS) 15 Unit(s) SubCutaneous at bedtime  insulin lispro (HumaLOG) corrective regimen sliding scale   SubCutaneous three times a day before meals  insulin lispro (HumaLOG) corrective regimen sliding scale   SubCutaneous at bedtime  insulin lispro Injectable (HumaLOG) 3 Unit(s) SubCutaneous before breakfast  insulin lispro Injectable (HumaLOG) 3 Unit(s) SubCutaneous before lunch  insulin lispro Injectable (HumaLOG) 3 Unit(s) SubCutaneous before dinner  lisinopril 5 milliGRAM(s) Oral daily      LABS: All Labs Reviewed:                        14.1   5.10  )-----------( 230      ( 13 Sep 2018 13:07 )             40.6     09-13    136  |  101  |  9   ----------------------------<  394<H>  3.9   |  25  |  0.76    Ca    9.7      13 Sep 2018 13:07      PT/INR - ( 13 Sep 2018 13:07 )   PT: 10.9 sec;   INR: 1.01 ratio         PTT - ( 13 Sep 2018 13:07 )  PTT:26.1 sec

## 2018-09-13 NOTE — PROGRESS NOTE BEHAVIORAL HEALTH - NSBHFUPINTERVALHXFT_PSY_A_CORE
Pt refusing to take her haldol decanoate 100mg IM that is due today.  Pt with intense labile inappropriate affect.  Pt taunting staff by making pretend "guns" with her hands and shooting at staff in childish manner.  Pt still demanding to leave after claiming that her "father is coming to get me out of here."  Pt also refusing the depakote. Pt continue with the thought  that she has no mental illness and to a large degree that she does not have any medical problem also. Pt refusing to take her haldol decanoate 100mg IM that is due today.  Pt with intense labile inappropriate affect.  Pt taunting staff by making pretend "guns" with her hands and shooting at staff in childish manner.  Pt still demanding to leave after claiming that her "father is coming to get me out of here."  Pt also refusing the depakote. Pt continue with the thought  that she has no mental illness and to a large degree that she does not have any medical problem also.    Reviewed the chart and Dr Nunez (internist) [phone] and or Dr Car [phone] may be following this case through family practice residents?  Nursing indicated that the residents were discussing about possible family meeting and palliative care??
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
## 2018-09-13 NOTE — PROGRESS NOTE BEHAVIORAL HEALTH - SUMMARY
52 YOWW with schizoaffective disorder bipolar Type. with colon cancer 4th stage with newly discovered liver mets, presents to ER psychotic, manic , in the light of noncompliance and partial compliance. She gets haldol dec injection and last one was on 8/16/18, but she is not compliant with other meds.   PT is not suicidal or homicidal. But she is disorganized and cannot care for herself.      Pt is delusional and denies medical and psychiatric diagnosis and is refusing all meds, however   Pt agreeing to take Haldol Dec shot per staff.  Pt believes she will be discharged today to fathers home.  Seen by Palliative care today for follow up tx plan meeting.

## 2018-09-13 NOTE — PROGRESS NOTE BEHAVIORAL HEALTH - NSBHFUPINTERVALCCFT_PSY_A_CORE
"Let me out I'm done with the vacation" "Let me out I'm done with the vacation" "I wouldn't think about leaving the building if I were you, the police and the FBI have surrounded the building and you know what is going to happen to you " as she mimics a gun with her hand , pointing and firing her imaginary gun at me ."

## 2018-09-13 NOTE — PROGRESS NOTE BEHAVIORAL HEALTH - NSBHFUPINTERVALCCFT_PSY_A_CORE
"I am fine.  There is nothing wrong with me. I just needed to go to a spa but it wasn't covered.  I just needed to meditate and music therapy.  But I am ready to go home now.  I don't have diabetes and I don't need meds, I feel great."

## 2018-09-13 NOTE — PROGRESS NOTE BEHAVIORAL HEALTH - SUMMARY
52 YOWW with schizoaffective disorder bipolar Type. with colon cancer 4th stage with newly discovered liver mets, presents to ER psychotic, manic , in the light of noncompliance and partial compliance. She gets haldol dec injection and last one was on 8/16/18, but she is not compliant with other meds.   PT is not suicidal or homicidal. But she si disorganized and cannot care for herself.   care coordinated withy ER attending. They are still completing medical workup and will admit pt to medicine. As per FSL pt presents the same way for at least 1 year and the only change in behavior is that she stopped attending her group psychotherapy sessions.       [facility] plan is astrid keep pt on 1;1 and continue haldol dec. next one is due on 9/13/18.  Would do CT head and check ammonia level.     C&L will f/u

## 2018-09-14 DIAGNOSIS — Z91.14 PATIENT'S OTHER NONCOMPLIANCE WITH MEDICATION REGIMEN: ICD-10-CM

## 2018-09-14 DIAGNOSIS — E11.65 TYPE 2 DIABETES MELLITUS WITH HYPERGLYCEMIA: ICD-10-CM

## 2018-09-14 DIAGNOSIS — C78.7 SECONDARY MALIGNANT NEOPLASM OF LIVER AND INTRAHEPATIC BILE DUCT: ICD-10-CM

## 2018-09-14 DIAGNOSIS — Z79.899 OTHER LONG TERM (CURRENT) DRUG THERAPY: ICD-10-CM

## 2018-09-14 DIAGNOSIS — E11.10 TYPE 2 DIABETES MELLITUS WITH KETOACIDOSIS WITHOUT COMA: ICD-10-CM

## 2018-09-14 DIAGNOSIS — R74.0 NONSPECIFIC ELEVATION OF LEVELS OF TRANSAMINASE AND LACTIC ACID DEHYDROGENASE [LDH]: ICD-10-CM

## 2018-09-14 DIAGNOSIS — F25.0 SCHIZOAFFECTIVE DISORDER, BIPOLAR TYPE: ICD-10-CM

## 2018-09-14 DIAGNOSIS — C18.9 MALIGNANT NEOPLASM OF COLON, UNSPECIFIED: ICD-10-CM

## 2018-09-14 LAB — GLUCOSE BLDC GLUCOMTR-MCNC: 340 MG/DL — HIGH (ref 70–99)

## 2018-09-14 PROCEDURE — 99232 SBSQ HOSP IP/OBS MODERATE 35: CPT

## 2018-09-14 RX ORDER — HALOPERIDOL DECANOATE 100 MG/ML
10 INJECTION INTRAMUSCULAR DAILY
Qty: 0 | Refills: 0 | Status: DISCONTINUED | OUTPATIENT
Start: 2018-09-14 | End: 2018-10-09

## 2018-09-14 RX ORDER — BENZTROPINE MESYLATE 1 MG
1 TABLET ORAL
Qty: 0 | Refills: 0 | Status: DISCONTINUED | OUTPATIENT
Start: 2018-09-14 | End: 2018-10-09

## 2018-09-14 RX ORDER — BENZTROPINE MESYLATE 1 MG
1 TABLET ORAL EVERY 4 HOURS
Qty: 0 | Refills: 0 | Status: DISCONTINUED | OUTPATIENT
Start: 2018-09-14 | End: 2018-10-09

## 2018-09-14 RX ORDER — BENZTROPINE MESYLATE 1 MG
1 TABLET ORAL EVERY 6 HOURS
Qty: 0 | Refills: 0 | Status: DISCONTINUED | OUTPATIENT
Start: 2018-09-14 | End: 2018-10-09

## 2018-09-14 RX ORDER — ATORVASTATIN CALCIUM 80 MG/1
40 TABLET, FILM COATED ORAL AT BEDTIME
Qty: 0 | Refills: 0 | Status: DISCONTINUED | OUTPATIENT
Start: 2018-09-14 | End: 2018-10-09

## 2018-09-14 RX ORDER — DIVALPROEX SODIUM 500 MG/1
750 TABLET, DELAYED RELEASE ORAL DAILY
Qty: 0 | Refills: 0 | Status: DISCONTINUED | OUTPATIENT
Start: 2018-09-14 | End: 2018-09-26

## 2018-09-14 RX ADMIN — Medication 8: at 11:57

## 2018-09-14 RX ADMIN — Medication 3 UNIT(S): at 11:58

## 2018-09-14 RX ADMIN — HALOPERIDOL DECANOATE 10 MILLIGRAM(S): 100 INJECTION INTRAMUSCULAR at 11:57

## 2018-09-14 RX ADMIN — DIVALPROEX SODIUM 750 MILLIGRAM(S): 500 TABLET, DELAYED RELEASE ORAL at 11:57

## 2018-09-14 NOTE — PROGRESS NOTE BEHAVIORAL HEALTH - NSBHFUPINTERVALHXFT_PSY_A_CORE
Pt refusing to take her haldol decanoate 100mg IM that is due today.  Pt with intense labile inappropriate affect.  Pt taunting staff by making pretend "guns" with her hands and shooting at staff in childish manner.  Pt still demanding to leave after claiming that her "father is coming to get me out of here."  Pt also refusing the depakote. Pt continue with the thought  that she has no mental illness and to a large degree that she does not have any medical problem also.    Reviewed the chart and Dr Nunez (internist) [phone] and or Dr Car [phone] may be following this case through family practice residents?  Nursing indicated that the residents were discussing about possible family meeting and palliative care??

## 2018-09-14 NOTE — CONSULT NOTE ADULT - ASSESSMENT
51 y/o female with history of Schizoaffective disorder bipolar type, DM2, Stage 4 colon cancer with new mets to the liver s/p chemo 5yrs ago and radiation, corneal ulcer admitted for psychosis in the context of medical non-compliance.    # Schizoaffective disorder bipolar type  -Continue primary management per psych     # IDDM, uncontrolled 2/2 meds non-compliance   Last Hgb A1c = 13.9% (9/8/18)  -Continue with ISS  -Switched to Lantus 30U at bedtime  - Discontinued Lantus 10U QAM and 15U QPM  -Continue with Humalog 3 units TIDAC  -Diabetic diet    # HTN  -Continue with Lisinopril 5 mg daily    #Colon cancer with mets to liver   -Patient seen by Dr. Schneider while on medical floor, however refused to see her.  -F/u with heme/onc outpatient   -d/w outpatient provider Dr. Lewis Mejia regarding her treatment and prognosis, awaiting further medical charts regarding cancer.

## 2018-09-14 NOTE — PROGRESS NOTE BEHAVIORAL HEALTH - SUMMARY
52 YOWW with schizoaffective disorder bipolar Type. with colon cancer 4th stage with newly discovered liver mets, presents to ER psychotic, manic , in the light of noncompliance and partial compliance. She gets haldol dec injection and last one was on 8/16/18, but she is not compliant with other meds.   PT is not suicidal or homicidal. But she si disorganized and cannot care for herself.   care coordinated withy ER attending. They are still completing medical workup and will admit pt to medicine. As per FSL pt presents the same way for at least 1 year and the only change in behavior is that she stopped attending her group psychotherapy sessions.       [facility] plan is astrid keep pt on 1;1 and continue haldol dec. next one is due on 9/13/18.  Would do CT head and check ammonia level.     C&L will f/u

## 2018-09-14 NOTE — CONSULT NOTE ADULT - SUBJECTIVE AND OBJECTIVE BOX
HPI:  51 y/o female with history of Schizoaffective disorder bipolar type, DM2, Stage 4 colon cancer with new mets to the liver s/p chemo 5yrs ago and radiation, corneal ulcer was brought to the ED by law enforcement with the complaints of erratic behaviour, hostility and aggressiveness towards . As per , pt stopped taking all her medications and stopped following up with oncologist and endocrinologist because she believed she has been cured and has no medical issues. In the ED, Pt was noted to have hyperglycemia and was medically managed on medical unit before she was transferred to inpatient psychiatry for psychosis.     SUBJECTIVE:   9/13/2018:  Pt was seen on 5N. Patient is without complaints, and would like to go home. Patient is still not consistently adherent to insulin and BGM's, but the importance of medications has been stressed, patient does not feel she needs medications. Case was discussed with patient  privately. The details of her medical condition were shared, patient  asked questions regarding prognosis and further steps, all questions answered. Have not been able to get [place] [facility]  has them and is very knowledgeable as to her medical status, however patient denies still having cancer and is not willing to go for treatment. Patient  admits that patient's latest acute psychosis started after Haloperidol does was lowered.    REVIEW OF SYSTEMS:  As stated above, patient denies any symptoms.    Vital Signs Last 24 Hrs  T(C): 36.8 (14 Sep 2018 07:50), Max: 36.8 (14 Sep 2018 07:50)  T(F): 98.3 (14 Sep 2018 07:50), Max: 98.3 (14 Sep 2018 07:50)  HR: 106 (14 Sep 2018 07:50) (106 - 106)  BP: 122/75 (14 Sep 2018 07:50) (122/75 - 122/75)  BP(mean): --  RR: 16 (14 Sep 2018 07:50) (16 - 16)  SpO2: 99% (14 Sep 2018 07:50) (99% - 99%)      CAPILLARY BLOOD GLUCOSE    POCT Blood Glucose.: 340 mg/dL (09.14.18 @ 11:52)  POCT Blood Glucose.: 298 mg/dL (13 Sep 2018 12:10)  POCT Blood Glucose.: 394 mg/dL (13 Sep 2018 07:22)  POCT Blood Glucose.: 280 mg/dL (12 Sep 2018 20:19)      PHYSICAL EXAM:  Was unable to perform physical exam as pt reports feeling fine and refused to be examined.      MEDICATIONS  (STANDING):  benztropine 1 milliGRAM(s) Oral two times a day  dextrose 50% Injectable 12.5 Gram(s) IV Push once  dextrose 50% Injectable 25 Gram(s) IV Push once  dextrose 50% Injectable 25 Gram(s) IV Push once  diVALproex  milliGRAM(s) Oral daily  enoxaparin Injectable 40 milliGRAM(s) SubCutaneous daily  haloperidol     Tablet 10 milliGRAM(s) Oral daily  haloperidol decanoate Injectable, Long Acting 100 milliGRAM(s) IntraMuscular every 4 weeks  insulin glargine Injectable (LANTUS) 30 Unit(s) SubCutaneous at bedtime  insulin lispro (HumaLOG) corrective regimen sliding scale   SubCutaneous at bedtime  insulin lispro (HumaLOG) corrective regimen sliding scale   SubCutaneous three times a day before meals  insulin lispro Injectable (HumaLOG) 3 Unit(s) SubCutaneous before breakfast  insulin lispro Injectable (HumaLOG) 3 Unit(s) SubCutaneous before lunch  insulin lispro Injectable (HumaLOG) 3 Unit(s) SubCutaneous before dinner  lisinopril 5 milliGRAM(s) Oral daily    MEDICATIONS  (PRN):  benztropine 1 milliGRAM(s) Oral every 4 hours PRN Extrapyramidal symptoms  benztropine Injectable 1 milliGRAM(s) IntraMuscular every 6 hours PRN Extrapyramidal symptoms  dextrose 40% Gel 15 Gram(s) Oral once PRN Blood Glucose LESS THAN 70 milliGRAM(s)/deciliter  diphenhydrAMINE   Injectable 50 milliGRAM(s) IntraMuscular every 6 hours PRN Agitation  glucagon  Injectable 1 milliGRAM(s) IntraMuscular once PRN Glucose LESS THAN 70 milligrams/deciliter  haloperidol    Injectable 5 milliGRAM(s) IntraMuscular once PRN aggression related to bipolar disorder  haloperidol    Injectable 5 milliGRAM(s) IntraMuscular every 6 hours PRN Agitation  LORazepam   Injectable 2 milliGRAM(s) IntraMuscular every 6 hours PRN Agitation        LABS: All Labs Reviewed:                                     14.1   5.10  )-----------( 230      ( 13 Sep 2018 13:07 )             40.6   09-13    136  |  101  |  9   ----------------------------<  394<H>  3.9   |  25  |  0.76    Ca    9.7      13 Sep 2018 13:07

## 2018-09-14 NOTE — PROGRESS NOTE BEHAVIORAL HEALTH - NSBHFUPINTERVALCCFT_PSY_A_CORE
"Let me out I'm done with the vacation" "I wouldn't think about leaving the building if I were you, the police and the FBI have surrounded the building and you know what is going to happen to you " as she mimics a gun with her hand , pointing and firing her imaginary gun at me ."

## 2018-09-15 LAB
ALBUMIN SERPL ELPH-MCNC: 3 G/DL — LOW (ref 3.3–5)
ALP SERPL-CCNC: 299 U/L — HIGH (ref 40–120)
ALT FLD-CCNC: 81 U/L — HIGH (ref 12–78)
ANION GAP SERPL CALC-SCNC: 10 MMOL/L — SIGNIFICANT CHANGE UP (ref 5–17)
APTT BLD: 26.7 SEC — LOW (ref 27.5–37.4)
AST SERPL-CCNC: 42 U/L — HIGH (ref 15–37)
BILIRUB DIRECT SERPL-MCNC: 0.1 MG/DL — SIGNIFICANT CHANGE UP (ref 0–0.2)
BILIRUB INDIRECT FLD-MCNC: 0.3 MG/DL — SIGNIFICANT CHANGE UP (ref 0.2–1)
BILIRUB SERPL-MCNC: 0.4 MG/DL — SIGNIFICANT CHANGE UP (ref 0.2–1.2)
BUN SERPL-MCNC: 12 MG/DL — SIGNIFICANT CHANGE UP (ref 7–23)
CALCIUM SERPL-MCNC: 9.1 MG/DL — SIGNIFICANT CHANGE UP (ref 8.5–10.1)
CHLORIDE SERPL-SCNC: 106 MMOL/L — SIGNIFICANT CHANGE UP (ref 96–108)
CO2 SERPL-SCNC: 26 MMOL/L — SIGNIFICANT CHANGE UP (ref 22–31)
CREAT SERPL-MCNC: 0.58 MG/DL — SIGNIFICANT CHANGE UP (ref 0.5–1.3)
GLUCOSE BLDC GLUCOMTR-MCNC: 246 MG/DL — HIGH (ref 70–99)
GLUCOSE BLDC GLUCOMTR-MCNC: 295 MG/DL — HIGH (ref 70–99)
GLUCOSE BLDC GLUCOMTR-MCNC: 321 MG/DL — HIGH (ref 70–99)
GLUCOSE BLDC GLUCOMTR-MCNC: 335 MG/DL — HIGH (ref 70–99)
GLUCOSE SERPL-MCNC: 257 MG/DL — HIGH (ref 70–99)
HCT VFR BLD CALC: 36.8 % — SIGNIFICANT CHANGE UP (ref 34.5–45)
HGB BLD-MCNC: 12.7 G/DL — SIGNIFICANT CHANGE UP (ref 11.5–15.5)
INR BLD: 1.02 RATIO — SIGNIFICANT CHANGE UP (ref 0.88–1.16)
MCHC RBC-ENTMCNC: 30.7 PG — SIGNIFICANT CHANGE UP (ref 27–34)
MCHC RBC-ENTMCNC: 34.5 GM/DL — SIGNIFICANT CHANGE UP (ref 32–36)
MCV RBC AUTO: 88.9 FL — SIGNIFICANT CHANGE UP (ref 80–100)
NRBC # BLD: 0 /100 WBCS — SIGNIFICANT CHANGE UP (ref 0–0)
PLATELET # BLD AUTO: 179 K/UL — SIGNIFICANT CHANGE UP (ref 150–400)
POTASSIUM SERPL-MCNC: 3.7 MMOL/L — SIGNIFICANT CHANGE UP (ref 3.5–5.3)
POTASSIUM SERPL-SCNC: 3.7 MMOL/L — SIGNIFICANT CHANGE UP (ref 3.5–5.3)
PROT SERPL-MCNC: 7.2 GM/DL — SIGNIFICANT CHANGE UP (ref 6–8.3)
PROTHROM AB SERPL-ACNC: 11 SEC — SIGNIFICANT CHANGE UP (ref 9.8–12.7)
RBC # BLD: 4.14 M/UL — SIGNIFICANT CHANGE UP (ref 3.8–5.2)
RBC # FLD: 13.6 % — SIGNIFICANT CHANGE UP (ref 10.3–14.5)
SODIUM SERPL-SCNC: 142 MMOL/L — SIGNIFICANT CHANGE UP (ref 135–145)
WBC # BLD: 3.59 K/UL — LOW (ref 3.8–10.5)
WBC # FLD AUTO: 3.59 K/UL — LOW (ref 3.8–10.5)

## 2018-09-15 RX ADMIN — Medication 3 UNIT(S): at 08:42

## 2018-09-15 RX ADMIN — Medication 3 UNIT(S): at 12:01

## 2018-09-15 RX ADMIN — Medication 4: at 21:14

## 2018-09-15 RX ADMIN — Medication 4: at 08:42

## 2018-09-15 RX ADMIN — Medication 1 MILLIGRAM(S): at 08:41

## 2018-09-15 RX ADMIN — Medication 6: at 12:00

## 2018-09-15 RX ADMIN — DIVALPROEX SODIUM 750 MILLIGRAM(S): 500 TABLET, DELAYED RELEASE ORAL at 08:41

## 2018-09-15 RX ADMIN — Medication 1 MILLIGRAM(S): at 21:14

## 2018-09-15 RX ADMIN — Medication 3 UNIT(S): at 17:15

## 2018-09-15 RX ADMIN — HALOPERIDOL DECANOATE 10 MILLIGRAM(S): 100 INJECTION INTRAMUSCULAR at 08:41

## 2018-09-15 RX ADMIN — ATORVASTATIN CALCIUM 40 MILLIGRAM(S): 80 TABLET, FILM COATED ORAL at 21:14

## 2018-09-15 RX ADMIN — Medication 8: at 17:15

## 2018-09-15 NOTE — CONSULT NOTE ADULT - SUBJECTIVE AND OBJECTIVE BOX
HPI:  51 y/o female with history of Schizoaffective disorder bipolar type, DM2, Stage 4 colon cancer with new mets to the liver s/p chemo 5yrs ago and radiation, corneal ulcer was brought to the ED by law enforcement with the complaints of erratic behaviour, hostility and aggressiveness towards . As per , pt stopped taking all her medications and stopped following up with oncologist and endocrinologist because she believed she has been cured and has no medical issues. In the ED, Pt was noted to have hyperglycemia and was medically managed on medical unit before she was transferred to inpatient psychiatry for psychosis.     SUBJECTIVE:   9/15/2018:  Patient seen on 5N. Patient is agreeable to getting her AM labs done and her regular BGM's taken, as well as her insulin. This is an improvement in her mental lucidity since yesterday and days previous. Case d/w nurse, that if patient is not agreeable to lantus QHS, try to give it in the AM, as Lantus the basal insulin will help control blood glucose optimally, however not to give more than one dose of 30u Lantus over a 24hour period. Provider to RN order placed in this regard.    REVIEW OF SYSTEMS:  As stated above, patient denies any symptoms.    Vital Signs Last 24 Hrs  T(C): --  T(F): --  HR: --  BP: --  BP(mean): --  RR: --  SpO2: --      CAPILLARY BLOOD GLUCOSE    POCT Blood Glucose.: 295 mg/dL (09.15.18 @ 11:33)  POCT Blood Glucose.: 246 mg/dL (09.15.18 @ 07:56)  POCT Blood Glucose.: 340 mg/dL (09.14.18 @ 11:52)  POCT Blood Glucose.: 298 mg/dL (13 Sep 2018 12:10)  POCT Blood Glucose.: 394 mg/dL (13 Sep 2018 07:22)  POCT Blood Glucose.: 280 mg/dL (12 Sep 2018 20:19)      PHYSICAL EXAM:  Was unable to perform physical exam as pt reports feeling fine and refused to be examined.      MEDICATIONS  (STANDING):  atorvastatin 40 milliGRAM(s) Oral at bedtime  benztropine 1 milliGRAM(s) Oral two times a day  dextrose 50% Injectable 12.5 Gram(s) IV Push once  dextrose 50% Injectable 25 Gram(s) IV Push once  dextrose 50% Injectable 25 Gram(s) IV Push once  diVALproex  milliGRAM(s) Oral daily  enoxaparin Injectable 40 milliGRAM(s) SubCutaneous daily  haloperidol     Tablet 10 milliGRAM(s) Oral daily  haloperidol decanoate Injectable, Long Acting 100 milliGRAM(s) IntraMuscular every 4 weeks  insulin glargine Injectable (LANTUS) 30 Unit(s) SubCutaneous at bedtime  insulin lispro (HumaLOG) corrective regimen sliding scale   SubCutaneous at bedtime  insulin lispro (HumaLOG) corrective regimen sliding scale   SubCutaneous three times a day before meals  insulin lispro Injectable (HumaLOG) 3 Unit(s) SubCutaneous before breakfast  insulin lispro Injectable (HumaLOG) 3 Unit(s) SubCutaneous before lunch  insulin lispro Injectable (HumaLOG) 3 Unit(s) SubCutaneous before dinner  lisinopril 5 milliGRAM(s) Oral daily    MEDICATIONS  (PRN):  benztropine 1 milliGRAM(s) Oral every 4 hours PRN Extrapyramidal symptoms  benztropine Injectable 1 milliGRAM(s) IntraMuscular every 6 hours PRN Extrapyramidal symptoms  dextrose 40% Gel 15 Gram(s) Oral once PRN Blood Glucose LESS THAN 70 milliGRAM(s)/deciliter  diphenhydrAMINE   Injectable 50 milliGRAM(s) IntraMuscular every 6 hours PRN Agitation  glucagon  Injectable 1 milliGRAM(s) IntraMuscular once PRN Glucose LESS THAN 70 milligrams/deciliter  haloperidol    Injectable 5 milliGRAM(s) IntraMuscular once PRN aggression related to bipolar disorder  haloperidol    Injectable 5 milliGRAM(s) IntraMuscular every 6 hours PRN Agitation  LORazepam   Injectable 2 milliGRAM(s) IntraMuscular every 6 hours PRN Agitation      LABS: All Labs Reviewed:                                     12.7   3.59  )-----------( 179      ( 15 Sep 2018 07:41 )             36.8   09-15    142  |  106  |  12  ----------------------------<  257<H>  3.7   |  26  |  0.58    Ca    9.1      15 Sep 2018 07:41    TPro  7.2  /  Alb  3.0<L>  /  TBili  0.4  /  DBili  0.1  /  AST  42<H>  /  ALT  81<H>  /  AlkPhos  299<H>  09-15

## 2018-09-15 NOTE — PROGRESS NOTE BEHAVIORAL HEALTH - NSBHFUPINTERVALHXFT_PSY_A_CORE
Ms. Hegarty cooperated with this assessment, denying acute distress, complaint, or concern.  In light of her partial medication non-adherence yesterday, she was encouraged to be adherent today, providing psychoeducation about the clinical indications for these medication recommendations.  On exploration of her insight she again revealed thought process disorganization.  She was provided supportive psychotherapy and encouraged to discuss any concerns with the staff.  She denied current  ideations to harm self or others, as well as hallucinations, or specific paranoid ideation at this time. No behavioral disturbances were noted overnight.

## 2018-09-15 NOTE — CONSULT NOTE ADULT - ASSESSMENT
51 y/o female with history of Schizoaffective disorder bipolar type, DM2, Stage 4 colon cancer with new mets to the liver s/p chemo 5yrs ago and radiation, corneal ulcer admitted for psychosis in the context of medical non-compliance.    # Schizoaffective disorder bipolar type  -Continue primary management per psych     # IDDM, uncontrolled 2/2 meds non-compliance   Last Hgb A1c = 13.9% (9/8/18)  -Continue with ISS  -Switched to Lantus 30U at bedtime, but may give in AM if patient is more agreeable, not to be given twice over a 24hr period  -Continue with Humalog 3 units TIDAC  -Diabetic diet    # HTN  -Continue with Lisinopril 5 mg daily    #Colon cancer with mets to liver   -Patient seen by Dr. Schneider while on medical floor, however refused to see her.  -F/u with heme/onc outpatient   -d/w outpatient provider Dr. Lewis Mejia regarding her treatment and prognosis, awaiting further medical charts regarding cancer.

## 2018-09-16 LAB
GLUCOSE BLDC GLUCOMTR-MCNC: 277 MG/DL — HIGH (ref 70–99)
GLUCOSE BLDC GLUCOMTR-MCNC: 300 MG/DL — HIGH (ref 70–99)
GLUCOSE BLDC GLUCOMTR-MCNC: 371 MG/DL — HIGH (ref 70–99)

## 2018-09-16 RX ADMIN — INSULIN GLARGINE 30 UNIT(S): 100 INJECTION, SOLUTION SUBCUTANEOUS at 12:07

## 2018-09-16 RX ADMIN — Medication 6: at 12:05

## 2018-09-16 RX ADMIN — HALOPERIDOL DECANOATE 10 MILLIGRAM(S): 100 INJECTION INTRAMUSCULAR at 15:28

## 2018-09-16 RX ADMIN — Medication 1 MILLIGRAM(S): at 15:27

## 2018-09-16 RX ADMIN — DIVALPROEX SODIUM 750 MILLIGRAM(S): 500 TABLET, DELAYED RELEASE ORAL at 15:27

## 2018-09-16 RX ADMIN — Medication 10: at 17:26

## 2018-09-16 NOTE — CONSULT NOTE ADULT - SUBJECTIVE AND OBJECTIVE BOX
HPI:  51 y/o female with history of Schizoaffective disorder bipolar type, DM2, Stage 4 colon cancer with new mets to the liver s/p chemo 5yrs ago and radiation, corneal ulcer was brought to the ED by law enforcement with the complaints of erratic behaviour, hostility and aggressiveness towards . As per , pt stopped taking all her medications and stopped following up with oncologist and endocrinologist because she believed she has been cured and has no medical issues. In the ED, Pt was noted to have hyperglycemia and was medically managed on medical unit before she was transferred to inpatient psychiatry for psychosis.     SUBJECTIVE:   9/16/2018:  Per pt's nurse, pt continues to intermittently refuse Lantus dose. Pt was taking all meds on previous day but refused Lantus on previous night and also this morning. Pt was seen at bedside, who reported that she is sleeping and therefore cannot speak to us. Pt however stated that she is doing fine when asked.    REVIEW OF SYSTEMS:  Pt reports that she is doing fine. She denies any other symptoms    Vital Signs Last 24 Hrs  T(C): 36.9 (16 Sep 2018 07:33), Max: 36.9 (16 Sep 2018 07:33)  T(F): 98.4 (16 Sep 2018 07:33), Max: 98.4 (16 Sep 2018 07:33)  HR: 86 (16 Sep 2018 07:33) (86 - 86)  BP: 111/72 (16 Sep 2018 07:33) (111/72 - 111/72)  RR: 16 (16 Sep 2018 07:33) (16 - 16)  SpO2: 100% (16 Sep 2018 07:33) (100% - 100%)    I&O's Summary      CAPILLARY BLOOD GLUCOSE      POCT Blood Glucose.: 371 mg/dL (16 Sep 2018 17:08)  POCT Blood Glucose.: 277 mg/dL (16 Sep 2018 12:03)  POCT Blood Glucose.: 300 mg/dL (16 Sep 2018 08:03)  POCT Blood Glucose.: 321 mg/dL (15 Sep 2018 20:11)      PHYSICAL EXAM:  Pt refuses examination    MEDICATIONS:  MEDICATIONS  (STANDING):  atorvastatin 40 milliGRAM(s) Oral at bedtime  benztropine 1 milliGRAM(s) Oral two times a day  dextrose 50% Injectable 12.5 Gram(s) IV Push once  dextrose 50% Injectable 25 Gram(s) IV Push once  dextrose 50% Injectable 25 Gram(s) IV Push once  diVALproex  milliGRAM(s) Oral daily  enoxaparin Injectable 40 milliGRAM(s) SubCutaneous daily  haloperidol     Tablet 10 milliGRAM(s) Oral daily  haloperidol decanoate Injectable, Long Acting 100 milliGRAM(s) IntraMuscular every 4 weeks  insulin glargine Injectable (LANTUS) 30 Unit(s) SubCutaneous at bedtime  insulin lispro (HumaLOG) corrective regimen sliding scale   SubCutaneous at bedtime  insulin lispro (HumaLOG) corrective regimen sliding scale   SubCutaneous three times a day before meals  lisinopril 5 milliGRAM(s) Oral daily      LABS: All Labs Reviewed:                        12.7   3.59  )-----------( 179      ( 15 Sep 2018 07:41 )             36.8     09-15    142  |  106  |  12  ----------------------------<  257<H>  3.7   |  26  |  0.58    Ca    9.1      15 Sep 2018 07:41    TPro  7.2  /  Alb  3.0<L>  /  TBili  0.4  /  DBili  0.1  /  AST  42<H>  /  ALT  81<H>  /  AlkPhos  299<H>  09-15    PT/INR - ( 15 Sep 2018 07:41 )   PT: 11.0 sec;   INR: 1.02 ratio         PTT - ( 15 Sep 2018 07:41 )  PTT:26.7 sec      Blood Culture:     RADIOLOGY/EKG:    DVT PPX:    ADVANCED DIRECTIVE:    DISPOSITION:

## 2018-09-16 NOTE — CONSULT NOTE ADULT - ASSESSMENT
51 y/o female with history of Schizoaffective disorder bipolar type, DM2, Stage 4 colon cancer with new mets to the liver s/p chemo 5yrs ago and radiation, corneal ulcer admitted for psychosis in the context of medical non-compliance.    # Schizoaffective disorder bipolar type  -Continue primary management per psych     # IDDM, uncontrolled 2/2 meds non-compliance   Last Hgb A1c = 13.9% (9/8/18)  -Continue with ISS  -Switched to Lantus 30U at bedtime, but may give in AM if patient is more agreeable, not to be given twice over a 24hr period  -Humalog 3 units TIDAC discontinued  - Continue on Diabetic diet    # HTN  -Continue with Lisinopril 5 mg daily    #Colon cancer with mets to liver   -Patient seen by Dr. Schneider while on medical floor, however refused to see her.  -F/u with heme/onc outpatient   -d/w outpatient provider Dr. Lewis Mejia regarding her treatment and prognosis, awaiting further medical charts regarding cancer. Will recontact in the AM to obtain medical records again

## 2018-09-17 PROCEDURE — 99232 SBSQ HOSP IP/OBS MODERATE 35: CPT

## 2018-09-17 RX ADMIN — Medication 1 MILLIGRAM(S): at 10:19

## 2018-09-17 RX ADMIN — HALOPERIDOL DECANOATE 10 MILLIGRAM(S): 100 INJECTION INTRAMUSCULAR at 10:20

## 2018-09-17 RX ADMIN — DIVALPROEX SODIUM 750 MILLIGRAM(S): 500 TABLET, DELAYED RELEASE ORAL at 10:19

## 2018-09-17 NOTE — CONSULT NOTE ADULT - ASSESSMENT
53 y/o female with history of Schizoaffective disorder bipolar type, DM2, Stage 4 colon cancer with new mets to the liver s/p chemo 5yrs ago and radiation, corneal ulcer admitted for psychosis in the context of medical non-compliance, with noted hyperglycemia.    # Schizoaffective disorder bipolar type  -Continue primary management per psych     # IDDM, uncontrolled 2/2 meds non-compliance   - Pt intermittently refuses medication and BG check  Last Hgb A1c = 13.9% (9/8/18)  -Continue with ISS  -Switched to Lantus 30U at bedtime, but may give in AM if patient is more agreeable, not to be given twice over a 24hr period  -Humalog 3 units TIDAC discontinued  - Continue on Diabetic diet    # HTN  -Continue with Lisinopril 5 mg daily    #Colon cancer with mets to liver   -Patient seen by Dr. Schneider while on medical floor, however refused to see her.  -F/u with heme/onc outpatient   -d/w outpatient provider Dr. Lewis Mejia regarding her treatment and prognosis, still yet to receive medical documentations.

## 2018-09-17 NOTE — CONSULT NOTE ADULT - SUBJECTIVE AND OBJECTIVE BOX
HPI:  HPI:  53 y/o female with history of Schizoaffective disorder bipolar type, DM2, Stage 4 colon cancer with new mets to the liver s/p chemo 5yrs ago and radiation, corneal ulcer was brought to the ED by law enforcement with the complaints of erratic behaviour, hostility and aggressiveness towards . As per , pt stopped taking all her medications and stopped following up with oncologist and endocrinologist because she believed she has been cured and has no medical issues. In the ED, Pt was noted to have hyperglycemia and was medically managed on medical unit before she was transferred to inpatient psychiatry for psychosis.     SUBJECTIVE:   9/17/2018:  No change in pt's reply to questions. Pt continues to respond that she is "fine, thank you", and denies any symptoms or haivng any medical conditions. Per nurse, pt continued to intermittently refuse medications and checking of blood glucose. Pt refused all lab work this morning, per nurse.    REVIEW OF SYSTEMS:  CONSTITUTIONAL: Denies fevers or chills  EYES/ENT: Denies visual changes  NECK: Denies pain or stiffness  RESPIRATORY: Denies cough, or shortness of breath  CARDIOVASCULAR: Denies chest pain or palpitations  GASTROINTESTINAL: Denies abdominal pain, nausea or vomiting.  GENITOURINARY: Denies dysuria  NEUROLOGICAL: Denies headache or dizziness  SKIN: Denies any rash  All other review of systems is negative unless indicated above    Vital Signs Last 24 Hrs  T(C): --  T(F): --  HR: --  BP: --  BP(mean): --  RR: --  SpO2: --    I&O's Summary      CAPILLARY BLOOD GLUCOSE        PHYSICAL EXAM:  Pt refuses physical examination    MEDICATIONS:  MEDICATIONS  (STANDING):  atorvastatin 40 milliGRAM(s) Oral at bedtime  benztropine 1 milliGRAM(s) Oral two times a day  dextrose 50% Injectable 12.5 Gram(s) IV Push once  dextrose 50% Injectable 25 Gram(s) IV Push once  dextrose 50% Injectable 25 Gram(s) IV Push once  diVALproex  milliGRAM(s) Oral daily  enoxaparin Injectable 40 milliGRAM(s) SubCutaneous daily  haloperidol     Tablet 10 milliGRAM(s) Oral daily  haloperidol decanoate Injectable, Long Acting 100 milliGRAM(s) IntraMuscular every 4 weeks  insulin glargine Injectable (LANTUS) 30 Unit(s) SubCutaneous at bedtime  insulin lispro (HumaLOG) corrective regimen sliding scale   SubCutaneous at bedtime  insulin lispro (HumaLOG) corrective regimen sliding scale   SubCutaneous three times a day before meals  lisinopril 5 milliGRAM(s) Oral daily      LABS: All Labs Reviewed:

## 2018-09-17 NOTE — PROGRESS NOTE BEHAVIORAL HEALTH - NSBHFUPINTERVALHXFT_PSY_A_CORE
Pt with a little less agitation as the pt was atleast not yelling at me today and was able to be redirected.  Pt still with guardedness and is minimizing symptoms.   Deniies any suicidal ideation plan.  Pt took her medication when her  was present to help her remain compliance.

## 2018-09-18 RX ORDER — INSULIN GLARGINE 100 [IU]/ML
30 INJECTION, SOLUTION SUBCUTANEOUS AT BEDTIME
Qty: 0 | Refills: 0 | Status: DISCONTINUED | OUTPATIENT
Start: 2018-09-18 | End: 2018-10-09

## 2018-09-18 NOTE — PROGRESS NOTE BEHAVIORAL HEALTH - NSBHFUPINTERVALHXFT_PSY_A_CORE
Pt is irritable and agitated. I checked with the nurse and pt has refused bgm's and refused all of her  medical and pychiatiic medications.  Pt is accusatory "well the nurse lied cause I took all of the medication ,. You  need to leave now I don't like talking to you".  Pt appearing disshelved , hair unbrushed, wearing hospital gown . She is isolative , guarded and supious.  Pt claiming that she is with no medical or psychiatric illness,  she is not even allowing the fingersticks for blood glucose monitoring . Pt is irritable and agitated. I checked with the nurse and pt has refused bgm's and refused all of her  medical and pychiatiic medications.  Pt is accusatory "well the nurse lied cause I took all of the medication ,. You  need to leave now I don't like talking to you".  Pt appearing disshelved , hair unbrushed, wearing hospital gown . She is isolative , guarded and suspious.  Pt claiming that she is with no medical or psychiatric illness, she is refusing  all medications today and she is not even allowing the fingersticks for blood glucose monitoring .  In the recent past she would be reluctant to take medications from nursing but would with a great deal of support would take medication while her  was present, even while insisting that he is actually an ex .  Today she refused to take any medications at all.   Will attempt to get labs Pt is irritable and agitated. I checked with the nurse and pt has refused bgm's and refused all of her  medical and pychiatiic medications.  Pt is accusatory "well the nurse lied cause I took all of the medication ,. You  need to leave now I don't like talking to you".  Pt appearing disshelved , hair unbrushed, wearing hospital gown . She is isolative , guarded and suspious.  Pt claiming that she is with no medical or psychiatric illness, she is refusing  all medications today and she is not even allowing the fingersticks for blood glucose monitoring .  In the recent past she would be reluctant to take medications from nursing but would with a great deal of support would take medication while her  was present, even while insisting that he is actually an ex .  Today she refused to take any medications at all.   Will attempt to get labs to get updated glucose level .   Will continue to try to encourage the pt for compliance.

## 2018-09-18 NOTE — PROGRESS NOTE ADULT - SUBJECTIVE AND OBJECTIVE BOX
Pt has been seen and examined with FP resident, resident supervised agree with a/p       Patient is a 52y old  Female currently being managed in psych unit for her psych disorder, medical consult for medical management     PHYSICAL EXAM:  Vital Signs Last 24 Hrs  T(C): 36.9 (18 Sep 2018 09:22), Max: 36.9 (18 Sep 2018 09:22)  T(F): 98.5 (18 Sep 2018 09:22), Max: 98.5 (18 Sep 2018 09:22)  HR: 79 (18 Sep 2018 09:22) (79 - 79)  BP: 110/73 (18 Sep 2018 09:22) (110/73 - 110/73)  BP(mean): --  RR: 14 (18 Sep 2018 09:22) (14 - 14)  SpO2: 100% (18 Sep 2018 09:22) (100% - 100%)  general- comfortable   -[id number]-[id number],cta  -cvs-s1s2 normal       A/P    #Pt is not not taking her insuline, advised and discussed with her to take her insuline in order to control blood sugar     #ct to monitor

## 2018-09-18 NOTE — PROGRESS NOTE BEHAVIORAL HEALTH - DETAILS
stage 4 colon cancer with mets to the liver DM stage 4 colon cancer with mets to the liver and lung  as recorded by information from [facility]

## 2018-09-18 NOTE — CONSULT NOTE ADULT - SUBJECTIVE AND OBJECTIVE BOX
CHIEF COMPLAINT: hyperglycemia    SUBJECTIVE:   9/18/2018:  Pt's status unchanged from previous day, still refusing insulin medication and physical exams, denies any issues or symptoms.    REVIEW OF SYSTEMS:  CONSTITUTIONAL: No weakness, fevers or chills  EYES/ENT: No visual changes;  No vertigo or throat pain   NECK: No pain or stiffness  RESPIRATORY: No cough, wheezing, hemoptysis; No shortness of breath  CARDIOVASCULAR: No chest pain or palpitations  GASTROINTESTINAL: No abdominal or epigastric pain. No nausea, vomiting, or hematemesis; No diarrhea or constipation. No melena or hematochezia.  GENITOURINARY: No dysuria, frequency or hematuria  NEUROLOGICAL: No numbness or weakness  SKIN: No itching, burning, rashes, or lesions   All other review of systems is negative unless indicated above    Vital Signs Last 24 Hrs  T(C): 36.9 (18 Sep 2018 09:22), Max: 36.9 (18 Sep 2018 09:22)  T(F): 98.5 (18 Sep 2018 09:22), Max: 98.5 (18 Sep 2018 09:22)  HR: 79 (18 Sep 2018 09:22) (79 - 79)  BP: 110/73 (18 Sep 2018 09:22) (110/73 - 110/73)  RR: 14 (18 Sep 2018 09:22) (14 - 14)  SpO2: 100% (18 Sep 2018 09:22) (100% - 100%)      PHYSICAL EXAM:  No physical exam performed, pt refuses examination      MEDICATIONS:  MEDICATIONS  (STANDING):  atorvastatin 40 milliGRAM(s) Oral at bedtime  benztropine 1 milliGRAM(s) Oral two times a day  dextrose 50% Injectable 12.5 Gram(s) IV Push once  dextrose 50% Injectable 25 Gram(s) IV Push once  dextrose 50% Injectable 25 Gram(s) IV Push once  diVALproex  milliGRAM(s) Oral daily  enoxaparin Injectable 40 milliGRAM(s) SubCutaneous daily  haloperidol     Tablet 10 milliGRAM(s) Oral daily  haloperidol decanoate Injectable, Long Acting 100 milliGRAM(s) IntraMuscular every 4 weeks  insulin glargine Injectable (LANTUS) 30 Unit(s) SubCutaneous at bedtime  insulin lispro (HumaLOG) corrective regimen sliding scale   SubCutaneous at bedtime  insulin lispro (HumaLOG) corrective regimen sliding scale   SubCutaneous three times a day before meals  lisinopril 5 milliGRAM(s) Oral daily

## 2018-09-18 NOTE — PROGRESS NOTE BEHAVIORAL HEALTH - NSBHFUPSUICINTERVALFT_PSY_A_CORE
Pt denies any suicidal ideation but is refusing all medication and monitoring of vitals and fingersticks.

## 2018-09-18 NOTE — PROGRESS NOTE BEHAVIORAL HEALTH - NSBHCHARTREVIEWVS_PSY_A_CORE FT
Vital Signs Last 24 Hrs  T(C): 36.9 (18 Sep 2018 09:22), Max: 36.9 (18 Sep 2018 09:22)  T(F): 98.5 (18 Sep 2018 09:22), Max: 98.5 (18 Sep 2018 09:22)  HR: 79 (18 Sep 2018 09:22) (79 - 79)  BP: 110/73 (18 Sep 2018 09:22) (110/73 - 110/73)  BP(mean): --  RR: 14 (18 Sep 2018 09:22) (14 - 14)  SpO2: 100% (18 Sep 2018 09:22) (100% - 100%)

## 2018-09-18 NOTE — CONSULT NOTE ADULT - ASSESSMENT
51 y/o female with history of Schizoaffective disorder bipolar type, DM2, Stage 4 colon cancer with new mets to the liver s/p chemo 5yrs ago and radiation, corneal ulcer admitted for psychosis in the context of medical non-compliance, with noted hyperglycemia.      # IDDM, uncontrolled 2/2 meds non-compliance   - Pt intermittently refuses medication and BG check  Last Hgb A1c = 13.9% (9/8/18)  -Continue with ISS  -Continue Lantus 30U daily, can be given at any time of the day if pt is more agreeable, but not to be given twice over a 24hr period  - Continue on Diabetic diet    # Schizoaffective disorder bipolar type  -Continue primary management per psych     # HTN  -Continue with Lisinopril 5 mg daily    #Colon cancer with mets to liver   -Patient seen by Dr. Schneider while on medical floor, however refused to see her.  -F/u with heme/onc outpatient   -d/w outpatient provider Dr. Lewis Mejia regarding her treatment and prognosis, still yet to receive medical documentations.

## 2018-09-19 NOTE — PROGRESS NOTE BEHAVIORAL HEALTH - NSBHFUPINTERVALHXFT_PSY_A_CORE
Pt appearing disheveled, hair is unbrushed, gown is soiled with some food stains. She appears  preoccupied with her own thoughts , had stopped walking down the hallway and was now standing in the middle of the [place] ignoring the people walking by her.  Asked her if there was anything that would help in her compliance with her medical medications and the allowing monitoring of her blood work as she has diabetes and hyperlipidemia . "I am fine. I don't need any medications if I am fine. "  Asked her if she preferred to wait until her  came to take her medications . " He is not my   and you are to stop saying that he is. You are to discharge me now . I don't belong here ."

## 2018-09-19 NOTE — PROGRESS NOTE BEHAVIORAL HEALTH - NSBHCHARTREVIEWVS_PSY_A_CORE FT
Vital Signs Last 24 Hrs  T(C): 37.1 (19 Sep 2018 09:05), Max: 37.1 (19 Sep 2018 09:05)  T(F): 98.7 (19 Sep 2018 09:05), Max: 98.7 (19 Sep 2018 09:05)  HR: 86 (19 Sep 2018 09:05) (86 - 86)  BP: 131/86 (19 Sep 2018 09:05) (131/86 - 131/86)  BP(mean): --  RR: 14 (19 Sep 2018 09:05) (14 - 14)  SpO2: 98% (19 Sep 2018 09:05) (98% - 98%)

## 2018-09-19 NOTE — CONSULT NOTE ADULT - ASSESSMENT
53 y/o female with history of Schizoaffective disorder bipolar type, DM2, Stage 4 colon cancer with new mets to the liver s/p chemo 5yrs ago and radiation, corneal ulcer admitted for psychosis in the context of medical non-compliance, with noted hyperglycemia.      # IDDM, uncontrolled 2/2 meds non-compliance   - Pt continues to refuse medication and BG check  Last Hgb A1c = 13.9% (9/8/18)  -Recommend continuing with ISS when pt is cooperative  -Recommend continuing Lantus 30U daily when pt is agreeable to receive medication. Can be given at any time of the day, but not to be given twice over a 24hr period  - Continue on Diabetic diet  - No further interventions required from Medicine standpoint. Encourage continued BG monitoring and insulin administration as pt will allow.   Will be signing off case today. This has been discussed with [facility] staff and rest of Medicine team. Recontact medicine team for any further issues    # Schizoaffective disorder bipolar type  -Continue primary management per psych     # HTN  -Continue with Lisinopril 5 mg daily    #Colon cancer with mets to liver   -Patient seen by Dr. Schneider while on medical floor, however refused to see her.  -F/u with heme/onc outpatient   -d/w outpatient provider Dr. Lewis Mejia with Medical records sent to hospital. Document is in pt's paper chart

## 2018-09-19 NOTE — PROGRESS NOTE BEHAVIORAL HEALTH - NSBHFUPINTERVALHXFT_PSY_A_CORE
Pt walking away, turning  away when I try to approach her today. Pt avoiding any attempt to have a conversation,.  Tried to address with her concerns that she is not only refusing all of her medications but also since 9/16 on she has refused all blood work.  Tried to talk with her about comcerns of her blood glucose level which in the recent past has run as high as 399 and of the concern that if she continues with refusal of adequate monitoring of the sugar level and hre receiving when needed insulin coverage that she runs the risk of further metabolic changes. . Pt however refused to respond , was staring out the window, with a fixed smile  , appearing preoccupied with response to internal stimuli.   Nursing indicated that earlier in the day she was told of the concern of her elevated blood sugars and difficulty in monitoring how she is doing and she was reported to say that it was nothing new for her to have  high blood sugars.    It is believed that her delusions are preventing her from acknowledging and allowing treatment of  her medical and psychiatric conditions . Therefore the treatment team has decided to begin the process of bringing the pt to court for medication over objections.

## 2018-09-19 NOTE — CONSULT NOTE ADULT - SUBJECTIVE AND OBJECTIVE BOX
CHIEF COMPLAINT: Hyperglycemia    SUBJECTIVE:   9/19:  I spoke with pt's nurse this AM who reported that pt has continued to refuse all blood work and medications. Last BG check done was on 9/16 and elevated at 371. Pt was seen at bedside, who reported that she is sleeping and does not want to be bothered. Pt however continues to say that she is fine. When asked about taking her medications, she reports that she has been taking them. Per nurse and med rec, pt has not been receiving  medication.      REVIEW OF SYSTEMS:  Pt states that "everything is fine"    Vital Signs Last 24 Hrs  T(C): 37.1 (19 Sep 2018 09:05), Max: 37.1 (19 Sep 2018 09:05)  T(F): 98.7 (19 Sep 2018 09:05), Max: 98.7 (19 Sep 2018 09:05)  HR: 86 (19 Sep 2018 09:05) (86 - 86)  BP: 131/86 (19 Sep 2018 09:05) (131/86 - 131/86)  RR: 14 (19 Sep 2018 09:05) (14 - 14)  SpO2: 98% (19 Sep 2018 09:05) (98% - 98%)      PHYSICAL EXAM:  Pt is uncooperative, refuses physical exam. States that she is sleeping and that she is fine and does not need to be examined    MEDICATIONS:  MEDICATIONS  (STANDING):  atorvastatin 40 milliGRAM(s) Oral at bedtime  benztropine 1 milliGRAM(s) Oral two times a day  dextrose 50% Injectable 12.5 Gram(s) IV Push once  dextrose 50% Injectable 25 Gram(s) IV Push once  dextrose 50% Injectable 25 Gram(s) IV Push once  diVALproex  milliGRAM(s) Oral daily  enoxaparin Injectable 40 milliGRAM(s) SubCutaneous daily  haloperidol     Tablet 10 milliGRAM(s) Oral daily  haloperidol decanoate Injectable, Long Acting 100 milliGRAM(s) IntraMuscular every 4 weeks  insulin glargine Injectable (LANTUS) 30 Unit(s) SubCutaneous at bedtime  insulin lispro (HumaLOG) corrective regimen sliding scale   SubCutaneous at bedtime  insulin lispro (HumaLOG) corrective regimen sliding scale   SubCutaneous three times a day before meals  lisinopril 5 milliGRAM(s) Oral daily

## 2018-09-19 NOTE — CONSULT NOTE ADULT - ATTENDING COMMENTS
Patient seen and examined with Family Medicine Residents Drs. Edward Kayserian and Yadira Lam on the Family Medicine Teaching Service.  Agree with history, physical, labs and plan which were reviewed in detail after a face to face encounter with the patient.
Patient seen and examined with Family Medicine Residents Vic Gauthier and Jaquelin Weaver on the Family Medicine Teaching Service.  Agree with history, physical, labs and plan which were reviewed in detail after a face to face encounter with the patient.
Patient seen and examined with Family Medicine Residents Vic Gauthier, Edward Kayserian, Jaquelin Weaver and Yadira Lam on the Family Medicine Teaching Service.  Agree with history, physical, labs and plan which were reviewed in detail after a face to face encounter with the patient.
Patient seen and examined with Family Medicine Residents Vic Gauthier, Edward Kayserian, Jaquelin Weaver, Yadira Lam and Medical Student Kellen on the Family Medicine Teaching Service.  Agree with history, physical, labs and plan which were reviewed in detail after a face to face encounter with the patient.
on exam- comfortable   -cvs-s1s2 normal     #control psychosis and if possible and if pt agrees to take then continue same regimen o insuline     #will sign off the case, if you need help please call us. Thank you
Patient seen and examined with Family Medicine Residents Vic Gauthier, Edward Kayserian, Jaquelin Weaver, Yadira Lam and Medical Student Kellen on the Family Medicine Teaching Service.  Agree with history, physical, labs and plan which were reviewed in detail after a face to face encounter with the patient.

## 2018-09-19 NOTE — PROGRESS NOTE BEHAVIORAL HEALTH - OTHER
mostly not saying much, or being silent silent appearing preoccupied and angry denies AH but reported past (Pt states never had AH)

## 2018-09-19 NOTE — PROGRESS NOTE BEHAVIORAL HEALTH - SUMMARY
52 YOWW with schizoaffective disorder bipolar Type. with colon cancer 4th stage with newly discovered liver mets, presents to ER psychotic, manic , in the light of noncompliance and partial compliance. She gets haldol dec injection and last one was on 8/16/18, but she is not compliant with other meds.   PT is not suicidal or homicidal. But she si disorganized and cannot care for herself.   care coordinated withy ER attending. They are still completing medical workup and will admit pt to medicine. As per [facility] pt presents the same way for at least 1 year and the only change in behavior is that she stopped attending her group psychotherapy sessions.       [facility] plan is astrid keep pt on 1;1 and continue haldol dec. next one is due on 9/13/18.  Would do CT head and check ammonia level.     C&L will f/u

## 2018-09-19 NOTE — PROGRESS NOTE BEHAVIORAL HEALTH - DETAILS
stage 4 colon cancer with mets to the liver and lung  as recorded by information from [facility] DM

## 2018-09-19 NOTE — PROGRESS NOTE BEHAVIORAL HEALTH - DETAILS
stage 4 colon cancer with mets to the liver and lung  as recorded by information from [facility] DM

## 2018-09-20 NOTE — PROGRESS NOTE BEHAVIORAL HEALTH - DETAILS
stage 4 colon cancer with mets to the liver and lung  as recorded by information from [facility] DM

## 2018-09-20 NOTE — PROGRESS NOTE BEHAVIORAL HEALTH - NSBHCHARTREVIEWVS_PSY_A_CORE FT
Vital Signs Last 24 Hrs  T(C): 36.6 (20 Sep 2018 08:55), Max: 36.6 (20 Sep 2018 08:55)  T(F): 97.8 (20 Sep 2018 08:55), Max: 97.8 (20 Sep 2018 08:55)  HR: 85 (20 Sep 2018 08:55) (85 - 85)  BP: 109/74 (20 Sep 2018 08:55) (109/74 - 109/74)  BP(mean): --  RR: 14 (20 Sep 2018 08:55) (14 - 14)  SpO2: 99% (20 Sep 2018 08:55) (99% - 99%)

## 2018-09-20 NOTE — PROGRESS NOTE BEHAVIORAL HEALTH - OTHER
denies AH but reported past (Pt states never had AH) mostly not saying much, or being silent silent appearing preoccupied and angry

## 2018-09-20 NOTE — PROGRESS NOTE BEHAVIORAL HEALTH - NSBHFUPINTERVALHXFT_PSY_A_CORE
Pt still refusing to allow any meaningful conversation as she continues to walk away mumbling and sometimes smiling to herself , If I continue to follow the pt at times she  would spontaneously get irritable and demand to be discharged immediately as she believes "I have nothing wrong with me I don't need to be here.".  Pt has not allowed any monitoring of her blood sugars which usually run very high 300's, not allowing blood work and refusing all medical and psychiatric medications.  Will be at this time continuing with plan to go for court , medication over objections as it is reported from the  that the haldol decanoate was helpful to decrease paranoid delusions so that the pt was able to participate in her treatment and take her medical medication and allow treatment.    Will get oncology to hopefully give some information of treatment options and prognosis of the colon CA and metastasis.  Would also reconsult with Dr Nunez [phone] /[phone] but that seems more for  the diabetic condition.

## 2018-09-21 LAB
GLUCOSE BLDC GLUCOMTR-MCNC: 346 MG/DL — HIGH (ref 70–99)
GLUCOSE BLDC GLUCOMTR-MCNC: 355 MG/DL — HIGH (ref 70–99)

## 2018-09-21 RX ADMIN — ATORVASTATIN CALCIUM 40 MILLIGRAM(S): 80 TABLET, FILM COATED ORAL at 20:51

## 2018-09-21 RX ADMIN — LISINOPRIL 5 MILLIGRAM(S): 2.5 TABLET ORAL at 11:02

## 2018-09-21 RX ADMIN — INSULIN GLARGINE 30 UNIT(S): 100 INJECTION, SOLUTION SUBCUTANEOUS at 11:00

## 2018-09-21 RX ADMIN — Medication 10: at 10:58

## 2018-09-21 RX ADMIN — Medication 1 MILLIGRAM(S): at 11:01

## 2018-09-21 RX ADMIN — Medication 1 MILLIGRAM(S): at 20:51

## 2018-09-21 RX ADMIN — ENOXAPARIN SODIUM 40 MILLIGRAM(S): 100 INJECTION SUBCUTANEOUS at 11:00

## 2018-09-21 RX ADMIN — DIVALPROEX SODIUM 750 MILLIGRAM(S): 500 TABLET, DELAYED RELEASE ORAL at 11:01

## 2018-09-21 RX ADMIN — Medication 4: at 20:52

## 2018-09-21 RX ADMIN — HALOPERIDOL DECANOATE 10 MILLIGRAM(S): 100 INJECTION INTRAMUSCULAR at 11:01

## 2018-09-21 NOTE — PROGRESS NOTE BEHAVIORAL HEALTH - PROBLEM SELECTOR PLAN 2
pt refusing labs   periodically allowing monitoring, most of the time no
monitoring of blood glucose
PT is refusing her meds. That si her baseline for the last 1 year while kin treatment in [facility].
pt is refusing monitoring and medications
Pt is delusional and is refusing treatment.  Continue to encourage meds and treatment.  Family and Palliative care legal?, meeting for plan of care.
pt on insulin
pt on insulin
pt with no complains at this time

## 2018-09-21 NOTE — PROGRESS NOTE BEHAVIORAL HEALTH - SUMMARY
52 YOWW with schizoaffective disorder bipolar Type. with colon cancer 4th stage with newly discovered liver mets, presents to ER psychotic, manic , in the light of noncompliance and partial compliance. She gets haldol dec injection and last one was on 8/16/18, but she is not compliant with other meds.   PT is not suicidal or homicidal. But she si disorganized and cannot care for herself.   care coordinated withy ER attending. They are still completing medical workup and will admit pt to medicine. As per [facility] pt presents the same way for at least 1 year and the only change in behavior is that she stopped attending her group psychotherapy sessions.        haldol dec. next one is due on 9/13/18.

## 2018-09-21 NOTE — PROGRESS NOTE BEHAVIORAL HEALTH - PROBLEM SELECTOR PROBLEM 2
Diabetes mellitus
Diabetes mellitus
Noncompliance with medications
Diabetes mellitus
Noncompliance
Diabetes mellitus
Diabetes mellitus
Corneal ulcer

## 2018-09-21 NOTE — PROGRESS NOTE BEHAVIORAL HEALTH - DETAILS
stage 4 colon cancer with mets to the liver and lung  as recorded by information from [facility] DM

## 2018-09-21 NOTE — PROVIDER CONTACT NOTE (OTHER) - BACKGROUND
Patient has stage 4 lung cancer metastasis to lung and liver. Pt non compliant with medical and refusal of exam, blood work. Stopped f/u with joe ketterring. Please write prognosis. Thanks

## 2018-09-21 NOTE — PROGRESS NOTE BEHAVIORAL HEALTH - SECONDARY DX3
Noncompliance
Noncompliance by refusing intervention or support
Noncompliance by refusing intervention or support
Diabetes mellitus

## 2018-09-21 NOTE — PROGRESS NOTE BEHAVIORAL HEALTH - NSBHFUPINTERVALHXFT_PSY_A_CORE
Spoke with Dr Sanford, Oncologist who is familiar with the pt's hx and was the oncologist who was originally to see the pt on  but the pt refused to allow her to be examined. She indicated that in general prognosis based on the clinical report from [facility] and that pt had treatment and then was lost to follow up since May that it was 6 months.    Official consult made to Dr Sanford for an official consultation was done.    Pt was able to take all of her medications both psychiatric and medical today.  Pt was willing to take medication willingly even though she initiailly refused to take any medication when her  came and asked her to take the medications Pt still mumbling to herself, appears preoccupied. Wandering the halls and sporatically attending groups

## 2018-09-21 NOTE — PROGRESS NOTE BEHAVIORAL HEALTH - NSBHCHARTREVIEWVS_PSY_A_CORE FT
Vital Signs Last 24 Hrs  T(C): 36.5 (21 Sep 2018 08:55), Max: 36.5 (21 Sep 2018 08:55)  T(F): 97.7 (21 Sep 2018 08:55), Max: 97.7 (21 Sep 2018 08:55)  HR: 88 (21 Sep 2018 08:55) (88 - 88)  BP: 117/67 (21 Sep 2018 08:55) (117/67 - 117/67)  BP(mean): --  RR: 12 (21 Sep 2018 08:55) (12 - 12)  SpO2: 100% (21 Sep 2018 08:55) (100% - 100%)

## 2018-09-21 NOTE — PROGRESS NOTE BEHAVIORAL HEALTH - DETAILS
stage 4 colon cancer with mets to the liver and lung  as recorded by information from [facility] DM

## 2018-09-21 NOTE — PROGRESS NOTE BEHAVIORAL HEALTH - PROBLEM SELECTOR PROBLEM 1
Colon cancer
Colon cancer
Schizoaffective disorder, bipolar type
Colon cancer
Schizoaffective disorder, bipolar type
Colon cancer

## 2018-09-22 LAB
ALBUMIN SERPL ELPH-MCNC: 3.1 G/DL — LOW (ref 3.3–5)
ALP SERPL-CCNC: 472 U/L — HIGH (ref 40–120)
ALT FLD-CCNC: 123 U/L — HIGH (ref 12–78)
ANION GAP SERPL CALC-SCNC: 8 MMOL/L — SIGNIFICANT CHANGE UP (ref 5–17)
AST SERPL-CCNC: 76 U/L — HIGH (ref 15–37)
BASOPHILS # BLD AUTO: 0.03 K/UL — SIGNIFICANT CHANGE UP (ref 0–0.2)
BASOPHILS NFR BLD AUTO: 0.7 % — SIGNIFICANT CHANGE UP (ref 0–2)
BILIRUB SERPL-MCNC: 0.3 MG/DL — SIGNIFICANT CHANGE UP (ref 0.2–1.2)
BUN SERPL-MCNC: 11 MG/DL — SIGNIFICANT CHANGE UP (ref 7–23)
CALCIUM SERPL-MCNC: 9 MG/DL — SIGNIFICANT CHANGE UP (ref 8.5–10.1)
CHLORIDE SERPL-SCNC: 104 MMOL/L — SIGNIFICANT CHANGE UP (ref 96–108)
CO2 SERPL-SCNC: 25 MMOL/L — SIGNIFICANT CHANGE UP (ref 22–31)
CREAT SERPL-MCNC: 0.6 MG/DL — SIGNIFICANT CHANGE UP (ref 0.5–1.3)
EOSINOPHIL # BLD AUTO: 0.06 K/UL — SIGNIFICANT CHANGE UP (ref 0–0.5)
EOSINOPHIL NFR BLD AUTO: 1.3 % — SIGNIFICANT CHANGE UP (ref 0–6)
GLUCOSE BLDC GLUCOMTR-MCNC: 267 MG/DL — HIGH (ref 70–99)
GLUCOSE BLDC GLUCOMTR-MCNC: 319 MG/DL — HIGH (ref 70–99)
GLUCOSE BLDC GLUCOMTR-MCNC: 325 MG/DL — HIGH (ref 70–99)
GLUCOSE BLDC GLUCOMTR-MCNC: 379 MG/DL — HIGH (ref 70–99)
GLUCOSE SERPL-MCNC: 248 MG/DL — HIGH (ref 70–99)
HCT VFR BLD CALC: 37.5 % — SIGNIFICANT CHANGE UP (ref 34.5–45)
HGB BLD-MCNC: 12.7 G/DL — SIGNIFICANT CHANGE UP (ref 11.5–15.5)
IMM GRANULOCYTES NFR BLD AUTO: 2 % — HIGH (ref 0–1.5)
LYMPHOCYTES # BLD AUTO: 1.6 K/UL — SIGNIFICANT CHANGE UP (ref 1–3.3)
LYMPHOCYTES # BLD AUTO: 35.9 % — SIGNIFICANT CHANGE UP (ref 13–44)
MCHC RBC-ENTMCNC: 30.1 PG — SIGNIFICANT CHANGE UP (ref 27–34)
MCHC RBC-ENTMCNC: 33.9 GM/DL — SIGNIFICANT CHANGE UP (ref 32–36)
MCV RBC AUTO: 88.9 FL — SIGNIFICANT CHANGE UP (ref 80–100)
MONOCYTES # BLD AUTO: 0.46 K/UL — SIGNIFICANT CHANGE UP (ref 0–0.9)
MONOCYTES NFR BLD AUTO: 10.3 % — SIGNIFICANT CHANGE UP (ref 2–14)
NEUTROPHILS # BLD AUTO: 2.22 K/UL — SIGNIFICANT CHANGE UP (ref 1.8–7.4)
NEUTROPHILS NFR BLD AUTO: 49.8 % — SIGNIFICANT CHANGE UP (ref 43–77)
NRBC # BLD: 0 /100 WBCS — SIGNIFICANT CHANGE UP (ref 0–0)
PLATELET # BLD AUTO: 208 K/UL — SIGNIFICANT CHANGE UP (ref 150–400)
POTASSIUM SERPL-MCNC: 4 MMOL/L — SIGNIFICANT CHANGE UP (ref 3.5–5.3)
POTASSIUM SERPL-SCNC: 4 MMOL/L — SIGNIFICANT CHANGE UP (ref 3.5–5.3)
PROT SERPL-MCNC: 7.3 GM/DL — SIGNIFICANT CHANGE UP (ref 6–8.3)
RBC # BLD: 4.22 M/UL — SIGNIFICANT CHANGE UP (ref 3.8–5.2)
RBC # FLD: 13.7 % — SIGNIFICANT CHANGE UP (ref 10.3–14.5)
SODIUM SERPL-SCNC: 137 MMOL/L — SIGNIFICANT CHANGE UP (ref 135–145)
WBC # BLD: 4.46 K/UL — SIGNIFICANT CHANGE UP (ref 3.8–10.5)
WBC # FLD AUTO: 4.46 K/UL — SIGNIFICANT CHANGE UP (ref 3.8–10.5)

## 2018-09-22 RX ADMIN — Medication 6: at 07:49

## 2018-09-22 RX ADMIN — DIVALPROEX SODIUM 750 MILLIGRAM(S): 500 TABLET, DELAYED RELEASE ORAL at 08:59

## 2018-09-22 RX ADMIN — Medication 10: at 17:27

## 2018-09-22 RX ADMIN — ENOXAPARIN SODIUM 40 MILLIGRAM(S): 100 INJECTION SUBCUTANEOUS at 09:24

## 2018-09-22 RX ADMIN — Medication 1 MILLIGRAM(S): at 08:58

## 2018-09-22 RX ADMIN — HALOPERIDOL DECANOATE 10 MILLIGRAM(S): 100 INJECTION INTRAMUSCULAR at 08:58

## 2018-09-22 RX ADMIN — LISINOPRIL 5 MILLIGRAM(S): 2.5 TABLET ORAL at 08:59

## 2018-09-22 RX ADMIN — Medication 1 MILLIGRAM(S): at 21:47

## 2018-09-22 RX ADMIN — INSULIN GLARGINE 30 UNIT(S): 100 INJECTION, SOLUTION SUBCUTANEOUS at 21:47

## 2018-09-22 RX ADMIN — Medication 8: at 12:01

## 2018-09-22 RX ADMIN — ATORVASTATIN CALCIUM 40 MILLIGRAM(S): 80 TABLET, FILM COATED ORAL at 21:46

## 2018-09-22 RX ADMIN — Medication 4: at 21:48

## 2018-09-23 LAB
GLUCOSE BLDC GLUCOMTR-MCNC: 258 MG/DL — HIGH (ref 70–99)
GLUCOSE BLDC GLUCOMTR-MCNC: 294 MG/DL — HIGH (ref 70–99)
GLUCOSE BLDC GLUCOMTR-MCNC: 294 MG/DL — HIGH (ref 70–99)
GLUCOSE BLDC GLUCOMTR-MCNC: 297 MG/DL — HIGH (ref 70–99)

## 2018-09-23 RX ADMIN — Medication 2: at 22:14

## 2018-09-23 RX ADMIN — Medication 1 MILLIGRAM(S): at 10:03

## 2018-09-23 RX ADMIN — Medication 1 MILLIGRAM(S): at 22:07

## 2018-09-23 RX ADMIN — HALOPERIDOL DECANOATE 10 MILLIGRAM(S): 100 INJECTION INTRAMUSCULAR at 10:03

## 2018-09-23 RX ADMIN — Medication 6: at 07:56

## 2018-09-23 RX ADMIN — Medication 6: at 12:26

## 2018-09-23 RX ADMIN — Medication 6: at 17:04

## 2018-09-23 RX ADMIN — DIVALPROEX SODIUM 750 MILLIGRAM(S): 500 TABLET, DELAYED RELEASE ORAL at 10:03

## 2018-09-23 RX ADMIN — INSULIN GLARGINE 30 UNIT(S): 100 INJECTION, SOLUTION SUBCUTANEOUS at 22:14

## 2018-09-23 RX ADMIN — ENOXAPARIN SODIUM 40 MILLIGRAM(S): 100 INJECTION SUBCUTANEOUS at 10:03

## 2018-09-23 RX ADMIN — ATORVASTATIN CALCIUM 40 MILLIGRAM(S): 80 TABLET, FILM COATED ORAL at 22:07

## 2018-09-23 RX ADMIN — LISINOPRIL 5 MILLIGRAM(S): 2.5 TABLET ORAL at 10:03

## 2018-09-23 NOTE — PROGRESS NOTE BEHAVIORAL HEALTH - NSBHADMITDANGERSELF_PSY_A_CORE
unable to care for self
suicidal behavior/unable to care for self
suicidal behavior/unable to care for self
unable to care for self

## 2018-09-23 NOTE — PROGRESS NOTE BEHAVIORAL HEALTH - NSBHFUPINTERVALCCFT_PSY_A_CORE
"You aren't supposed to be concerned about anything aside from mental issues , and not about scans and other body parts . I already take the pills and I want to leave this hospital on monday after the scans. " " my sister knows that there is no reason for any family meeting and I am taking the medication and I want to be discharged."

## 2018-09-23 NOTE — PROGRESS NOTE BEHAVIORAL HEALTH - NSBHCHARTREVIEWVS_PSY_A_CORE FT
Vital Signs Last 24 Hrs  T(C): 36.7 (23 Sep 2018 09:08), Max: 36.7 (23 Sep 2018 09:08)  T(F): 98.1 (23 Sep 2018 09:08), Max: 98.1 (23 Sep 2018 09:08)  HR: 89 (23 Sep 2018 09:08) (89 - 89)  BP: 109/56 (23 Sep 2018 09:08) (109/56 - 109/56)  BP(mean): --  RR: 14 (23 Sep 2018 09:08) (14 - 14)  SpO2: 99% (23 Sep 2018 09:08) (99% - 99%)

## 2018-09-23 NOTE — PROGRESS NOTE BEHAVIORAL HEALTH - NSBHFUPINTERVALHXFT_PSY_A_CORE
Pt is still scheduled for the MRI for the head and abdomen. .  Pt with lost to follow up since May 2018 for oncology. .Pt with gradually increasing hepatic function.    enzymes .  Pt not appearing to be in distress .  Radiology still not willing to do the MRI until they get added information about the indwelling chemo pump that the pt has on the abdominal wall.   Nursing is indicating that the pt is currently compliant with the medications all of the diabetic and the psychiatric medication haldol.   Pt continues to deny that she has any medical or psychiatric illness and is not willing to be engaged in any conversation with me. Pt continues to walk away and claim there is nothing to talk about.  Pt currently wanting to be discharged.

## 2018-09-23 NOTE — PROGRESS NOTE BEHAVIORAL HEALTH - DETAILS
stage 4 colon cancer with mets to the liver and lung  as recorded by information from [facility] DM

## 2018-09-24 LAB
GLUCOSE BLDC GLUCOMTR-MCNC: 208 MG/DL — HIGH (ref 70–99)
GLUCOSE BLDC GLUCOMTR-MCNC: 217 MG/DL — HIGH (ref 70–99)
GLUCOSE BLDC GLUCOMTR-MCNC: 263 MG/DL — HIGH (ref 70–99)
GLUCOSE BLDC GLUCOMTR-MCNC: 288 MG/DL — HIGH (ref 70–99)

## 2018-09-24 PROCEDURE — 99223 1ST HOSP IP/OBS HIGH 75: CPT

## 2018-09-24 RX ADMIN — INSULIN GLARGINE 30 UNIT(S): 100 INJECTION, SOLUTION SUBCUTANEOUS at 21:10

## 2018-09-24 RX ADMIN — Medication 6: at 08:11

## 2018-09-24 RX ADMIN — DIVALPROEX SODIUM 750 MILLIGRAM(S): 500 TABLET, DELAYED RELEASE ORAL at 09:09

## 2018-09-24 RX ADMIN — LISINOPRIL 5 MILLIGRAM(S): 2.5 TABLET ORAL at 09:10

## 2018-09-24 RX ADMIN — Medication 1 MILLIGRAM(S): at 21:10

## 2018-09-24 RX ADMIN — ENOXAPARIN SODIUM 40 MILLIGRAM(S): 100 INJECTION SUBCUTANEOUS at 09:09

## 2018-09-24 RX ADMIN — ATORVASTATIN CALCIUM 40 MILLIGRAM(S): 80 TABLET, FILM COATED ORAL at 21:11

## 2018-09-24 RX ADMIN — Medication 4: at 17:46

## 2018-09-24 RX ADMIN — Medication 2: at 21:09

## 2018-09-24 RX ADMIN — Medication 4: at 12:55

## 2018-09-24 RX ADMIN — HALOPERIDOL DECANOATE 10 MILLIGRAM(S): 100 INJECTION INTRAMUSCULAR at 09:09

## 2018-09-24 RX ADMIN — Medication 1 MILLIGRAM(S): at 09:09

## 2018-09-24 NOTE — CONSULT NOTE ADULT - SUBJECTIVE AND OBJECTIVE BOX
51 y/o F w/Schizoaffective disorder bipolar type, DM2, Stage 4 colon cancer with new mets to the liver s/p chemo 5yrs ago and radiation, corneal ulcer was brought to the ED by law enforcement with the complaints of erratic behaviour, hostility and aggressiveness towards . As per , pt stopped taking all her medications and stopped following up with oncologist and endocrinologist because she believed she has been cured and has no medical issues. Dr Garrett has requested a Hematology and Oncology consult to determine prognosis.     Patient refused to speak to me.     PAST MEDICAL & SURGICAL HISTORY:  Corneal ulcer  Diabetes mellitus  Schizoaffective disorder, bipolar type  Status post chemotherapy: h/o chemoport insertion at left sided abdomen underneath skin  S/P radiotherapy    FAMILY HISTORY:  Family history of autism (Child)  Family history of Down syndrome (Child)  Family history of heart disease (Father)  Family history of colon cancer in mother (Mother): Diagnosed at age 63, diead at age 65  Family history of schizophrenia (Mother)    REVIEW OF SYSTEMS:  CONSTITUTIONAL: No weakness, fevers or chills  EYES/ENT: No visual changes;  No vertigo or throat pain   NECK: No pain or stiffness  RESPIRATORY: No cough, wheezing, hemoptysis; No shortness of breath  CARDIOVASCULAR: No chest pain or palpitations  GASTROINTESTINAL: No abdominal or epigastric pain. No nausea, vomiting, or hematemesis; No diarrhea or constipation. No melena or hematochezia.  GENITOURINARY: No dysuria, frequency or hematuria  NEUROLOGICAL: No numbness or weakness  SKIN: No itching, burning, rashes, or lesions   All other review of systems is negative unless indicated above        Other Review of Systems: All other review of systems negative, except as noted in HPI       ICU Vital Signs Last 24 Hrs  T(C): 36.9 (24 Sep 2018 08:15), Max: 36.9 (24 Sep 2018 08:15)  T(F): 98.4 (24 Sep 2018 08:15), Max: 98.4 (24 Sep 2018 08:15)  HR: 87 (24 Sep 2018 08:15) (87 - 87)  BP: 101/53 (24 Sep 2018 08:15) (101/53 - 101/53)  BP(mean): --  ABP: --  ABP(mean): --  RR: 12 (24 Sep 2018 08:15) (12 - 12)  SpO2: 100% (24 Sep 2018 08:15) (100% - 100%)      Patient refused physical exam             Complete Blood Count + Automated Diff in AM (09.22.18 @ 07:21)    WBC Count: 4.46 K/uL    RBC Count: 4.22 M/uL    Hemoglobin: 12.7 g/dL    Hematocrit: 37.5 %    Mean Cell Volume: 88.9 fl    Mean Cell Hemoglobin: 30.1 pg    Mean Cell Hemoglobin Conc: 33.9 gm/dL    Red Cell Distrib Width: 13.7 %    Platelet Count - Automated: 208 K/uL    Auto Neutrophil #: 2.22 K/uL    Auto Lymphocyte #: 1.60 K/uL    Auto Monocyte #: 0.46 K/uL    Auto Eosinophil #: 0.06 K/uL    Auto Basophil #: 0.03 K/uL    Auto Neutrophil %: 49.8: Differential percentages must be correlated with absolute numbers for  clinical significance. %    Auto Lymphocyte %: 35.9 %    Auto Monocyte %: 10.3 %    Auto Eosinophil %: 1.3 %    Auto Basophil %: 0.7 %    Auto Immature Granulocyte %: 2.0 %        Comprehensive Metabolic Panel (09.22.18 @ 07:21)    Sodium, Serum: 137 mmol/L    Potassium, Serum: 4.0 mmol/L    Chloride, Serum: 104 mmol/L    Carbon Dioxide, Serum: 25 mmol/L    Anion Gap, Serum: 8 mmol/L    Blood Urea Nitrogen, Serum: 11 mg/dL    Creatinine, Serum: 0.60 mg/dL    Glucose, Serum: 248 mg/dL    Calcium, Total Serum: 9.0 mg/dL    Protein Total, Serum: 7.3 gm/dL    Albumin, Serum: 3.1 g/dL    Bilirubin Total, Serum: 0.3 mg/dL    Alkaline Phosphatase, Serum: 472 U/L    Aspartate Aminotransferase (AST/SGOT): 76 U/L    Alanine Aminotransferase (ALT/SGPT): 123 U/L    eGFR if Non : 103: Interpretative comment  The units for eGFR are ml/min/1.73m2 (normalized body surface area). The  eGFR is calculated from a serum creatinine using the CKD-EPI equation.  Other variables required for calculation are race, age and sex. Among  patients with chronic kidney disease (CKD), the eGFR is useful in  determining the stage of disease according to KDOQI CKD classification.  All eGFR results are reported numerically with the following  interpretation.          GFR                    With                 Without     (ml/min/1.73 m2)    Kidney Damage       Kidney Damage        >= 90                    Stage 1                     Normal        60-89                    Stage 2                     Decreased GFR        30-59     Stage 3                     Stage 3        15-29                    Stage 4                     Stage 4        < 15                      Stage 5                     Stage 5  Each stage of CKD assumes that the associated GFR level has been in  effect for at least 3 months. Determination of stages one and two (with  eGFR > 59 ml/min/m2) requires estimation of kidney damage for at least 3  months as defined by structural or functional abnormalities.  Limitations: All estimates of GFR will be less accurate for patients at  extremes of muscle mass (including but not limited to frail elderly,  critically ill, or cancer patients), those with unusual diets, and those  with conditions associated with reduced secretion or extrarenal  elimination of creatinine. The eGFR equation is not recommended for use  in patients with unstable creatinine levels. mL/min/1.73M2    eGFR if African American: 120 mL/min/1.73M2

## 2018-09-24 NOTE — PROGRESS NOTE BEHAVIORAL HEALTH - NSBHCHARTREVIEWVS_PSY_A_CORE FT
Vital Signs Last 24 Hrs  T(C): 36.9 (24 Sep 2018 08:15), Max: 36.9 (24 Sep 2018 08:15)  T(F): 98.4 (24 Sep 2018 08:15), Max: 98.4 (24 Sep 2018 08:15)  HR: 87 (24 Sep 2018 08:15) (87 - 87)  BP: 101/53 (24 Sep 2018 08:15) (101/53 - 101/53)  BP(mean): --  RR: 12 (24 Sep 2018 08:15) (12 - 12)  SpO2: 100% (24 Sep 2018 08:15) (100% - 100%)

## 2018-09-24 NOTE — CONSULT NOTE ADULT - ASSESSMENT
51 y/o F w/Schizoaffective disorder bipolar type, DM2, Stage 4 colon cancer with new mets to the liver s/p chemo 5yrs ago and radiation. Patient is currently having an acute psychotic event, she needs to be evaluated by psych; needs to be stable before she can be treated for metastatic colon cancer.  Patient refused to speak to me.     Check carcinoembryonic antigen.    The prognosis of this patient with metastatic colorectal cancer is approx 3-6 months.     Patient can be evaluated in the outpatient setting once she recovers from this acute event.       Thanks.

## 2018-09-24 NOTE — PROGRESS NOTE BEHAVIORAL HEALTH - DETAILS
stage 4 colon cancer with mets to the liver and lung  as recorded by information from [facility] DM

## 2018-09-24 NOTE — PROGRESS NOTE BEHAVIORAL HEALTH - SUMMARY
52 YOWW with schizoaffective disorder bipolar Type. with colon cancer 4th stage with newly discovered liver mets, presents to ER psychotic, manic , in the light of noncompliance and partial compliance. She gets haldol dec injection and last one was on 8/16/18, but she is not compliant with other meds.   PT is not suicidal or homicidal. But she si disorganized and cannot care for herself.   care coordinated withy ER attending. They are still completing medical workup and will admit pt to medicine. As per FSL pt presents the same way for at least 1 year and the only change in behavior is that she stopped attending her group psychotherapy sessions.       [facility] plan is astrid keep pt on 1;1 and continue haldol dec. next one is due on 9/13/18.  Would do CT head and check ammonia level.     C&L will f/u

## 2018-09-24 NOTE — PROGRESS NOTE BEHAVIORAL HEALTH - NSBHFUPINTERVALHXFT_PSY_A_CORE
According to radiology  they are still awaiting information about the chemo pump that is indwelling.  Pt according to nursing was continuing to take all of her medications at this time. Pt is aware of the order for the MRI of the head and abdomin .  Pt still would like to be discharged ..

## 2018-09-25 LAB
CEA SERPL-MCNC: 4.4 NG/ML — HIGH (ref 0–3.8)
GLUCOSE BLDC GLUCOMTR-MCNC: 219 MG/DL — HIGH (ref 70–99)
GLUCOSE BLDC GLUCOMTR-MCNC: 230 MG/DL — HIGH (ref 70–99)
GLUCOSE BLDC GLUCOMTR-MCNC: 237 MG/DL — HIGH (ref 70–99)
GLUCOSE BLDC GLUCOMTR-MCNC: 243 MG/DL — HIGH (ref 70–99)

## 2018-09-25 RX ORDER — TRAZODONE HCL 50 MG
50 TABLET ORAL AT BEDTIME
Qty: 0 | Refills: 0 | Status: DISCONTINUED | OUTPATIENT
Start: 2018-09-25 | End: 2018-10-09

## 2018-09-25 RX ORDER — TRAZODONE HCL 50 MG
75 TABLET ORAL AT BEDTIME
Qty: 0 | Refills: 0 | Status: DISCONTINUED | OUTPATIENT
Start: 2018-09-25 | End: 2018-10-09

## 2018-09-25 RX ADMIN — ATORVASTATIN CALCIUM 40 MILLIGRAM(S): 80 TABLET, FILM COATED ORAL at 21:02

## 2018-09-25 RX ADMIN — Medication 75 MILLIGRAM(S): at 21:02

## 2018-09-25 RX ADMIN — Medication 4: at 12:28

## 2018-09-25 RX ADMIN — Medication 4: at 08:10

## 2018-09-25 RX ADMIN — Medication 1 MILLIGRAM(S): at 21:02

## 2018-09-25 RX ADMIN — Medication 1 MILLIGRAM(S): at 08:11

## 2018-09-25 RX ADMIN — INSULIN GLARGINE 30 UNIT(S): 100 INJECTION, SOLUTION SUBCUTANEOUS at 21:02

## 2018-09-25 RX ADMIN — Medication 4: at 17:24

## 2018-09-25 RX ADMIN — DIVALPROEX SODIUM 750 MILLIGRAM(S): 500 TABLET, DELAYED RELEASE ORAL at 08:11

## 2018-09-25 RX ADMIN — HALOPERIDOL DECANOATE 10 MILLIGRAM(S): 100 INJECTION INTRAMUSCULAR at 08:11

## 2018-09-25 NOTE — PROGRESS NOTE BEHAVIORAL HEALTH - NSBHFUPINTERVALHXFT_PSY_A_CORE
Radiology dept finally got the information from [facility] about the in dwelling chemo pump and was willing to do the MRI for the head and abdomin. Pt who along verbalized willing to have the scans has now refused to have the scans.    Pt currently according to nursing is taking her medications. She is goal directed and is aware of the purpose of  the scans and is able to indicated that she is not wanting them done here.   Her family  is requesting a meeting .The patient is now with compliance with medication and is no longer with paranoid delusions, and is denying any suicidal ideation or plan . Pt  should be considered for discharge to resume her treatment at [facility]. Radiology dept finally got the information from [facility] about the in dwelling chemo pump and was willing to do the MRI for the head and abdomin. Pt who along verbalized willing to have the scans has now refused to have the scans.    Pt currently according to nursing is taking her medications. She is goal directed and is aware of the purpose of  the scans and is able to indicated that she is not wanting them done here.   Her family  is requesting a meeting .The patient is now with compliance with medication and is no longer with paranoid delusions, and is denying any suicidal ideation or plan . Pt  should be considered for discharge to resume her treatment at [facility].    Called the  Jewel Silveira [phone]

## 2018-09-25 NOTE — DISCHARGE NOTE BEHAVIORAL HEALTH - MEDICATION SUMMARY - MEDICATIONS TO TAKE
I will START or STAY ON the medications listed below when I get home from the hospital:    lisinopril 5 mg oral tablet  -- 1 tab(s) by mouth once a day  -- Indication: For htn    divalproex sodium 500 mg oral delayed release tablet  -- 2 tab(s) by mouth once a day  -- Indication: For Schizoaffective disorder, bipolar type    traZODone 50 mg oral tablet  -- 1 tab(s) by mouth once a day (at bedtime)  -- Indication: For insomnia    atorvastatin 40 mg oral tablet  -- 1 tab(s) by mouth once a day (at bedtime)  -- Indication: For hyperlipid    benztropine 1 mg oral tablet  -- 1 tab(s) by mouth 2 times a day  -- Indication: For Prophylaxis for eps    haloperidol decanoate 100 mg/mL intramuscular solution  -- 1 milliliter(s) intramuscular every 4 weeks  -- Indication: For Schizoaffective disorder, bipolar type    haloperidol 10 mg oral tablet  -- 1 tab(s) by mouth once a day  -- Indication: For Schizoaffective disorder, bipolar type

## 2018-09-25 NOTE — DISCHARGE NOTE BEHAVIORAL HEALTH - NSBHDCMEDICALFT_PSY_A_CORE
Corneal ulcer  Diabetes mellitus monitor by fingerstick and added coverage with insulin.  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     Status post from [facility] chemotherapy: h/o chemo port insertion at left sided abdomen underneath skin, S/P radiotherapy.  Pt was continuing with local ablation treatment until she was lost to follow up for [facility] since May 2018.

## 2018-09-25 NOTE — DISCHARGE NOTE BEHAVIORAL HEALTH - NSBHDCIDCAREGVRNAMEFT_PSY_A_CORE
Name given upon admission was not correct.  Ms. Nielsen now identifies her caregiver as Jewel Silveira, her  ([phone]).

## 2018-09-25 NOTE — DISCHARGE NOTE BEHAVIORAL HEALTH - MEDICATION SUMMARY - MEDICATIONS TO STOP TAKING
I will STOP taking the medications listed below when I get home from the hospital:    heparin  -- 5000 unit(s) subcutaneous 2 times a day    sodium chloride 0.9% injectable solution  --  injectable    Dextrose 5% in Water intravenous solution  -- 1000 milliliter(s) intravenous    glucose 40% oral gel  --  by mouth    glucose 50% intravenous solution  -- 25 milliliter(s) intravenous once    glucose 50% intravenous solution  -- 50 milliliter(s) intravenous once    glucose 50% intravenous solution  -- 50 milliliter(s) intravenous once    diphenhydrAMINE 50 mg/mL injectable solution  -- 50 milligram(s) injectable every 6 hours, As Needed    LORazepam  -- 2 milligram(s) intramuscular every 6 hours    insulin glargine  -- 10 unit(s) subcutaneous once a day (in the morning)    insulin glargine  -- 15 unit(s) subcutaneous once a day (at bedtime)    insulin lispro (concentrated) 200 units/mL subcutaneous solution  -- 3 unit(s) subcutaneous once a day MDD:once  a day before breakfast    insulin lispro (concentrated) 200 units/mL subcutaneous solution  -- 3 unit(s) subcutaneous once a day MDD:once  a day before lunch    insulin lispro (concentrated) 200 units/mL subcutaneous solution  -- 3 unit(s) subcutaneous once a day MDD:once  a day before dinner    Dextrose 5% in Water intravenous solution  -- 1000 milliliter(s) intravenous

## 2018-09-25 NOTE — PROGRESS NOTE BEHAVIORAL HEALTH - SUMMARY
52 YOWW with schizoaffective disorder bipolar Type. with colon cancer 4th stage with newly discovered liver mets, presents to ER psychotic, manic , in the light of noncompliance and partial compliance. She gets haldol dec injection and last one was on 8/16/18, but she is not compliant with other meds.   PT is not suicidal or homicidal. But she si disorganized and cannot care for herself.   care coordinated withy ER attending. They are still completing medical workup and will admit pt to medicine. As per FSL pt presents the same way for at least 1 year and the only change in behavior is that she stopped attending her group psychotherapy sessions.       [facility] plan is astrid keep pt on 1;1 and continue haldol dec. next one is due on 9/13/18.  Would do CT head and check ammonia level.     C&L will f/u

## 2018-09-25 NOTE — DISCHARGE NOTE BEHAVIORAL HEALTH - NSBHDCCRISISPROB1FT_PSY_A_CORE
Any thoughts of hurting yourself or others or return of acute symptoms, unusual thoughts or behaviors.

## 2018-09-25 NOTE — DISCHARGE NOTE BEHAVIORAL HEALTH - NSBHDCVIOLSAFETYFT_PSY_A_CORE
1. Pt is aware that if the symptoms of suicidal thoughts return that the pt is to go to the ER to be evaluated and obtain external supports and if needed medication.  2. Pt able to indicate clear recognition of symptoms  of not being well such as poor sleep, increase in anxiety, feeling depressed, having thoughts of self injury, having active or passive thoughts of  death , hallucinations , bizarre  thoughts , paranoid  thoughts. that in the interim until she connects to the outside therapist and MD that Dr Garrett and the treatment team can be contacted at [facility] [phone].  3. Both the pt and the family are both aware of the discharge plan and of the aftercare appts set for the pt  4. Pt is aware of the importance to tell family, their social support system and their therapist and provider if they experience jared suicidal thoughts, and be willing to receive help to not act on theses thoughts.

## 2018-09-25 NOTE — PROGRESS NOTE BEHAVIORAL HEALTH - NSBHFUPINTERVALCCFT_PSY_A_CORE
"I don't want the scan to be done here I want to go back to [facility] . This is a hospital that is not a cancer center . "

## 2018-09-25 NOTE — DISCHARGE NOTE BEHAVIORAL HEALTH - NSBHDCSWCOMMENTSFT_PSY_A_CORE
Ms. Hegarty and her , Jewel Silveira, have been educated about her illness, the importance of remaining in outpatient treatment, the importance of taking her medication as prescribed and about her discharge plan.  They were also educated about the importance of outpatient followup at [facility] for her physical issues.

## 2018-09-25 NOTE — DISCHARGE NOTE BEHAVIORAL HEALTH - NSBHDCVIOLPROTECTFT_PSY_A_CORE
1. offered haldol decanoate but the pt refused, but continued eventually to take oral medications  2. 1. Pt on  haldol decanoate and continues with the oral dose of medication  2. pt with the support of family 1. Pt on  haldol decanoate and continues with the oral dose of medication Haldol Decanoate 100mg IM Q4 weeks  was given on September 26,2018  2. pt with the support of family and professional medical staff at [facility]

## 2018-09-25 NOTE — DISCHARGE NOTE BEHAVIORAL HEALTH - NSBHDCADDFT_PSY_A_CORE
09-08 [id number] 13.9    09-12 Chol 382<H> <H> HDL 48<L> Trig 325<H>  Carcinoembryonic Antigen: 4.4: METHOD: Roche EIA   The CEA assay should not be used as a cancer screening test. Serum CEA  concentrations should only be used in conjunction with   information available from the clinical evaluation of the patien and  from other diagnostic procedures.   CEA Normal Ranges   _________________   Non-smoker: less than 3.9 ng/mL       Smoker: less than 5.5 ng/mL ng/mL (09.25.18 @ 06:59) 09-08 [id number] 13.9    09-12 Chol 382<H> <H> HDL 48<L> Trig 325<H>  Carcinoembryonic Antigen: 4.4: METHOD: Roche EIA  Alkaline Phosphatase, Serum: 522 U/L (10.01.18 @ 16:59)     The CEA assay should not be used as a cancer screening test. Serum CEA  concentrations should only be used in conjunction with   information available from the clinical evaluation of the patien and  from other diagnostic procedures.   CEA Normal Ranges   _________________   Non-smoker: less than 3.9 ng/mL       Smoker: less than 5.5 ng/mL ng/mL (09.25.18 @ 06:59) 09-08 [id number] 13.9    09-12 Chol 382<H> <H> HDL 48<L> Trig 325<H>  Carcinoembryonic Antigen: 4.4: METHOD: Roche EIA  Alkaline Phosphatase, Serum: 522 U/L (10.01.18 @ 16:59)     The CEA assay should not be used as a cancer screening test. Serum CEA  concentrations should only be used in conjunction with   information available from the clinical evaluation of the patien and  from other diagnostic procedures.   CEA Normal Ranges   _________________   Non-smoker: less than 3.9 ng/mL       Smoker: less than 5.5 ng/mL ng/mL (09.25.18 @ 06:59)  Haldol Decanoate 100mg IM Q4 weeks  was given on September 26,2018 09-08 [id number] 13.9    09-12 Chol 382<H> <H> HDL 48<L> Trig 325<H>  Carcinoembryonic Antigen: 4.4: METHOD: Roche EIA  Alkaline Phosphatase, Serum: 522 U/L (10.01.18 @ 16:59)  QRS axis to [] ° and NSR at a rate of [] BPM. There was no atrial enlargement. There was no ventricular hypertrophy. There were no ST-T changes and all intervals were normal.       The CEA assay should not be used as a cancer screening test. Serum CEA  concentrations should only be used in conjunction with   information available from the clinical evaluation of the patien and  from other diagnostic procedures.   CEA Normal Ranges   _________________   Non-smoker: less than 3.9 ng/mL       Smoker: less than 5.5 ng/mL ng/mL (09.25.18 @ 06:59)  Haldol Decanoate 100mg IM Q4 weeks  was given on September 26,2018

## 2018-09-25 NOTE — PROGRESS NOTE BEHAVIORAL HEALTH - SUMMARY
52 YOWW with schizoaffective disorder bipolar Type. with colon cancer 4th stage with newly discovered liver mets, presents to ER psychotic, manic , in the light of noncompliance and partial compliance. She gets haldol dec injection and last one was on 8/16/18, but she is not compliant with other meds.   PT is not suicidal or homicidal. But she si disorganized and cannot care for herself.   care coordinated withy ER attending. They are still completing medical workup and will admit pt to medicine. As per FSL pt presents the same way for at least 1 year and the only change in behavior is that she stopped attending her group psychotherapy sessions.       [facility] plan is astrid keep pt on 1;1 and continue haldol dec. next one is due on 9/13/18.  Would do CT head and check ammonia level.     C&L will f/u

## 2018-09-25 NOTE — DISCHARGE NOTE BEHAVIORAL HEALTH - FAMILY HISTORY OF PSYCHIATRIC ILLNESS
FAMILY HISTORY:  Family history of autism (Child)  Family history of Down syndrome (Child)  Family history of heart disease (Father)

## 2018-09-25 NOTE — DISCHARGE NOTE BEHAVIORAL HEALTH - HPI (INCLUDE ILLNESS QUALITY, SEVERITY, DURATION, TIMING, CONTEXT, MODIFYING FACTORS, ASSOCIATED SIGNS AND SYMPTOMS)
Pt is 51 y/o female with hx of  Schizoaffective disorder, bipolr type,  noncompliant with Depakote ER 250mg 1 tab TID, , haldol Dec injection 50mg IM last injection on 8/16/18, and before that 6/22/ (missed a month),   followed by Dr Reza [phone] (called office, left a message), Spoke with Daysi, clinical director of [facility]. Pt has hx of noncompliance, she is psychotic, noncompliant, excessive spending, Mormonism preoccupation as her baseline.   Medical hx is significant for DM, stage 4 colon cancer new mets to liver with liver enzymes increased.  Per pt's sister, pt is paranoid that people are watching her, she is telling lies, has not been taking care of her body for 2-3 weeks, is delusional, and is feeling tired. Pt has been non-compliant with her metformin for the past month.  Pt denies any acute complaints at this time. She is expansive grandiose, laughing uncontrollably, but responds well to redirection.   She states that she belongs to special  type of women called "medium". That is the morgan kind of women. She is paranoid and endorses hearing voices of her decreased mother, grandmother, also Sheridan and Laura Mueller. Denies CAH. Pt states that she met in the past, but also that she is not Mormonism.   She states meds she takes are  "spookolishes" and "chocoladishes".   Pt denies ever having SI, or SA, no HI.   Multiple hospitalizations at least 6 x on psychiatry, in [facility],. [facility], [facility]. last hospitalizations in 2013. After that in treatment in [facility] and refereed to [facility] where is she in treatement now.   Therapist is Daysi Munson [phone]. 'family learned about liver metastases yesterday,.   OPT resides with her 22 YO son with Down sy and 18 YO son. her  is with children. Pt is 53 y/o female with hx of  Schizoaffective disorder, bipolr type,  noncompliant with Depakote ER 250mg 1 tab TID, , haldol Dec injection 50mg IM last injection on 8/16/18, and before that 6/22/ (missed a month),   followed by Dr Reza [phone] (called office, left a message), Spoke with Daysi, clinical director of [facility]. Pt has hx of noncompliance, she is psychotic, noncompliant, excessive spending, Tenriism preoccupation as her baseline.   Medical hx is significant for DM, stage 4 colon cancer new mets to liver with liver enzymes increased.  Per pt's sister, pt is paranoid that people are watching her, she is telling lies, has not been taking care of her body for 2-3 weeks, is delusional, and is feeling tired. Pt has been non-compliant with her metformin for the past month.  Pt denies any acute complaints at this time. She is expansive grandiose, laughing uncontrollably, but responds well to redirection.   She states that she belongs to special  type of women called "medium". That is the best kind of women. She is paranoid and endorses hearing voices of her decreased mother, grandmother, also Sheridan and Laura Mueller. Denies CAH. Pt states that she met in the past, but also that she is not Tenriism.   She states meds she takes are  "spookolishes" and "chocoladishes".   Pt denies ever having SI, or SA, no HI.   Multiple hospitalizations at least 6 x on psychiatry, in [facility],. [facility], [facility]. last hospitalizations in 2013. After that in treatment in [facility] and refereed to [facility] where is she in treatement now.   Therapist is Daysi Munson [phone]. 'family learned about liver metastases yesterday,.   OPT resides with her 22 YO son with Down sy and 16 YO son. her  is with children.

## 2018-09-25 NOTE — DISCHARGE NOTE BEHAVIORAL HEALTH - NSBHDCADDR3FT_A_CORE
[facility]  [address]  [facility]. for labwork and to see Dr. Mejia.

## 2018-09-25 NOTE — DISCHARGE NOTE BEHAVIORAL HEALTH - MEDICATION SUMMARY - MEDICATIONS TO CHANGE
I will SWITCH the dose or number of times a day I take the medications listed below when I get home from the hospital:    Depakote  -- 750  by mouth once a day    Haldol Decanoate 50 mg/mL intramuscular solution  -- 1  injectable every 30 days

## 2018-09-25 NOTE — DISCHARGE NOTE BEHAVIORAL HEALTH - NSBHDCCRISISPLAN1FT_PSY_A_CORE
Tell your family, tell staff at the [facility] program, go to the nearest  emergency room.  You may call 911 for assistance.

## 2018-09-25 NOTE — DISCHARGE NOTE BEHAVIORAL HEALTH - NSBHDCADDR1FT_A_CORE
[address] [facility] [facility] [address] [facility].  [address]  [place] [address] [address] [facility].  [address]  [place] [address]    Program is Monday through Friday, 9am-3pm.  Start:  Thursday 10/11. [address].  [address]  [place] [address]    Program is Monday through Friday, 9am-3pm.  Start:  Friday 10/12. Please arrive at 8:30am for paperwork.

## 2018-09-25 NOTE — DISCHARGE NOTE BEHAVIORAL HEALTH - PROVIDER TOKENS
FREE:[LAST:[[facility]],FIRST:[[facility]],PHONE:[(   )    -],FAX:[(   )    -]]

## 2018-09-25 NOTE — DISCHARGE NOTE BEHAVIORAL HEALTH - NSBHDCMEDSFT_PSY_A_CORE
atorvastatin 40 milliGRAM(s) Oral at bedtime  benztropine 1 milliGRAM(s) Oral two times a day  diVALproex  milliGRAM(s) Oral daily  enoxaparin Injectable 40 milliGRAM(s) SubCutaneous daily  haloperidol     Tablet 10 milliGRAM(s) Oral daily  insulin glargine Injectable (LANTUS) 30 Unit(s) SubCutaneous at bedtime  insulin lispro (HumaLOG) corrective regimen sliding scale   SubCutaneous at bedtime  insulin lispro (HumaLOG) corrective regimen sliding scale   SubCutaneous three times a day before meals  lisinopril 5 milliGRAM(s) Oral daily MEDICATIONS  (STANDING):  atorvastatin 40 milliGRAM(s) Oral at bedtime  benztropine 1 milliGRAM(s) Oral two times a day  diVALproex DR 1000 milliGRAM(s) Oral daily  enoxaparin Injectable 40 milliGRAM(s) SubCutaneous daily  haloperidol decanoate Injectable, Long Acting 100 milliGRAM(s) IntraMuscular every 4 weeks  insulin glargine Injectable (LANTUS) 30 Unit(s) SubCutaneous at bedtime  insulin lispro (HumaLOG) corrective regimen sliding scale   SubCutaneous at bedtime  insulin lispro (HumaLOG) corrective regimen sliding scale   SubCutaneous three times a day before meals  lisinopril 5 milliGRAM(s) Oral daily  traZODone 75 milliGRAM(s) Oral at bedtime  Haldol Decanoate 100mg IM Q4 weeks  was given on September 26,2018

## 2018-09-25 NOTE — DISCHARGE NOTE BEHAVIORAL HEALTH - CARE PROVIDER_API CALL
[facility], [facility]  Phone: (   )    -  Fax: (   )    -

## 2018-09-25 NOTE — DISCHARGE NOTE BEHAVIORAL HEALTH - NSBHDCDXVALIDYESFT_PSY_A_CORE
Pt initially with mixed manic state with intense inappropriate labile mood and affect , poor sleep, illogical thoughts, loosening of association , paranoid and grandiose delusions. Pt initially with sporadic taking of haldol but later improved mentation with improved compliance with medication.   Pt refused Haldol Decanoate .

## 2018-09-25 NOTE — PROGRESS NOTE BEHAVIORAL HEALTH - DETAILS
stage 4 colon cancer with mets to the liver and lung  as recorded by information from [facility] DM

## 2018-09-25 NOTE — DISCHARGE NOTE BEHAVIORAL HEALTH - CONDITIONS AT DISCHARGE
Pt calm and looking forward to discharge. States that she will comply with follow up care and will continue med regime. Pt calm and looking forward to discharge. States that she will comply with follow up care and will continue med regimen.

## 2018-09-25 NOTE — DISCHARGE NOTE BEHAVIORAL HEALTH - NSBHDCRESPONSEFT_PSY_A_CORE
pt initially with sporadic compliance to take medication .  Pt evidentially willing to take her oral medications  both the medical and the psychiatric medications.  Pt refused to have repeat of the MRI of the abdomin and MRI of the head. Pt verbalized want to resume her medical care at [facility].

## 2018-09-25 NOTE — DISCHARGE NOTE BEHAVIORAL HEALTH - NSBHDCVIOLFCTRMIT_PSY_A_CORE
1. pt with supportive family  2. pt with long term treatment relationship with [facility]  3. pt was willing to continue with the haldol decanoate

## 2018-09-25 NOTE — DISCHARGE NOTE BEHAVIORAL HEALTH - REASON FOR ADMISSION
Pt had stopped her medications and had increased paranoid and grandiose delusions that she had no medical or psychiatric illness and did not need medication

## 2018-09-26 LAB
ALBUMIN SERPL ELPH-MCNC: 3.3 G/DL — SIGNIFICANT CHANGE UP (ref 3.3–5)
ALP SERPL-CCNC: 588 U/L — HIGH (ref 40–120)
ALT FLD-CCNC: 150 U/L — HIGH (ref 12–78)
AST SERPL-CCNC: 62 U/L — HIGH (ref 15–37)
BILIRUB DIRECT SERPL-MCNC: 0.1 MG/DL — SIGNIFICANT CHANGE UP (ref 0–0.2)
BILIRUB INDIRECT FLD-MCNC: 0.2 MG/DL — SIGNIFICANT CHANGE UP (ref 0.2–1)
BILIRUB SERPL-MCNC: 0.3 MG/DL — SIGNIFICANT CHANGE UP (ref 0.2–1.2)
GLUCOSE BLDC GLUCOMTR-MCNC: 265 MG/DL — HIGH (ref 70–99)
GLUCOSE BLDC GLUCOMTR-MCNC: 274 MG/DL — HIGH (ref 70–99)
GLUCOSE BLDC GLUCOMTR-MCNC: 281 MG/DL — HIGH (ref 70–99)
GLUCOSE BLDC GLUCOMTR-MCNC: 302 MG/DL — HIGH (ref 70–99)
PROT SERPL-MCNC: 7.9 GM/DL — SIGNIFICANT CHANGE UP (ref 6–8.3)
VALPROATE SERPL-MCNC: 41 UG/ML — LOW (ref 50–100)

## 2018-09-26 PROCEDURE — 70553 MRI BRAIN STEM W/O & W/DYE: CPT | Mod: 26

## 2018-09-26 PROCEDURE — 74183 MRI ABD W/O CNTR FLWD CNTR: CPT | Mod: 26

## 2018-09-26 RX ORDER — DIVALPROEX SODIUM 500 MG/1
1000 TABLET, DELAYED RELEASE ORAL DAILY
Qty: 0 | Refills: 0 | Status: DISCONTINUED | OUTPATIENT
Start: 2018-09-26 | End: 2018-10-09

## 2018-09-26 RX ORDER — HALOPERIDOL DECANOATE 100 MG/ML
100 INJECTION INTRAMUSCULAR
Qty: 0 | Refills: 0 | Status: DISCONTINUED | OUTPATIENT
Start: 2018-09-26 | End: 2018-10-09

## 2018-09-26 RX ADMIN — Medication 75 MILLIGRAM(S): at 21:31

## 2018-09-26 RX ADMIN — HALOPERIDOL DECANOATE 100 MILLIGRAM(S): 100 INJECTION INTRAMUSCULAR at 12:05

## 2018-09-26 RX ADMIN — Medication 1 MILLIGRAM(S): at 21:31

## 2018-09-26 RX ADMIN — Medication 8: at 17:49

## 2018-09-26 RX ADMIN — ATORVASTATIN CALCIUM 40 MILLIGRAM(S): 80 TABLET, FILM COATED ORAL at 21:31

## 2018-09-26 RX ADMIN — Medication 2: at 21:30

## 2018-09-26 RX ADMIN — INSULIN GLARGINE 30 UNIT(S): 100 INJECTION, SOLUTION SUBCUTANEOUS at 21:30

## 2018-09-26 RX ADMIN — DIVALPROEX SODIUM 750 MILLIGRAM(S): 500 TABLET, DELAYED RELEASE ORAL at 09:00

## 2018-09-26 RX ADMIN — Medication 6: at 12:09

## 2018-09-26 RX ADMIN — LISINOPRIL 5 MILLIGRAM(S): 2.5 TABLET ORAL at 09:00

## 2018-09-26 RX ADMIN — Medication 6: at 08:06

## 2018-09-26 RX ADMIN — HALOPERIDOL DECANOATE 10 MILLIGRAM(S): 100 INJECTION INTRAMUSCULAR at 09:00

## 2018-09-26 RX ADMIN — ENOXAPARIN SODIUM 40 MILLIGRAM(S): 100 INJECTION SUBCUTANEOUS at 09:00

## 2018-09-26 RX ADMIN — Medication 1 MILLIGRAM(S): at 09:00

## 2018-09-26 NOTE — PROGRESS NOTE BEHAVIORAL HEALTH - SUMMARY
52 YOWW with schizoaffective disorder bipolar Type. with colon cancer 4th stage with newly discovered liver mets, presents to ER psychotic, manic , in the light of noncompliance and partial compliance. She gets haldol dec injection and last one was on 8/16/18, but she is not compliant with other meds.   PT is not suicidal or homicidal. But she si disorganized and cannot care for herself.   care coordinated withy ER attending. They are still completing medical workup and will admit pt to medicine. As per FSL pt presents the same way for at least 1 year and the only change in behavior is that she stopped attending her group psychotherapy sessions.       [facility] plan is astrid keep pt on 1;1 and continue haldol dec. next one is due on 9/13/18.  Would do CT head and check ammonia level.     C&L will f/u

## 2018-09-26 NOTE — PROGRESS NOTE BEHAVIORAL HEALTH - NSBHCHARTREVIEWVS_PSY_A_CORE FT
Vital Signs Last 24 Hrs  T(C): 36.7 (26 Sep 2018 08:18), Max: 36.7 (26 Sep 2018 08:18)  T(F): 98 (26 Sep 2018 08:18), Max: 98 (26 Sep 2018 08:18)  HR: 98 (26 Sep 2018 08:18) (98 - 98)  BP: 113/53 (26 Sep 2018 08:18) (113/53 - 113/53)  BP(mean): --  RR: 12 (26 Sep 2018 08:18) (12 - 12)  SpO2: 99% (26 Sep 2018 08:18) (99% - 99%)

## 2018-09-26 NOTE — PROGRESS NOTE BEHAVIORAL HEALTH - NSBHFUPINTERVALHXFT_PSY_A_CORE
Radiology dept finally got the information from [facility] about the in dwelling chemo pump and was willing to do the MRI for the head and abdomin. Pt who along verbalized willing to have the scans has now refused to have the scans.    Pt currently according to nursing is taking her medications. She is goal directed and is aware of the purpose of  the scans and is able to indicated that she is not wanting them done here.   Her family  is requesting a meeting .The patient is now with compliance with medication and is no longer with paranoid delusions, and is denying any suicidal ideation or plan . Pt  should be considered for discharge to resume her treatment at [facility].    Called the  Jewel Silveira [phone]

## 2018-09-26 NOTE — PROGRESS NOTE BEHAVIORAL HEALTH - NSBHCHARTREVIEWIMAGING_PSY_A_CORE FT
CT scan
There is no evidence of intraparenchymal or extraaxial hemorrhage.     There is no CT evidence of large vessel acute infarct. No mass effect is   found in the brain.  No evidence of midline shift or herniation pattern.    The ventricles, sulci and basal cisterns appear unremarkable.         Visualized paranasal sinuses are clear.      IMPRESSION:       No acute intracranial findings.

## 2018-09-26 NOTE — PROGRESS NOTE BEHAVIORAL HEALTH - NSBHFUPINTERVALCCFT_PSY_A_CORE
"I don't want the scan to be done here I want to go back to [facility] . This is a hospital that is not a cancer center . "

## 2018-09-26 NOTE — PROGRESS NOTE BEHAVIORAL HEALTH - DETAILS
stage 4 colon cancer with mets to the liver and lung  as recorded by information from [facility] DM

## 2018-09-27 LAB
GLUCOSE BLDC GLUCOMTR-MCNC: 195 MG/DL — HIGH (ref 70–99)
GLUCOSE BLDC GLUCOMTR-MCNC: 254 MG/DL — HIGH (ref 70–99)
GLUCOSE BLDC GLUCOMTR-MCNC: 327 MG/DL — HIGH (ref 70–99)
GLUCOSE BLDC GLUCOMTR-MCNC: 382 MG/DL — HIGH (ref 70–99)

## 2018-09-27 RX ADMIN — Medication 1 MILLIGRAM(S): at 08:59

## 2018-09-27 RX ADMIN — INSULIN GLARGINE 30 UNIT(S): 100 INJECTION, SOLUTION SUBCUTANEOUS at 20:52

## 2018-09-27 RX ADMIN — Medication 2: at 07:34

## 2018-09-27 RX ADMIN — DIVALPROEX SODIUM 1000 MILLIGRAM(S): 500 TABLET, DELAYED RELEASE ORAL at 08:59

## 2018-09-27 RX ADMIN — ATORVASTATIN CALCIUM 40 MILLIGRAM(S): 80 TABLET, FILM COATED ORAL at 20:52

## 2018-09-27 RX ADMIN — LISINOPRIL 5 MILLIGRAM(S): 2.5 TABLET ORAL at 08:59

## 2018-09-27 RX ADMIN — Medication 2: at 20:53

## 2018-09-27 RX ADMIN — Medication 10: at 17:04

## 2018-09-27 RX ADMIN — Medication 8: at 11:46

## 2018-09-27 RX ADMIN — Medication 75 MILLIGRAM(S): at 20:51

## 2018-09-27 RX ADMIN — HALOPERIDOL DECANOATE 10 MILLIGRAM(S): 100 INJECTION INTRAMUSCULAR at 08:59

## 2018-09-27 RX ADMIN — Medication 1 MILLIGRAM(S): at 20:51

## 2018-09-27 RX ADMIN — ENOXAPARIN SODIUM 40 MILLIGRAM(S): 100 INJECTION SUBCUTANEOUS at 09:00

## 2018-09-27 NOTE — CHART NOTE - NSCHARTNOTEFT_GEN_A_CORE
Spoke with Assistant Berry ORTEGA who had questions about provided radiology records to patient's  at patient's request.  Provided nurse with a release for patient to sign to release the records and to release them to her .  Also called Nursing administration to obtain permission as this is a special circumstance given patient's medical and psychiatric condition.  Was given permission to go forward with this request by administration.

## 2018-09-27 NOTE — PROGRESS NOTE BEHAVIORAL HEALTH - NSBHFUPINTERVALHXFT_PSY_A_CORE
Results of the head and the abdominal MRI were forwarded to [facility] and CSW is working with the  to get follow up with them.  Pt still compliant with her oral medications and has had the haldol decanoate 100mg IM yesterday. Pt acknowledging her medial issues although still not believing that her  is really her current .

## 2018-09-27 NOTE — PROGRESS NOTE BEHAVIORAL HEALTH - DETAILS
stage 4 colon cancer with mets to the liver and lung  as recorded by information from [facility] DM

## 2018-09-27 NOTE — PROGRESS NOTE BEHAVIORAL HEALTH - SUMMARY
52 YOWW with schizoaffective disorder bipolar Type. with colon cancer 4th stage with newly discovered liver mets, presents to ER psychotic, manic , in the light of noncompliance and partial compliance. She gets haldol dec injection and last one was on 8/16/18, but she is not compliant with other meds.   PT is not suicidal or homicidal. But she si disorganized and cannot care for herself.   care coordinated withy ER attending. They are still completing medical workup and will admit pt to medicine. As per FSL pt presents the same way for at least 1 year and the only change in behavior is that she stopped attending her group psychotherapy sessions.       [facility] plan is astrid keep pt on 1;1 and continue haldol dec. next one is due on 9/13/18.  Would do CT head and check ammonia level.     C&L will f/u

## 2018-09-28 LAB
GLUCOSE BLDC GLUCOMTR-MCNC: 171 MG/DL — HIGH (ref 70–99)
GLUCOSE BLDC GLUCOMTR-MCNC: 179 MG/DL — HIGH (ref 70–99)
GLUCOSE BLDC GLUCOMTR-MCNC: 191 MG/DL — HIGH (ref 70–99)
GLUCOSE BLDC GLUCOMTR-MCNC: 199 MG/DL — HIGH (ref 70–99)

## 2018-09-28 RX ADMIN — Medication 2: at 16:41

## 2018-09-28 RX ADMIN — ATORVASTATIN CALCIUM 40 MILLIGRAM(S): 80 TABLET, FILM COATED ORAL at 21:52

## 2018-09-28 RX ADMIN — Medication 1 MILLIGRAM(S): at 09:51

## 2018-09-28 RX ADMIN — LISINOPRIL 5 MILLIGRAM(S): 2.5 TABLET ORAL at 09:51

## 2018-09-28 RX ADMIN — Medication 1 MILLIGRAM(S): at 21:52

## 2018-09-28 RX ADMIN — DIVALPROEX SODIUM 1000 MILLIGRAM(S): 500 TABLET, DELAYED RELEASE ORAL at 09:51

## 2018-09-28 RX ADMIN — ENOXAPARIN SODIUM 40 MILLIGRAM(S): 100 INJECTION SUBCUTANEOUS at 09:52

## 2018-09-28 RX ADMIN — INSULIN GLARGINE 30 UNIT(S): 100 INJECTION, SOLUTION SUBCUTANEOUS at 21:55

## 2018-09-28 RX ADMIN — HALOPERIDOL DECANOATE 10 MILLIGRAM(S): 100 INJECTION INTRAMUSCULAR at 09:51

## 2018-09-28 RX ADMIN — Medication 2: at 12:11

## 2018-09-28 RX ADMIN — Medication 2: at 08:28

## 2018-09-28 RX ADMIN — Medication 75 MILLIGRAM(S): at 21:53

## 2018-09-28 NOTE — PROGRESS NOTE BEHAVIORAL HEALTH - NSBHFUPINTERVALHXFT_PSY_A_CORE
Pt needing to have aftercare treatment and PHP at [facility] is refusing due to her having medical issues.        Results of the head and the abdominal MRI were forwarded to [facility] and CSW is working with the  to get follow up with them.  Pt still compliant with her oral medications and has had the haldol decanoate 100mg IM yesterday. Pt acknowledging her medial issues although still not believing that her  is really her current .

## 2018-09-28 NOTE — PROGRESS NOTE BEHAVIORAL HEALTH - DETAILS
stage 4 colon cancer with mets to the liver and lung  as recorded by information from [facility] DM

## 2018-09-28 NOTE — PROGRESS NOTE BEHAVIORAL HEALTH - SUMMARY
52 YOWW with schizoaffective disorder bipolar Type. with colon cancer 4th stage with newly discovered liver mets, presents to ER psychotic, manic , in the light of noncompliance and partial compliance. She gets haldol dec injection and last one was on 8/16/18, but she is not compliant with other meds.   PT is not suicidal or homicidal. But she si disorganized and cannot care for herself.   care coordinated withy ER attending. They are still completing medical workup and will admit pt to medicine. As per FSL pt presents the same way for at least 1 year and the only change in behavior is that she stopped attending her group psychotherapy sessions.       [facility] plan is astrid keep pt on 1;1 and continue haldol dec. next one is due on 9/13/18.  Would do CT head and check ammonia level.     C&L will f/u

## 2018-09-29 LAB
GLUCOSE BLDC GLUCOMTR-MCNC: 223 MG/DL — HIGH (ref 70–99)
GLUCOSE BLDC GLUCOMTR-MCNC: 244 MG/DL — HIGH (ref 70–99)
GLUCOSE BLDC GLUCOMTR-MCNC: 249 MG/DL — HIGH (ref 70–99)

## 2018-09-29 RX ADMIN — Medication 0: at 21:06

## 2018-09-29 RX ADMIN — Medication 4: at 17:26

## 2018-09-29 RX ADMIN — DIVALPROEX SODIUM 1000 MILLIGRAM(S): 500 TABLET, DELAYED RELEASE ORAL at 09:21

## 2018-09-29 RX ADMIN — Medication 75 MILLIGRAM(S): at 21:06

## 2018-09-29 RX ADMIN — ENOXAPARIN SODIUM 40 MILLIGRAM(S): 100 INJECTION SUBCUTANEOUS at 13:14

## 2018-09-29 RX ADMIN — INSULIN GLARGINE 30 UNIT(S): 100 INJECTION, SOLUTION SUBCUTANEOUS at 21:07

## 2018-09-29 RX ADMIN — Medication 1 MILLIGRAM(S): at 21:06

## 2018-09-29 RX ADMIN — HALOPERIDOL DECANOATE 10 MILLIGRAM(S): 100 INJECTION INTRAMUSCULAR at 09:21

## 2018-09-29 RX ADMIN — Medication 4: at 09:19

## 2018-09-29 RX ADMIN — Medication 1 MILLIGRAM(S): at 09:21

## 2018-09-30 LAB
GLUCOSE BLDC GLUCOMTR-MCNC: 140 MG/DL — HIGH (ref 70–99)
GLUCOSE BLDC GLUCOMTR-MCNC: 181 MG/DL — HIGH (ref 70–99)
GLUCOSE BLDC GLUCOMTR-MCNC: 202 MG/DL — HIGH (ref 70–99)
GLUCOSE BLDC GLUCOMTR-MCNC: 249 MG/DL — HIGH (ref 70–99)

## 2018-09-30 RX ADMIN — Medication 1 MILLIGRAM(S): at 09:08

## 2018-09-30 RX ADMIN — ENOXAPARIN SODIUM 40 MILLIGRAM(S): 100 INJECTION SUBCUTANEOUS at 09:08

## 2018-09-30 RX ADMIN — Medication 75 MILLIGRAM(S): at 20:51

## 2018-09-30 RX ADMIN — Medication 4: at 17:17

## 2018-09-30 RX ADMIN — Medication 1 MILLIGRAM(S): at 20:51

## 2018-09-30 RX ADMIN — Medication 2: at 08:41

## 2018-09-30 RX ADMIN — ATORVASTATIN CALCIUM 40 MILLIGRAM(S): 80 TABLET, FILM COATED ORAL at 20:51

## 2018-09-30 RX ADMIN — Medication 4: at 12:06

## 2018-09-30 RX ADMIN — DIVALPROEX SODIUM 1000 MILLIGRAM(S): 500 TABLET, DELAYED RELEASE ORAL at 09:07

## 2018-09-30 RX ADMIN — HALOPERIDOL DECANOATE 10 MILLIGRAM(S): 100 INJECTION INTRAMUSCULAR at 09:07

## 2018-10-01 LAB
ALBUMIN SERPL ELPH-MCNC: 2.9 G/DL — LOW (ref 3.3–5)
ALP SERPL-CCNC: 522 U/L — HIGH (ref 40–120)
ALT FLD-CCNC: 96 U/L — HIGH (ref 12–78)
ANION GAP SERPL CALC-SCNC: 8 MMOL/L — SIGNIFICANT CHANGE UP (ref 5–17)
AST SERPL-CCNC: 44 U/L — HIGH (ref 15–37)
BASOPHILS # BLD AUTO: 0.02 K/UL — SIGNIFICANT CHANGE UP (ref 0–0.2)
BASOPHILS NFR BLD AUTO: 0.4 % — SIGNIFICANT CHANGE UP (ref 0–2)
BILIRUB SERPL-MCNC: 0.3 MG/DL — SIGNIFICANT CHANGE UP (ref 0.2–1.2)
BUN SERPL-MCNC: 9 MG/DL — SIGNIFICANT CHANGE UP (ref 7–23)
CALCIUM SERPL-MCNC: 9.1 MG/DL — SIGNIFICANT CHANGE UP (ref 8.5–10.1)
CHLORIDE SERPL-SCNC: 104 MMOL/L — SIGNIFICANT CHANGE UP (ref 96–108)
CO2 SERPL-SCNC: 28 MMOL/L — SIGNIFICANT CHANGE UP (ref 22–31)
CREAT SERPL-MCNC: 0.52 MG/DL — SIGNIFICANT CHANGE UP (ref 0.5–1.3)
EOSINOPHIL # BLD AUTO: 0.11 K/UL — SIGNIFICANT CHANGE UP (ref 0–0.5)
EOSINOPHIL NFR BLD AUTO: 2.5 % — SIGNIFICANT CHANGE UP (ref 0–6)
GLUCOSE BLDC GLUCOMTR-MCNC: 156 MG/DL — HIGH (ref 70–99)
GLUCOSE BLDC GLUCOMTR-MCNC: 158 MG/DL — HIGH (ref 70–99)
GLUCOSE BLDC GLUCOMTR-MCNC: 233 MG/DL — HIGH (ref 70–99)
GLUCOSE BLDC GLUCOMTR-MCNC: 258 MG/DL — HIGH (ref 70–99)
GLUCOSE SERPL-MCNC: 163 MG/DL — HIGH (ref 70–99)
HCT VFR BLD CALC: 34.7 % — SIGNIFICANT CHANGE UP (ref 34.5–45)
HGB BLD-MCNC: 11.4 G/DL — LOW (ref 11.5–15.5)
IMM GRANULOCYTES NFR BLD AUTO: 0.9 % — SIGNIFICANT CHANGE UP (ref 0–1.5)
LYMPHOCYTES # BLD AUTO: 1.44 K/UL — SIGNIFICANT CHANGE UP (ref 1–3.3)
LYMPHOCYTES # BLD AUTO: 32.4 % — SIGNIFICANT CHANGE UP (ref 13–44)
MCHC RBC-ENTMCNC: 30.3 PG — SIGNIFICANT CHANGE UP (ref 27–34)
MCHC RBC-ENTMCNC: 32.9 GM/DL — SIGNIFICANT CHANGE UP (ref 32–36)
MCV RBC AUTO: 92.3 FL — SIGNIFICANT CHANGE UP (ref 80–100)
MONOCYTES # BLD AUTO: 0.36 K/UL — SIGNIFICANT CHANGE UP (ref 0–0.9)
MONOCYTES NFR BLD AUTO: 8.1 % — SIGNIFICANT CHANGE UP (ref 2–14)
NEUTROPHILS # BLD AUTO: 2.48 K/UL — SIGNIFICANT CHANGE UP (ref 1.8–7.4)
NEUTROPHILS NFR BLD AUTO: 55.7 % — SIGNIFICANT CHANGE UP (ref 43–77)
NRBC # BLD: 0 /100 WBCS — SIGNIFICANT CHANGE UP (ref 0–0)
PLATELET # BLD AUTO: 172 K/UL — SIGNIFICANT CHANGE UP (ref 150–400)
POTASSIUM SERPL-MCNC: 3.8 MMOL/L — SIGNIFICANT CHANGE UP (ref 3.5–5.3)
POTASSIUM SERPL-SCNC: 3.8 MMOL/L — SIGNIFICANT CHANGE UP (ref 3.5–5.3)
PROT SERPL-MCNC: 7.2 GM/DL — SIGNIFICANT CHANGE UP (ref 6–8.3)
RBC # BLD: 3.76 M/UL — LOW (ref 3.8–5.2)
RBC # FLD: 13.6 % — SIGNIFICANT CHANGE UP (ref 10.3–14.5)
SODIUM SERPL-SCNC: 140 MMOL/L — SIGNIFICANT CHANGE UP (ref 135–145)
WBC # BLD: 4.45 K/UL — SIGNIFICANT CHANGE UP (ref 3.8–10.5)
WBC # FLD AUTO: 4.45 K/UL — SIGNIFICANT CHANGE UP (ref 3.8–10.5)

## 2018-10-01 PROCEDURE — 93010 ELECTROCARDIOGRAM REPORT: CPT

## 2018-10-01 RX ADMIN — Medication 6: at 12:07

## 2018-10-01 RX ADMIN — Medication 75 MILLIGRAM(S): at 21:38

## 2018-10-01 RX ADMIN — Medication 1 MILLIGRAM(S): at 21:38

## 2018-10-01 RX ADMIN — Medication 4: at 16:39

## 2018-10-01 RX ADMIN — ATORVASTATIN CALCIUM 40 MILLIGRAM(S): 80 TABLET, FILM COATED ORAL at 21:40

## 2018-10-01 RX ADMIN — HALOPERIDOL DECANOATE 10 MILLIGRAM(S): 100 INJECTION INTRAMUSCULAR at 09:17

## 2018-10-01 RX ADMIN — Medication 1 MILLIGRAM(S): at 09:16

## 2018-10-01 RX ADMIN — INSULIN GLARGINE 30 UNIT(S): 100 INJECTION, SOLUTION SUBCUTANEOUS at 21:39

## 2018-10-01 RX ADMIN — ENOXAPARIN SODIUM 40 MILLIGRAM(S): 100 INJECTION SUBCUTANEOUS at 09:16

## 2018-10-01 RX ADMIN — DIVALPROEX SODIUM 1000 MILLIGRAM(S): 500 TABLET, DELAYED RELEASE ORAL at 09:16

## 2018-10-01 NOTE — PROGRESS NOTE BEHAVIORAL HEALTH - SUMMARY
52 YOWW with schizoaffective disorder bipolar Type. with colon cancer 4th stage with newly discovered liver mets, presents to ER psychotic, manic , in the light of noncompliance and partial compliance. She gets haldol dec injection and last one was on 8/16/18, but she is not compliant with other meds.   PT is not suicidal or homicidal. But she si disorganized and cannot care for herself.   care coordinated withy ER attending. They are still completing medical workup and will admit pt to medicine. As per FSL pt presents the same way for at least 1 year and the only change in behavior is that she stopped attending her group psychotherapy sessions.       [facility] plan is astrid keep pt on 1;1 and continue haldol dec. next one is due on 9/13/18.  Would do CT head and check ammonia level.     C&L will f/u

## 2018-10-01 NOTE — PROGRESS NOTE BEHAVIORAL HEALTH - NSBHCHARTREVIEWLAB_PSY_A_CORE FT
Labs personally reviewed:    POCT  Blood Glucose (09.12.18 @ 07:45)  POCT Blood Glucose.: 266 mg/dL    Comprehensive Metabolic Panel in AM (09.09.18 @ 05:24)    Sodium, Serum: 139 mmol/L    Potassium, Serum: 3.6 mmol/L    Chloride, Serum: 109 mmol/L    Carbon Dioxide, Serum: 17 mmol/L    Anion Gap, Serum: 13 mmol/L    Blood Urea Nitrogen, Serum: 7 mg/dL    Creatinine, Serum: 0.55 mg/dL    Glucose, Serum: 289 mg/dL    Calcium, Total Serum: 9.2 mg/dL    Protein Total, Serum: 7.5 gm/dL    Albumin, Serum: 3.0 g/dL    Bilirubin Total, Serum: 0.4 mg/dL    Alkaline Phosphatase, Serum: 349 U/L    Aspartate Aminotransferase (AST/SGOT): 56 U/L    Alanine Aminotransferase (ALT/SGPT): 96 U/L
13 Sep 2018 13:07    136    |  101    |  9      ----------------------------<  394    3.9     |  25     |  0.76     Ca    9.7        13 Sep 2018 13:07                          14.1   5.10  )-----------( 230      ( 13 Sep 2018 13:07 )             40.6   Hemoglobin A1C, Whole Blood (09.08.18 @ 07:10)    Hemoglobin A1C, Whole Blood: 13.9: Method: Immunoassay       Reference Range                4.0-5.6%       High risk (prediabetic)        5.7-6.4%       Diabetic, diagnostic             >=6.5%       ADA diabetic treatment goal       <7.0%
POCT  Blood Glucose (09.30.18 @ 17:15)    POCT Blood Glucose.: 202 mg/dL    Valproic Acid Level, Serum: 41 ug/mL (09.26.18 @ 08:06)  Carcinoembryonic Antigen: 4.4: METHOD: Roche EIA   The CEA assay should not be used as a cancer screening test. Serum CEA  concentrations should only be used in conjunction with   information available from the clinical evaluation of the patien and  from other diagnostic procedures.   CEA Normal Ranges   _________________   Non-smoker: less than 3.9 ng/mL       Smoker: less than 5.5 ng/mL ng/mL (09.25.18 @ 06:59)
will attempt to get updated cbc and cmp
will order for new labs

## 2018-10-01 NOTE — PROGRESS NOTE BEHAVIORAL HEALTH - DETAILS
stage 4 colon cancer with mets to the liver and lung  as recorded by information from [facility] DM

## 2018-10-01 NOTE — PROGRESS NOTE BEHAVIORAL HEALTH - NS ED BHA REVIEW OF ED CHART AVAILABLE IMAGING REVIEWED
Yes
None available
Yes
None available
Yes
None available

## 2018-10-01 NOTE — PROGRESS NOTE BEHAVIORAL HEALTH - NSBHFUPINTERVALHXFT_PSY_A_CORE
Pt continues to be compliant with her medical and psychiatric medications.  Pt with repeat of the cbc and sma, and the ekg . Pt with follow up at [facility] .  Pt with continued behavior control and is still mildly guarded but is not with paranoid delusions of being unsafe from other people .  Pt does acknowledge that her current  is her   and not an ex  as she was believing in the recent past.  Pt is also denying any suicidal ideation or plan . She is also denying any homicidal ideation .

## 2018-10-02 LAB
GLUCOSE BLDC GLUCOMTR-MCNC: 149 MG/DL — HIGH (ref 70–99)
GLUCOSE BLDC GLUCOMTR-MCNC: 261 MG/DL — HIGH (ref 70–99)
GLUCOSE BLDC GLUCOMTR-MCNC: 276 MG/DL — HIGH (ref 70–99)
GLUCOSE BLDC GLUCOMTR-MCNC: 285 MG/DL — HIGH (ref 70–99)

## 2018-10-02 RX ADMIN — Medication 6: at 17:30

## 2018-10-02 RX ADMIN — Medication 6: at 12:10

## 2018-10-02 RX ADMIN — Medication 1 MILLIGRAM(S): at 09:35

## 2018-10-02 RX ADMIN — Medication 1 MILLIGRAM(S): at 21:33

## 2018-10-02 RX ADMIN — Medication 2: at 21:32

## 2018-10-02 RX ADMIN — DIVALPROEX SODIUM 1000 MILLIGRAM(S): 500 TABLET, DELAYED RELEASE ORAL at 09:36

## 2018-10-02 RX ADMIN — INSULIN GLARGINE 30 UNIT(S): 100 INJECTION, SOLUTION SUBCUTANEOUS at 21:33

## 2018-10-02 RX ADMIN — ATORVASTATIN CALCIUM 40 MILLIGRAM(S): 80 TABLET, FILM COATED ORAL at 21:35

## 2018-10-02 RX ADMIN — HALOPERIDOL DECANOATE 10 MILLIGRAM(S): 100 INJECTION INTRAMUSCULAR at 09:36

## 2018-10-02 RX ADMIN — LISINOPRIL 5 MILLIGRAM(S): 2.5 TABLET ORAL at 09:36

## 2018-10-02 RX ADMIN — Medication 75 MILLIGRAM(S): at 21:34

## 2018-10-02 NOTE — PROGRESS NOTE BEHAVIORAL HEALTH - SUMMARY
52 YOWW with schizoaffective disorder bipolar Type. with colon cancer 4th stage with newly discovered liver mets, presents to ER psychotic, manic , in the light of noncompliance and partial compliance. She gets haldol dec injection and last one was on 8/16/18, but she is not compliant with other meds.   PT is not suicidal or homicidal. But she si disorganized and cannot care for herself.   care coordinated withy ER attending. They are still completing medical workup and will admit pt to medicine. As per FSL pt presents the same way for at least 1 year and the only change in behavior is that she stopped attending her group psychotherapy sessions.       [facility] plan is astrid keep pt on 1;1 and continue haldol dec. next one is due on 9/13/18.  Would do CT head and check ammonia level.     C&L will f/u

## 2018-10-02 NOTE — PROGRESS NOTE BEHAVIORAL HEALTH - NSBHFUPINTERVALHXFT_PSY_A_CORE
Pt at this time has had [facility] change her scheduled meeting with them to two weeks down with the CT/PET to be done in a week before they would consider setting up a treatment plan.  Pt with much frustration as she was and still would like to go home.  Pt has complied with the medication compliance and even allowed a MRI , and took the haldol decanoate.  Pt with frequent asking to be going home.    Spoke with the CSW who spoke with the  who indicated that he may be able to have pt come home tomorrow, but this is tentative as in the past the family did promise but then did not carry through.   Spoke with palliative care who suggested that if [facility] is willing to continue with the treatment that the palliative care from [facility] should be notified and be in contact with the family as to not split the treatment between two facilities.

## 2018-10-03 LAB
GLUCOSE BLDC GLUCOMTR-MCNC: 136 MG/DL — HIGH (ref 70–99)
GLUCOSE BLDC GLUCOMTR-MCNC: 160 MG/DL — HIGH (ref 70–99)
GLUCOSE BLDC GLUCOMTR-MCNC: 167 MG/DL — HIGH (ref 70–99)
GLUCOSE BLDC GLUCOMTR-MCNC: 177 MG/DL — HIGH (ref 70–99)

## 2018-10-03 RX ADMIN — Medication 2: at 12:08

## 2018-10-03 RX ADMIN — ENOXAPARIN SODIUM 40 MILLIGRAM(S): 100 INJECTION SUBCUTANEOUS at 09:00

## 2018-10-03 RX ADMIN — Medication 1 MILLIGRAM(S): at 09:00

## 2018-10-03 RX ADMIN — Medication 75 MILLIGRAM(S): at 20:39

## 2018-10-03 RX ADMIN — INSULIN GLARGINE 30 UNIT(S): 100 INJECTION, SOLUTION SUBCUTANEOUS at 20:37

## 2018-10-03 RX ADMIN — HALOPERIDOL DECANOATE 10 MILLIGRAM(S): 100 INJECTION INTRAMUSCULAR at 09:00

## 2018-10-03 RX ADMIN — Medication 1 MILLIGRAM(S): at 20:40

## 2018-10-03 RX ADMIN — ATORVASTATIN CALCIUM 40 MILLIGRAM(S): 80 TABLET, FILM COATED ORAL at 20:39

## 2018-10-03 RX ADMIN — DIVALPROEX SODIUM 1000 MILLIGRAM(S): 500 TABLET, DELAYED RELEASE ORAL at 09:00

## 2018-10-03 RX ADMIN — Medication 2: at 17:43

## 2018-10-03 NOTE — PROGRESS NOTE BEHAVIORAL HEALTH - NSBHCHARTREVIEWVS_PSY_A_CORE FT
Vital Signs Last 24 Hrs  T(C): 36.8 (03 Oct 2018 09:08), Max: 36.8 (03 Oct 2018 09:08)  T(F): 98.3 (03 Oct 2018 09:08), Max: 98.3 (03 Oct 2018 09:08)  HR: 79 (03 Oct 2018 09:08) (79 - 79)  BP: 95/58 (03 Oct 2018 09:08) (95/58 - 95/58)  BP(mean): --  RR: 14 (03 Oct 2018 09:08) (14 - 14)  SpO2: 100% (03 Oct 2018 09:08) (100% - 100%)

## 2018-10-03 NOTE — PROGRESS NOTE BEHAVIORAL HEALTH - SUMMARY
52 YOWW with schizoaffective disorder bipolar Type. with colon cancer 4th stage with newly discovered liver mets, presents to ER psychotic, manic , in the light of noncompliance and partial compliance. She gets haldol dec injection and last one was on 8/16/18, but she is not compliant with other meds.   PT is not suicidal or homicidal. But she si disorganized and cannot care for herself.   care coordinated withy ER attending. They are still completing medical workup and will admit pt to medicine. As per FSL pt presents the same way for at least 1 year and the only change in behavior is that she stopped attending her group psychotherapy sessions.       [facility] plan is astrid keep pt on 1;1 and continue haldol dec. next one is due on 9/13/18.  Would do CT head and check ammonia level.     C&L will f/u

## 2018-10-03 NOTE — PROGRESS NOTE BEHAVIORAL HEALTH - DETAILS
stage 4 colon cancer with mets to the liver and lung  as recorded by information from [facility] DM

## 2018-10-03 NOTE — PROGRESS NOTE BEHAVIORAL HEALTH - NSBHFUPINTERVALHXFT_PSY_A_CORE
Pt with attempt to continue with the attending of the groups.  Pt with less time in her room .  Pt still frustrated with the continued stay at this hospital .  Pt has been compliant with the medication and of the blood monitoring .  Pt with continued hope that she would be able to restart the treatment at [place] .

## 2018-10-04 LAB
GLUCOSE BLDC GLUCOMTR-MCNC: 126 MG/DL — HIGH (ref 70–99)
GLUCOSE BLDC GLUCOMTR-MCNC: 154 MG/DL — HIGH (ref 70–99)

## 2018-10-04 RX ADMIN — ATORVASTATIN CALCIUM 40 MILLIGRAM(S): 80 TABLET, FILM COATED ORAL at 20:43

## 2018-10-04 RX ADMIN — INSULIN GLARGINE 30 UNIT(S): 100 INJECTION, SOLUTION SUBCUTANEOUS at 20:43

## 2018-10-04 RX ADMIN — Medication 1 MILLIGRAM(S): at 10:47

## 2018-10-04 RX ADMIN — LISINOPRIL 5 MILLIGRAM(S): 2.5 TABLET ORAL at 10:48

## 2018-10-04 RX ADMIN — DIVALPROEX SODIUM 1000 MILLIGRAM(S): 500 TABLET, DELAYED RELEASE ORAL at 10:48

## 2018-10-04 RX ADMIN — Medication 75 MILLIGRAM(S): at 20:42

## 2018-10-04 RX ADMIN — HALOPERIDOL DECANOATE 10 MILLIGRAM(S): 100 INJECTION INTRAMUSCULAR at 10:48

## 2018-10-04 RX ADMIN — Medication 1 MILLIGRAM(S): at 20:43

## 2018-10-04 NOTE — PROGRESS NOTE BEHAVIORAL HEALTH - NSBHFUPINTERVALHXFT_PSY_A_CORE
Still no word about her going home.  Pt is in behavioral; control and is compliant with the medication .  Pt attending groups.  Pt's family still resistent to get pt home with aide .

## 2018-10-04 NOTE — PROGRESS NOTE BEHAVIORAL HEALTH - NSBHCHARTREVIEWVS_PSY_A_CORE FT
Vital Signs Last 24 Hrs  T(C): 36.8 (04 Oct 2018 08:56), Max: 36.8 (04 Oct 2018 08:56)  T(F): 98.3 (04 Oct 2018 08:56), Max: 98.3 (04 Oct 2018 08:56)  HR: 81 (04 Oct 2018 08:56) (81 - 81)  BP: 108/64 (04 Oct 2018 08:56) (108/64 - 108/64)  BP(mean): --  RR: 12 (04 Oct 2018 08:56) (12 - 12)  SpO2: 99% (04 Oct 2018 08:56) (99% - 99%)

## 2018-10-04 NOTE — PROGRESS NOTE BEHAVIORAL HEALTH - SUMMARY
52 YOWW with schizoaffective disorder bipolar Type. with colon cancer 4th stage with newly discovered liver mets, presents to ER psychotic, manic , in the light of noncompliance and partial compliance. She gets haldol dec injection and last one was on 8/16/18, but she is not compliant with other meds.   PT is not suicidal or homicidal. But she si disorganized and cannot care for herself.   care coordinated withy ER attending. They are still completing medical workup and will admit pt to medicine. As per FSL pt presents the same way for at least 1 year and the only change in behavior is that she stopped attending her group psychotherapy sessions.       [facility] plan is astrid keep pt on 1;1 and continue haldol dec. next one is due on 9/13/18.  Would do CT head and check ammonia level.     C&L will f/u

## 2018-10-05 LAB
GLUCOSE BLDC GLUCOMTR-MCNC: 139 MG/DL — HIGH (ref 70–99)
GLUCOSE BLDC GLUCOMTR-MCNC: 145 MG/DL — HIGH (ref 70–99)
GLUCOSE BLDC GLUCOMTR-MCNC: 157 MG/DL — HIGH (ref 70–99)

## 2018-10-05 RX ADMIN — LISINOPRIL 5 MILLIGRAM(S): 2.5 TABLET ORAL at 08:39

## 2018-10-05 RX ADMIN — Medication 75 MILLIGRAM(S): at 20:35

## 2018-10-05 RX ADMIN — Medication 1 MILLIGRAM(S): at 08:39

## 2018-10-05 RX ADMIN — ATORVASTATIN CALCIUM 40 MILLIGRAM(S): 80 TABLET, FILM COATED ORAL at 20:35

## 2018-10-05 RX ADMIN — DIVALPROEX SODIUM 1000 MILLIGRAM(S): 500 TABLET, DELAYED RELEASE ORAL at 08:39

## 2018-10-05 RX ADMIN — Medication 1 MILLIGRAM(S): at 20:35

## 2018-10-05 RX ADMIN — HALOPERIDOL DECANOATE 10 MILLIGRAM(S): 100 INJECTION INTRAMUSCULAR at 08:39

## 2018-10-05 RX ADMIN — ENOXAPARIN SODIUM 40 MILLIGRAM(S): 100 INJECTION SUBCUTANEOUS at 08:39

## 2018-10-05 RX ADMIN — INSULIN GLARGINE 30 UNIT(S): 100 INJECTION, SOLUTION SUBCUTANEOUS at 21:24

## 2018-10-05 NOTE — PROGRESS NOTE BEHAVIORAL HEALTH - DETAILS
DM stage 4 colon cancer with mets to the liver and lung  as recorded by information from [facility]

## 2018-10-05 NOTE — PROGRESS NOTE BEHAVIORAL HEALTH - NSBHFUPINTERVALHXFT_PSY_A_CORE
Pt with goal directed speech.  Pt with compliance with medication .  Pt awaiting the PET scan from [facility] and family is needing to get aide for the pt .  Hopefully the pt can go home on tuesday but still not clear with the family.

## 2018-10-05 NOTE — PROGRESS NOTE BEHAVIORAL HEALTH - SUMMARY
52 YOWW with schizoaffective disorder bipolar Type. with colon cancer 4th stage with newly discovered liver mets, presents to ER psychotic, manic , in the light of noncompliance and partial compliance. She gets haldol dec injection and last one was on 8/16/18, but she is not compliant with other meds.   PT is not suicidal or homicidal. But she si disorganized and cannot care for herself.   care coordinated withy ER attending. They are still completing medical workup and will admit pt to medicine. As per FSL pt presents the same way for at least 1 year and the only change in behavior is that she stopped attending her group psychotherapy sessions.       [facility] plan is astrid keep pt on 1;1 and continue haldol dec. next one is due on 9/13/18.  Would do CT head and check ammonia level.     C&L will f/u

## 2018-10-06 LAB
GLUCOSE BLDC GLUCOMTR-MCNC: 133 MG/DL — HIGH (ref 70–99)
GLUCOSE BLDC GLUCOMTR-MCNC: 162 MG/DL — HIGH (ref 70–99)

## 2018-10-06 RX ADMIN — ATORVASTATIN CALCIUM 40 MILLIGRAM(S): 80 TABLET, FILM COATED ORAL at 20:58

## 2018-10-06 RX ADMIN — LISINOPRIL 5 MILLIGRAM(S): 2.5 TABLET ORAL at 09:36

## 2018-10-06 RX ADMIN — HALOPERIDOL DECANOATE 10 MILLIGRAM(S): 100 INJECTION INTRAMUSCULAR at 09:36

## 2018-10-06 RX ADMIN — Medication 1 MILLIGRAM(S): at 09:35

## 2018-10-06 RX ADMIN — Medication 2: at 12:19

## 2018-10-06 RX ADMIN — Medication 1 MILLIGRAM(S): at 20:58

## 2018-10-06 RX ADMIN — INSULIN GLARGINE 30 UNIT(S): 100 INJECTION, SOLUTION SUBCUTANEOUS at 20:57

## 2018-10-06 RX ADMIN — DIVALPROEX SODIUM 1000 MILLIGRAM(S): 500 TABLET, DELAYED RELEASE ORAL at 09:35

## 2018-10-06 RX ADMIN — Medication 4: at 17:39

## 2018-10-06 RX ADMIN — Medication 75 MILLIGRAM(S): at 20:58

## 2018-10-07 LAB
GLUCOSE BLDC GLUCOMTR-MCNC: 107 MG/DL — HIGH (ref 70–99)
GLUCOSE BLDC GLUCOMTR-MCNC: 125 MG/DL — HIGH (ref 70–99)
GLUCOSE BLDC GLUCOMTR-MCNC: 141 MG/DL — HIGH (ref 70–99)
GLUCOSE BLDC GLUCOMTR-MCNC: 148 MG/DL — HIGH (ref 70–99)
GLUCOSE BLDC GLUCOMTR-MCNC: 154 MG/DL — HIGH (ref 70–99)
GLUCOSE BLDC GLUCOMTR-MCNC: 202 MG/DL — HIGH (ref 70–99)

## 2018-10-07 RX ADMIN — INSULIN GLARGINE 30 UNIT(S): 100 INJECTION, SOLUTION SUBCUTANEOUS at 20:47

## 2018-10-07 RX ADMIN — Medication 75 MILLIGRAM(S): at 20:47

## 2018-10-07 RX ADMIN — ENOXAPARIN SODIUM 40 MILLIGRAM(S): 100 INJECTION SUBCUTANEOUS at 09:30

## 2018-10-07 RX ADMIN — LISINOPRIL 5 MILLIGRAM(S): 2.5 TABLET ORAL at 09:30

## 2018-10-07 RX ADMIN — HALOPERIDOL DECANOATE 10 MILLIGRAM(S): 100 INJECTION INTRAMUSCULAR at 09:30

## 2018-10-07 RX ADMIN — ATORVASTATIN CALCIUM 40 MILLIGRAM(S): 80 TABLET, FILM COATED ORAL at 20:47

## 2018-10-07 RX ADMIN — Medication 1 MILLIGRAM(S): at 09:29

## 2018-10-07 RX ADMIN — Medication 2: at 12:47

## 2018-10-07 RX ADMIN — Medication 1 MILLIGRAM(S): at 20:47

## 2018-10-07 RX ADMIN — DIVALPROEX SODIUM 1000 MILLIGRAM(S): 500 TABLET, DELAYED RELEASE ORAL at 09:29

## 2018-10-08 LAB
GLUCOSE BLDC GLUCOMTR-MCNC: 114 MG/DL — HIGH (ref 70–99)
GLUCOSE BLDC GLUCOMTR-MCNC: 116 MG/DL — HIGH (ref 70–99)
GLUCOSE BLDC GLUCOMTR-MCNC: 124 MG/DL — HIGH (ref 70–99)
GLUCOSE BLDC GLUCOMTR-MCNC: 126 MG/DL — HIGH (ref 70–99)
GLUCOSE BLDC GLUCOMTR-MCNC: 95 MG/DL — SIGNIFICANT CHANGE UP (ref 70–99)

## 2018-10-08 RX ADMIN — HALOPERIDOL DECANOATE 10 MILLIGRAM(S): 100 INJECTION INTRAMUSCULAR at 09:31

## 2018-10-08 RX ADMIN — DIVALPROEX SODIUM 1000 MILLIGRAM(S): 500 TABLET, DELAYED RELEASE ORAL at 09:34

## 2018-10-08 RX ADMIN — Medication 1 MILLIGRAM(S): at 21:12

## 2018-10-08 RX ADMIN — ATORVASTATIN CALCIUM 40 MILLIGRAM(S): 80 TABLET, FILM COATED ORAL at 21:13

## 2018-10-08 RX ADMIN — Medication 1 MILLIGRAM(S): at 09:30

## 2018-10-08 RX ADMIN — ENOXAPARIN SODIUM 40 MILLIGRAM(S): 100 INJECTION SUBCUTANEOUS at 09:31

## 2018-10-08 RX ADMIN — Medication 75 MILLIGRAM(S): at 21:13

## 2018-10-08 RX ADMIN — LISINOPRIL 5 MILLIGRAM(S): 2.5 TABLET ORAL at 09:32

## 2018-10-08 RX ADMIN — INSULIN GLARGINE 30 UNIT(S): 100 INJECTION, SOLUTION SUBCUTANEOUS at 21:12

## 2018-10-08 NOTE — PROGRESS NOTE BEHAVIORAL HEALTH - NSBHFUPINTERVALHXFT_PSY_A_CORE
Pt with denial of any suicidal ideation or plan Pt with plans for continuing with [facility] for continued treatement

## 2018-10-08 NOTE — PROGRESS NOTE BEHAVIORAL HEALTH - DETAILS
stage 4 colon cancer with mets to the liver and lung  as recorded by information from [facility] DM

## 2018-10-08 NOTE — PROGRESS NOTE BEHAVIORAL HEALTH - SUMMARY
52 YOWW with schizoaffective disorder bipolar Type. with colon cancer 4th stage with newly discovered liver mets, presents to ER psychotic, manic , in the light of noncompliance and partial compliance. She gets haldol dec injection and last one was on 8/16/18, but she is not compliant with other meds.   PT is not suicidal or homicidal. But she si disorganized and cannot care for herself.   care coordinated withy ER attending. They are still completing medical workup and will admit pt to medicine. As per FSL pt presents the same way for at least 1 year and the only change in behavior is that she stopped attending her group psychotherapy sessions.       [facility] plan is astrid keep pt on 1;1 and continue haldol dec. next one is due on 9/13/18.  Would do CT head and check ammonia level.     C&L will f/u

## 2018-10-08 NOTE — PROGRESS NOTE BEHAVIORAL HEALTH - NSBHCHARTREVIEWVS_PSY_A_CORE FT
Vital Signs Last 24 Hrs  T(C): 36.5 (08 Oct 2018 07:24), Max: 36.5 (08 Oct 2018 07:24)  T(F): 97.7 (08 Oct 2018 07:24), Max: 97.7 (08 Oct 2018 07:24)  HR: 67 (08 Oct 2018 07:24) (67 - 67)  BP: 100/65 (08 Oct 2018 07:24) (100/65 - 100/65)  BP(mean): --  RR: 16 (08 Oct 2018 07:24) (16 - 16)  SpO2: 100% (08 Oct 2018 07:24) (100% - 100%)

## 2018-10-09 VITALS
OXYGEN SATURATION: 98 % | RESPIRATION RATE: 14 BRPM | SYSTOLIC BLOOD PRESSURE: 97 MMHG | DIASTOLIC BLOOD PRESSURE: 57 MMHG | HEART RATE: 69 BPM | TEMPERATURE: 99 F

## 2018-10-09 LAB
GLUCOSE BLDC GLUCOMTR-MCNC: 109 MG/DL — HIGH (ref 70–99)
GLUCOSE BLDC GLUCOMTR-MCNC: 202 MG/DL — HIGH (ref 70–99)

## 2018-10-09 RX ORDER — TRAZODONE HCL 50 MG
1 TABLET ORAL
Qty: 14 | Refills: 0 | OUTPATIENT
Start: 2018-10-09 | End: 2018-10-22

## 2018-10-09 RX ORDER — DIVALPROEX SODIUM 500 MG/1
2 TABLET, DELAYED RELEASE ORAL
Qty: 30 | Refills: 1 | OUTPATIENT
Start: 2018-10-09 | End: 2018-11-07

## 2018-10-09 RX ORDER — BENZTROPINE MESYLATE 1 MG
1 TABLET ORAL
Qty: 28 | Refills: 1 | OUTPATIENT
Start: 2018-10-09 | End: 2018-11-05

## 2018-10-09 RX ORDER — TRAZODONE HCL 50 MG
0.5 TABLET ORAL
Qty: 7 | Refills: 0 | OUTPATIENT
Start: 2018-10-09 | End: 2018-10-22

## 2018-10-09 RX ORDER — TRAZODONE HCL 50 MG
50 TABLET ORAL AT BEDTIME
Qty: 0 | Refills: 0 | Status: DISCONTINUED | OUTPATIENT
Start: 2018-10-09 | End: 2018-10-09

## 2018-10-09 RX ORDER — DIVALPROEX SODIUM 500 MG/1
750 TABLET, DELAYED RELEASE ORAL
Qty: 0 | Refills: 0 | COMMUNITY

## 2018-10-09 RX ORDER — LISINOPRIL 2.5 MG/1
1 TABLET ORAL
Qty: 0 | Refills: 0 | COMMUNITY

## 2018-10-09 RX ORDER — LISINOPRIL 2.5 MG/1
1 TABLET ORAL
Qty: 15 | Refills: 1 | OUTPATIENT
Start: 2018-10-09 | End: 2018-11-07

## 2018-10-09 RX ORDER — ATORVASTATIN CALCIUM 80 MG/1
1 TABLET, FILM COATED ORAL
Qty: 14 | Refills: 1 | OUTPATIENT
Start: 2018-10-09 | End: 2018-11-05

## 2018-10-09 RX ORDER — HALOPERIDOL DECANOATE 100 MG/ML
1 INJECTION INTRAMUSCULAR
Qty: 14 | Refills: 1 | OUTPATIENT
Start: 2018-10-09 | End: 2018-11-05

## 2018-10-09 RX ORDER — HALOPERIDOL DECANOATE 100 MG/ML
1 INJECTION INTRAMUSCULAR
Qty: 0 | Refills: 0 | COMMUNITY

## 2018-10-09 RX ORDER — HALOPERIDOL DECANOATE 100 MG/ML
1 INJECTION INTRAMUSCULAR
Qty: 0 | Refills: 0 | COMMUNITY
Start: 2018-10-09

## 2018-10-09 RX ORDER — HALOPERIDOL DECANOATE 100 MG/ML
10 INJECTION INTRAMUSCULAR DAILY
Qty: 0 | Refills: 0 | Status: DISCONTINUED | OUTPATIENT
Start: 2018-10-09 | End: 2018-10-09

## 2018-10-09 RX ADMIN — ENOXAPARIN SODIUM 40 MILLIGRAM(S): 100 INJECTION SUBCUTANEOUS at 09:30

## 2018-10-09 RX ADMIN — HALOPERIDOL DECANOATE 10 MILLIGRAM(S): 100 INJECTION INTRAMUSCULAR at 09:30

## 2018-10-09 RX ADMIN — DIVALPROEX SODIUM 1000 MILLIGRAM(S): 500 TABLET, DELAYED RELEASE ORAL at 09:31

## 2018-10-09 RX ADMIN — Medication 4: at 12:27

## 2018-10-09 RX ADMIN — Medication 1 MILLIGRAM(S): at 09:31

## 2018-10-09 NOTE — PROGRESS NOTE BEHAVIORAL HEALTH - MOOD
Other/Irritable/Angry
Irritable/Other/Angry
Other/Angry/Normal/Irritable
Angry/Irritable/Other
Irritable/Other/Angry
Other/Angry/Normal/Irritable
Other/Normal
Irritable/Angry/Normal/Other
Angry/Other/Irritable
Irritable/Angry/Other
Angry/Other/Irritable
Angry/Other/Irritable/Normal
Irritable/Normal/Other/Angry
Other/Angry/Irritable/Normal
Angry/Irritable/Other
Angry/Other/Irritable
Irritable/Angry/Other
Irritable/Angry/Other
Angry/Other/Irritable
Irritable/Angry/Other
Irritable/Angry/Other
Normal/Other/Angry/Irritable
Irritable/Angry/Other
Normal/Other/Angry/Irritable
Other/Irritable/Angry
Other/Angry/Irritable
Angry/Irritable/Other

## 2018-10-09 NOTE — PROGRESS NOTE BEHAVIORAL HEALTH - NSBHADMITIPOBS_PSY_A_CORE
Routine observation
Enhanced supervision
Routine observation

## 2018-10-09 NOTE — PROGRESS NOTE BEHAVIORAL HEALTH - NS ED BHA MED ROS ENDOCRINE
Yes
Yes
No complaints
Yes

## 2018-10-09 NOTE — PROGRESS NOTE BEHAVIORAL HEALTH - NS ED BHA MSE SPEECH ARTICULATION
Other
Normal
Other
Other
Normal
Other
Normal
Other
Normal
Other
Other
Normal
Other

## 2018-10-09 NOTE — PROGRESS NOTE BEHAVIORAL HEALTH - JUDGMENT (REGARDING EVERYDAY EVENTS)
Fair
Poor
Poor
Fair
Poor
Fair
Poor
Poor
Fair
Poor
Poor
Fair
Fair
Poor
Fair
Fair
Poor
Poor

## 2018-10-09 NOTE — PROGRESS NOTE BEHAVIORAL HEALTH - NS ED BHA MED ROS GENITOURINARY
No complaints

## 2018-10-09 NOTE — PROGRESS NOTE BEHAVIORAL HEALTH - PRIMARY DX
Schizoaffective disorder, bipolar type

## 2018-10-09 NOTE — PROGRESS NOTE BEHAVIORAL HEALTH - NS ED BHA MED ROS PSYCHIATRIC
See HPI

## 2018-10-09 NOTE — PROGRESS NOTE BEHAVIORAL HEALTH - AXIS III
Medical hx is significant for DM, stage 4 colon cancer mets to liver with liver enzymes increased, and lung mets.
Medical hx is significant for DM, stage 4 colon cancer new mets to liver with liver enzymes increased.
Medical hx is significant for DM, stage 4 colon cancer mets to liver with liver enzymes increased, and lung mets.
Medical hx is significant for DM, stage 4 colon cancer new mets to liver with liver enzymes increased.
Medical hx is significant for DM, stage 4 colon cancer mets to liver with liver enzymes increased, and lung mets.
Medical hx is significant for DM, stage 4 colon cancer new mets to liver with liver enzymes increased.

## 2018-10-09 NOTE — PROGRESS NOTE BEHAVIORAL HEALTH - DETAILS
DM stage 4 colon cancer with mets to the liver and lung  as recorded by information from [facility]

## 2018-10-09 NOTE — PROGRESS NOTE BEHAVIORAL HEALTH - NSBHLEGALSTATUS_PSY_A_CORE
9.39 (Emergency)
9.39 (Emergency)
9.27 (2PC)
9.39 (Emergency)
9.39 (Emergency)
9.27 (2PC)
9.39 (Emergency)
9.39 (Emergency)
9.27 (2PC)
9.39 (Emergency)
9.27 (2PC)

## 2018-10-09 NOTE — PROGRESS NOTE BEHAVIORAL HEALTH - NS ED BHA MSE SPEECH RATE
Other
Normal
Other
Normal
Other
Normal
Other
Normal
Normal
Other

## 2018-10-09 NOTE — PROGRESS NOTE BEHAVIORAL HEALTH - REMOTE MEMORY
Normal
Normal
Impaired
Other
Normal
Other
Impaired
Normal
Other
Normal
Impaired
Impaired
Normal
Impaired
Other
Impaired
Normal
Normal
Impaired
Other

## 2018-10-09 NOTE — PROGRESS NOTE BEHAVIORAL HEALTH - INSIGHT (REGARDING PSYCHIATRIC ILLNESS)
Poor

## 2018-10-09 NOTE — PROGRESS NOTE BEHAVIORAL HEALTH - NS ED BHA AXIS I SECONDARY1 CODE FT
C18.9
Z91.19
C18.9
Z53.29
C18.9
Z53.29

## 2018-10-09 NOTE — PROGRESS NOTE BEHAVIORAL HEALTH - BEHAVIOR
Cooperative
Uncooperative
Cooperative
Cooperative
Uncooperative
Cooperative
Uncooperative
Cooperative
Uncooperative
Cooperative
Uncooperative
Cooperative
Uncooperative

## 2018-10-09 NOTE — PROGRESS NOTE BEHAVIORAL HEALTH - NSBHADMITMEDEDU_PSY_A_CORE
yes...
no
yes...

## 2018-10-09 NOTE — PROGRESS NOTE BEHAVIORAL HEALTH - AFFECT RANGE
Constricted
Constricted
Full
Constricted
Constricted
Full
Constricted
Labile
Labile
Constricted
Labile
Full
Constricted
Constricted
Labile
Constricted
Constricted

## 2018-10-09 NOTE — PROGRESS NOTE BEHAVIORAL HEALTH - NSBHFUPVIOL_PSY_A_CORE
none known
unable to assess
none known
yes
yes
none known
unable to assess
none known
none known

## 2018-10-09 NOTE — PROGRESS NOTE BEHAVIORAL HEALTH - ABNORMAL MOVEMENTS
No abnormal movements

## 2018-10-09 NOTE — PROGRESS NOTE BEHAVIORAL HEALTH - SECONDARY DX2
Noncompliance by refusing intervention or support
Noncompliance by refusing intervention or support
Diabetes mellitus
Diabetes mellitus
Noncompliance by refusing intervention or support
Diabetes mellitus
Noncompliance by refusing intervention or support
Diabetes mellitus
Colon cancer

## 2018-10-09 NOTE — PROGRESS NOTE BEHAVIORAL HEALTH - NS ED BHA MSE SPEECH VOLUME
Normal
Other
Normal
Other
Loud
Loud
Normal
Other
Normal
Loud
Loud
Other
Normal
Other

## 2018-10-09 NOTE — PROGRESS NOTE BEHAVIORAL HEALTH - IMPULSE CONTROL
Normal
Impaired
Normal
Impaired
Impaired
Normal
Impaired
Impaired
Normal
Impaired
Normal
Impaired

## 2018-10-09 NOTE — PROGRESS NOTE BEHAVIORAL HEALTH - ORIENTED TO TIME
Yes
Yes
Other
Other
Yes
Other
Yes
Other
Yes
Other
Other
Yes
Other
Yes
Yes
Other
Other

## 2018-10-09 NOTE — PROGRESS NOTE BEHAVIORAL HEALTH - NSBHLOC_PSY_A_CORE
Alert

## 2018-10-09 NOTE — PROGRESS NOTE BEHAVIORAL HEALTH - RISK ASSESSMENT
Pt is not at imminent risk to harm self  and others. However she is psychotic and manic and can not care for herself.
Pt is not at imminent risk to harm self  and others. However she is delusional and makes decisions based on her delusional thinking.
Pt is not at imminent risk to harm self  and others. However she is psychotic and manic and can not care for herself.
Risk Assessment (consider static vs modifiable risk factors and protective factors; comment on level of risk for dangerous behavior): Pt is not at imminent risk to harm self  and others. However she is delusional and makes decisions based on her delusional thinking.
Pt is not at imminent risk to harm self  and others. However she is psychotic and manic and can not care for herself.

## 2018-10-09 NOTE — PROGRESS NOTE BEHAVIORAL HEALTH - NSBHADMITIPREASON_PSY_A_CORE
Danger to self; mental illness expected to respond to inpatient care
other reason
other reason
pt continues improving with goal directed speech
other reason
other reason
Danger to self; mental illness expected to respond to inpatient care
Danger to self; mental illness expected to respond to inpatient care
other reason
pt continues improving with goal directed speech
other reason
Danger to self; mental illness expected to respond to inpatient care
Danger to self; mental illness expected to respond to inpatient care
other reason
Danger to self; mental illness expected to respond to inpatient care
other reason
other reason
Danger to self; mental illness expected to respond to inpatient care
other reason

## 2018-10-09 NOTE — PROGRESS NOTE BEHAVIORAL HEALTH - SECONDARY DX1
Colon cancer
Noncompliance
Colon cancer
Noncompliance by refusing intervention or support
Colon cancer
Noncompliance by refusing intervention or support

## 2018-10-09 NOTE — PROGRESS NOTE BEHAVIORAL HEALTH - ESTIMATED DISCHARGE DATE
21-Sep-2018
03-Oct-2018
22-Sep-2018
23-Sep-2018
28-Sep-2018
20-Sep-2018
03-Oct-2018
23-Sep-2018
23-Sep-2018
28-Sep-2018
03-Oct-2018
30-Sep-2018
19-Sep-2018
20-Sep-2018
13-Sep-2018
13-Sep-2018
24-Sep-2018

## 2018-10-09 NOTE — PROGRESS NOTE BEHAVIORAL HEALTH - AFFECT CONGRUENCE
Not congruent
Not congruent
Congruent
Not congruent
Not congruent
Congruent
Not congruent
Congruent
Not congruent
Congruent
Not congruent

## 2018-10-09 NOTE — PROGRESS NOTE BEHAVIORAL HEALTH - NSBHCHARTREVIEWINVESTIGATE_PSY_A_CORE FT
QRS axis to [] ° and NSR at a rate of [] BPM. There was no atrial enlargement. There was no ventricular hypertrophy. There were no ST-T changes and all intervals were normal.
Ventricular Rate 75 BPM    Atrial Rate 75 BPM    P-R Interval 146 ms    QRS Duration 76 ms    Q-T Interval 408 ms    QTC Calculation(Bezet) 455 ms    P Axis 40 degrees    R Axis -18 degrees    T Axis -3 degrees    Diagnosis Line Normal sinus rhythm  Moderate voltage criteria for LVH, may be normal variant  Borderline ECG  When compared with ECG of 03-MAY-2012 11:40,  Nonspecific T wave abnormality no longer evident in Anterolateral leads  Confirmed by KRISTOPHER GOMEZ MD ([id number]) on 9/10/2018 6:25:03 PM
QRS axis to [] ° and NSR at a rate of [] BPM. There was no atrial enlargement. There was no ventricular hypertrophy. There were no ST-T changes and all intervals were normal.
QRS axis to [] ° and NSR at a rate of [] BPM. There was no atrial enlargement. There was no ventricular hypertrophy. There were no ST-T changes and all intervals were normal.
< from: 12 Lead ECG (09.08.18 @ 00:52) >    Ventricular Rate 75 BPM    Atrial Rate 75 BPM    P-R Interval 146 ms    QRS Duration 76 ms    Q-T Interval 408 ms    QTC Calculation(Bezet) 455 ms    P Axis 40 degrees    R Axis -18 degrees    T Axis -3 degrees    Diagnosis Line Normal sinus rhythm  Moderate voltage criteria for LVH, may be normal variant  Borderline ECG  When compared with ECG of 03-MAY-2012 11:40,  Nonspecific T wave abnormality no longer evident in Anterolateral leads  Confirmed by KRISTOPHER GOMEZ MD ([id number]) on 9/10/2018 6:25:03 PM    < end of copied text >

## 2018-10-09 NOTE — PROGRESS NOTE BEHAVIORAL HEALTH - NSBHADMITIPOBSFT_PSY_A_CORE
pt is frequently noncompliant
pt denies any suicidal ideation or plan
No current risk for harm to self or others
pt denies any suicidal ideation or plan Pt with improving in goal directed speech , and concentration
pt with paranoid delusions, illogical
pt refusing all medication
pt denies any suicidal ideation or plan
pt with continued labile mood and affect, suspicious and guarded and minimizing symptoms.
pt denies any suicidal ideation or plan Pt with improving in goal directed speech , and concentration
pt with continued labile mood and affect
pt denies any suicidal ideation or plan
pt with delusional thoughts and was in the recent past with noncompliance
pt with delusional thoughts and was in the recent past with noncompliance
enhanced supervision
pt with delusional thoughts and was in the recent past with noncompliance
pt denies any suicidal ideation or plan
pt denies any suicidal ideation or plan
no acute safety concerns while in pt.
pt with grandiose delusion and labile mood and affect
pt with grandiose delusion and labile mood and affect
pt denies any mental illness and denies medical illness

## 2018-10-09 NOTE — PROGRESS NOTE BEHAVIORAL HEALTH - ORIENTED TO PLACE
Yes
Other
Yes
Other
Yes
Other
Yes
Other
Other
Yes
Other
Yes
Other

## 2018-10-09 NOTE — PROGRESS NOTE BEHAVIORAL HEALTH - RECENT MEMORY
Normal
Normal
Impaired
Other
Normal
Other
Impaired
Normal
Other
Normal
Impaired
Impaired
Normal
Impaired
Normal
Other
Other
Impaired
Normal
Normal
Impaired
Other

## 2018-10-09 NOTE — PROGRESS NOTE BEHAVIORAL HEALTH - NSBHFUPSUICINTERVAL_PSY_A_CORE
none known
unable to assess
none known
unable to assess
none known
unable to assess
yes
none known

## 2018-10-09 NOTE — PROGRESS NOTE BEHAVIORAL HEALTH - HYGIENE
Fair
Poor
Fair
Poor
Poor
Fair
Poor
Fair
Fair
Poor

## 2018-10-09 NOTE — PROGRESS NOTE BEHAVIORAL HEALTH - THOUGHT CONTENT
Unremarkable
Unremarkable
Delusions
Delusions
Unremarkable
Delusions
Unremarkable
Delusions
Unremarkable
Delusions
Delusions
Unremarkable
Unremarkable
Delusions
Unremarkable
Delusions/Preoccupations
Unremarkable
Delusions

## 2018-10-09 NOTE — PROGRESS NOTE BEHAVIORAL HEALTH - NSBHFUPINTERVALHXFT_PSY_A_CORE
Pt with denial of any suicidal ideation or plan Pt with plans for continuing with [facility] for continued treatement Pt with denial of any suicidal ideation or plan Pt with plans for continuing with [facility] for continued treatement Pt with denial of any side effect from medication Pt with verbalized willing to continue with her medication until directed by her provider to discontinue with medication   Pt with continued help from her family.  Pt denies any suicidal ideation or plan

## 2018-10-09 NOTE — PROGRESS NOTE BEHAVIORAL HEALTH - NSBHADMITIPREASONDETAILS_PSY_A_CORE FT
pt denies any suicidal ideation or plan Pt with need for support
Unable to care for self secondary to severe psychosis
pt with refusal of all blood work and medication
pt denies any suicidal ideation or plan Pt with need for support
pt with gradual decrease of the delusional thoughts.
pt denies any suicidal ideation or plan
pt denies any suicidal ideation or plan
pt is not able to care for self.  Reportedly the pt had threatened another person
pt is not able to care for self.  Reportedly the pt had threatened another person
pt unable to care for self and is delusion, noncompliance with treatment

## 2018-10-09 NOTE — PROGRESS NOTE BEHAVIORAL HEALTH - ESTIMATED INTELLIGENCE
Average

## 2018-10-09 NOTE — PROGRESS NOTE BEHAVIORAL HEALTH - BODY HABITUS
Well nourished

## 2018-10-09 NOTE — PROGRESS NOTE BEHAVIORAL HEALTH - NS ED BHA AXIS I PRIMARY CODE FT
F25.0

## 2018-10-09 NOTE — PROGRESS NOTE BEHAVIORAL HEALTH - GAIT / STATION
Normal gait / station

## 2018-10-09 NOTE — PROGRESS NOTE BEHAVIORAL HEALTH - NS ED BHA MED ROS GASTROINTESTINAL
No complaints
Yes

## 2018-10-09 NOTE — PROGRESS NOTE BEHAVIORAL HEALTH - NSBHFUPTYPE_PSY_A_CORE
Inpatient
Inpatient-On Service Note
Inpatient
Inpatient-On Service Note

## 2018-10-09 NOTE — PROGRESS NOTE BEHAVIORAL HEALTH - FUND OF KNOWLEDGE
Normal
Normal
Other
Normal
Other
Normal
Other
Normal
Other
Other
Normal
Other

## 2018-10-09 NOTE — PROGRESS NOTE BEHAVIORAL HEALTH - NS ED BHA MSE GENERAL APPEARANCE
Well developed

## 2018-10-09 NOTE — PROGRESS NOTE BEHAVIORAL HEALTH - ATTENTION / CONCENTRATION
Normal
Normal
Impaired
Impaired
Normal
Impaired
Normal
Impaired
Normal
Impaired
Impaired
Normal
Impaired
Normal
Normal
Impaired
Impaired

## 2018-10-09 NOTE — PROGRESS NOTE BEHAVIORAL HEALTH - AFFECT QUALITY
Irritable
Irritable
Euthymic
Irritable
Irritable
Elevated
Euthymic
Euthymic
Irritable
Euthymic
Irritable

## 2018-10-09 NOTE — PROGRESS NOTE BEHAVIORAL HEALTH - EYE CONTACT
Good
Fair
Good
Good
Poor
Fair
Good
Poor
Poor
Fair
Good
Fair
Poor
Good
Fair
Fair
Poor
Good
Poor

## 2018-10-09 NOTE — PROGRESS NOTE BEHAVIORAL HEALTH - NS ED BHA AXIS I SECONDARY2 CODE FT
E11.9
Z53.29
Z53.29
E11.9
E11.9
Z53.29
E11.9
Z53.29
E11.9
C18.9

## 2018-10-09 NOTE — PROGRESS NOTE BEHAVIORAL HEALTH - THOUGHT PROCESS
Linear
Linear
Illogical/Impaired reasoning
Linear
Impaired reasoning/Illogical
Illogical/Impaired reasoning
Impaired reasoning/Overinclusive/Illogical
Impaired reasoning/Illogical
Illogical/Impaired reasoning
Illogical/Linear
Illogical/Impaired reasoning
Illogical/Impaired reasoning
Impaired reasoning/Illogical
Linear
Illogical/Impaired reasoning
Illogical/Impaired reasoning
Linear
Linear/Illogical
Impaired reasoning/Illogical
Illogical/Linear
Linear
Overinclusive/Illogical/Impaired reasoning
Impaired reasoning/Illogical
Impaired reasoning/Illogical

## 2018-10-09 NOTE — PROGRESS NOTE BEHAVIORAL HEALTH - RELATEDNESS
Fair
Poor
Fair
Poor
Poor
Fair
Poor
Fair
Poor
Fair
Poor

## 2018-10-09 NOTE — PROGRESS NOTE BEHAVIORAL HEALTH - MUSCLE TONE / STRENGTH
Normal muscle tone/strength

## 2018-10-09 NOTE — PROGRESS NOTE BEHAVIORAL HEALTH - PERCEPTIONS
No abnormalities
No abnormalities
Auditory hallucinations/Other
Auditory hallucinations/Other
No abnormalities
Auditory hallucinations/Other
Auditory hallucinations
Other/Auditory hallucinations
Auditory hallucinations/Other
No abnormalities
Other/Auditory hallucinations
Auditory hallucinations/Other
Other/Auditory hallucinations
Other/Auditory hallucinations
No abnormalities
Auditory hallucinations/Other
No abnormalities
Other/Auditory hallucinations
No abnormalities
Auditory hallucinations
No abnormalities
Auditory hallucinations/Other

## 2018-10-09 NOTE — PROGRESS NOTE BEHAVIORAL HEALTH - THOUGHT ASSOCIATIONS
Loose

## 2018-10-11 NOTE — CHART NOTE - NSCHARTNOTEFT_GEN_A_CORE
Patient called twice yesterday and this morning.  Left voice messages with no return call. Letter to be sent to address on file.

## 2018-10-17 DIAGNOSIS — F25.0 SCHIZOAFFECTIVE DISORDER, BIPOLAR TYPE: ICD-10-CM

## 2018-10-17 DIAGNOSIS — E11.65 TYPE 2 DIABETES MELLITUS WITH HYPERGLYCEMIA: ICD-10-CM

## 2018-10-17 DIAGNOSIS — I10 ESSENTIAL (PRIMARY) HYPERTENSION: ICD-10-CM

## 2018-10-17 DIAGNOSIS — Z53.29 PROCEDURE AND TREATMENT NOT CARRIED OUT BECAUSE OF PATIENT'S DECISION FOR OTHER REASONS: ICD-10-CM

## 2018-10-17 DIAGNOSIS — H16.009 UNSPECIFIED CORNEAL ULCER, UNSPECIFIED EYE: ICD-10-CM

## 2018-10-17 DIAGNOSIS — C78.7 SECONDARY MALIGNANT NEOPLASM OF LIVER AND INTRAHEPATIC BILE DUCT: ICD-10-CM

## 2018-10-17 DIAGNOSIS — Z91.19 PATIENT'S NONCOMPLIANCE WITH OTHER MEDICAL TREATMENT AND REGIMEN: ICD-10-CM

## 2018-10-17 DIAGNOSIS — C18.9 MALIGNANT NEOPLASM OF COLON, UNSPECIFIED: ICD-10-CM

## 2019-02-26 NOTE — PROGRESS NOTE BEHAVIORAL HEALTH - NSBHCHARTREVIEWVS_PSY_A_CORE FT
Dr. Beal notified about Cdiff positive, however, showing negative for Cdiff toxin.  No new orders at this time.    Vital Signs Last 24 Hrs  T(C): 36.8 (03 Oct 2018 09:08), Max: 36.8 (03 Oct 2018 09:08)  T(F): 98.3 (03 Oct 2018 09:08), Max: 98.3 (03 Oct 2018 09:08)  HR: 79 (03 Oct 2018 09:08) (79 - 79)  BP: 95/58 (03 Oct 2018 09:08) (95/58 - 95/58)  BP(mean): --  RR: 14 (03 Oct 2018 09:08) (14 - 14)  SpO2: 100% (03 Oct 2018 09:08) (100% - 100%)

## 2019-08-15 NOTE — PROGRESS NOTE BEHAVIORAL HEALTH - NSBHFUPVIOLOTHERS_PSY_A_CORE
1. For depression and anxiety symptoms start fluoxetine (prozac).  Start 10mg once a day in the morning.  It will take 4-6 weeks to see full effect.  Side effects include drowsiness, weight gain, insomnia, anxiety, dizziness, headache, and nausea.  Some of these get better after the first 2-4 weeks.  We may have to try several different medications before we find the one that's right for you.  Some patients experience worsening of mood and can even have suicidal thoughts when they start these medications.  If that is the case go to the ER.    2.  Someone will call you to establish with a therapist    3.  I think trying to establish a regular sleep schedule and walking for 30min every day is a good place to start for self care.    4.  HPV shot today    General recommendations for sleep problems (Insomnia)  Allow 2-4 weeks to see results     Establish a regular sleep schedule   Go to bed at same time each night, get up at same time each day, avoid sleeping-in on weekends.  Cut down time in bed (if not asleep, get up, go in other room, do something calming and boring such as sorting papers, making warm milk, knitting, dusting)   Avoid trying to force yourself to sleep.  Use your bed only for sleep. Do not read or watch Television in bed.     Make the bedroom comfortable  Keepthe temperature in your bedroom comfortable, keep bedroom quiet when sleeping, and consider ear plugs (silicon) especially if you have partner who snores.  Keep bedroom dark enough:  use dark blinds or wear an eye mask if needed.    Relax at bedtime.    Do not watch tv or play video games or surf on computer right before bed; the full spectrum light actually stimulates your brain!  Relax each muscle group individually ; begin with your feet, work toward your head. Deal with your worries before bedtime by setting aside a worry time for 30 minutes earlier or journal your thoughts.  Listen to relaxation tapes such as Classical Music or Nature sounds  or imagine a tranquil scene (e.g. waterfall or beach)   Back Massage : Gentle 5-minute back rub prior to bedtime can help relax you.    Perform measures to make you tired at bedtime.   Get regular Exercise each day (at least 6 hours before bedtime)   Take medications only as directed   Eat a light bedtime snack or warm drink, such as milk or herbal tea (non-caffeinated)   No Napping during day     Stimulus Control   Do not lie awake for more than 30 minutes.   Get out of bed and perform a quiet activity, then return to bed when sleepy.  Repeat as many times per night as needed     Things to avoid   Do not Exercise just before bedtime   No overstimulating activities just before bed, such as competitive games or exciting Television programs  Avoid caffeine (coffee, tea, soda), chocolate after lunchtime   Do not use alcohol to induce sleep (worsens Insomnia)   Do not take someone else's sleeping pills   Do not look at the clock when awakening   Do not turn on light when getting up to use bathroom     Read the book Say Good Night To Insomnia by Mo.    I recommend taking melatonin at night to help with insomnia. This is a natural sleep inducer that shouldn't leave you feeling sedated or too tired in the morning.  Many people use it to help prevent jet lag when traveling.      It is available over the counter in drug stores and grocery stores.  Please follow the instruction on the bottle.  Dosages vary from 0.3 mg to 3 mg, and even very low doses appear to be effective.  Take it about 30 minutes before you lie down at night.            Preventive Care at the 15 - 18 Year Visit    Growth Percentiles & Measurements   Weight: 157 lbs 0 oz / 71.2 kg (actual weight) / 91 %ile based on CDC (Girls, 2-20 Years) weight-for-age data based on Weight recorded on 8/16/2019.   Length: Data Unavailable / 0 cm No height on file for this encounter.   BMI: There is no height or weight on file to calculate BMI. No height and  weight on file for this encounter.     Next Visit    Continue to see your health care provider every year for preventive care.    Nutrition    It s very important to eat breakfast. This will help you make it through the morning.    Sit down with your family for a meal on a regular basis.    Eat healthy meals and snacks, including fruits and vegetables. Avoid salty and sugary snack foods.    Be sure to eat foods that are high in calcium and iron.    Avoid or limit caffeine (often found in soda pop).    Sleeping    Your body needs about 9 hours of sleep each night.    Keep screens (TV, computer, and video) out of the bedroom / sleeping area.  They can lead to poor sleep habits and increased obesity.    Health    Limit TV, computer and video time.    Set a goal to be physically fit.  Do some form of exercise every day.  It can be an active sport like skating, running, swimming, a team sport, etc.    Try to get 30 to 60 minutes of exercise at least three times a week.    Make healthy choices: don t smoke or drink alcohol; don t use drugs.    In your teen years, you can expect . . .    To develop or strengthen hobbies.    To build strong friendships.    To be more responsible for yourself and your actions.    To be more independent.    To set more goals for yourself.    To use words that best express your thoughts and feelings.    To develop self-confidence and a sense of self.    To make choices about your education and future career.    To see big differences in how you and your friends grow and develop.    To have body odor from perspiration (sweating).  Use underarm deodorant each day.    To have some acne, sometimes or all the time.  (Talk with your doctor or nurse about this.)    Most girls have finished going through puberty by 15 to 16 years. Often, boys are still growing and building muscle mass.    Sexuality    It is normal to have sexual feelings.    Find a supportive person who can answer questions about  puberty, sexual development, sex, abstinence (choosing not to have sex), sexually transmitted diseases (STDs) and birth control.    Think about how you can say no to sex.    Safety    Accidents are the greatest threat to your health and life.    Avoid dangerous behaviors and situations.  For example, never drive after drinking or using drugs.  Never get in a car if the  has been drinking or using drugs.    Always wear a seat belt in the car.  When you drive, make it a rule for all passengers to wear seat belts, too.    Stay within the speed limit and avoid distractions.    Practice a fire escape plan at home. Check smoke detector batteries twice a year.    Keep electric items (like blow dryers, razors, curling irons, etc.) away from water.    Wear a helmet and other protective gear when bike riding, skating, skateboarding, etc.    Use sunscreen to reduce your risk of skin cancer.    Learn first aid and CPR (cardiopulmonary resuscitation).    Avoid peers who try to pressure you into risky activities.    Learn skills to manage stress, anger and conflict.    Do not use or carry any kind of weapon.    Find a supportive person (teacher, parent, health provider, counselor) whom you can talk to when you feel sad, angry, lonely or like hurting yourself.    Find help if you are being abused physically or sexually, or if you fear being hurt by others.    As a teenager, you will be given more responsibility for your health and health care decisions.  While your parent or guardian still has an important role, you will likely start spending some time alone with your health care provider as you get older.  Some teen health issues are actually considered confidential, and are protected by law.  Your health care team will discuss this and what it means with you.  Our goal is for you to become comfortable and confident caring for your own health.  ================================================================   yes

## 2020-05-21 NOTE — H&P ADULT - NSHPPHYSICALEXAM_GEN_ALL_CORE
Quality 130: Documentation Of Current Medications In The Medical Record: Current Medications Documented Detail Level: Detailed Quality 226: Preventive Care And Screening: Tobacco Use: Screening And Cessation Intervention: Patient screened for tobacco use and is an ex/non-smoker Vital Signs Last 24 Hrs  T(C): 36.9 (08 Sep 2018 00:10), Max: 37.2 (07 Sep 2018 20:22)  T(F): 98.4 (08 Sep 2018 00:10), Max: 99 (07 Sep 2018 20:22)  HR: 72 (08 Sep 2018 00:10) (72 - 85)  BP: 102/68 (08 Sep 2018 00:10) (102/68 - 150/85)  RR: 18 (08 Sep 2018 00:10) (18 - 18)  SpO2: 100% (08 Sep 2018 00:10) (100% - 100%)

## 2021-07-20 NOTE — CONSULT NOTE ADULT - CONSULT REQUESTED DATE/TIME
PHYSICIAN NEXT STEPS:  Review Only    CHIEF COMPLAINT:  Chief Complaint/Protocol Used: Arm Pain  Onset: 2 days ago      ASSESSMENT:  ? Onset: 2 days ago  ? Normal True  ? Onset: 2- 3 days ago   ? Location: Left arm by the elbow  ? Pain: 7/10  ? Work Or Exercise: Lifting at work  ? Cause: Unsure  ? Other Symptoms: Lump on the left armpit  -------------------------------------------------------    DISPOSITION:  Disposition Recommendation: See Physician within 24 Hours  Questions that led to disposition:  ? [1] Swelling is painful to touch AND [2] no fever  Patient Directed To: Unspecified  Patient Intended Action: Seek care in the doctor's office       CALL NOTES:  07/20/2021 at 4:44 PM by Kelly Bass  ? Patient declines 24 hours disposition due to conflict in schedule. Appointment scheduled 7/22/2021 at 10:40 am with Dr. Padilla at [facility] as requested. Task message sent to Dr. Brunner's office.     DISPOSITION OVERRIDE/PROVIDER CONSULT:  Disposition Override: N/A  Override Source: Unspecified  Consulted with PCP: No  Consulted with On-Call Physician: No    CALLER CONTACT INFO:  Name: Etienne Slater (Self)  Phone 1: [phone] (Mobile) - Preferred      ENCOUNTER STARTED:  07/20/21 04:25:04 PM  ENCOUNTER ASSIGNED TO/CLOSED BY:  Kelly Bass @ 07/20/21 04:46:18 PM      -------------------------------------------------------    CARE ADVICE given per Skin Lump or Localized Swelling guideline.  PAIN MEDICINES:  * For pain relief, take acetaminophen, ibuprofen, or naproxen.  * Use the lowest amount that makes your pain feel better.  ACETAMINOPHEN (E.G., TYLENOL):   * Take 650 mg (two 325 mg pills) by mouth every 4-6 hours as needed. Each Regular Strength Tylenol pill has 325 mg of acetaminophen. The most you should take each day is 3,250 mg (10 Regular Strength pills a day).  * Another choice is to take 1,000 mg (two 500 mg pills) every 8 hours as needed. Each Extra Strength Tylenol pill has 500 mg of  acetaminophen. The most you should take each day is 3,000 mg (6 Extra Strength pills a day).  IBUPROFEN (E.G., MOTRIN, ADVIL):  * Take 400 mg (two 200 mg pills) by mouth every 6 hours as needed.  * Another choice is to take 600 mg (three 200 mg pills) by mouth every 8 hours as needed.  * The most you should take each day is 1,200 mg (six 200 mg pills a day), unless your doctor has told you to take more.  NAPROXEN (E.G., ALEVE):  * Take 220 mg (one 220 mg pill) by mouth every 8 hours as needed. You may take 440 mg (two 220 mg pills) for your first dose.  * The most you should take each day is 660 mg (three 220 mg pills a day), unless your doctor has told you to take more.  EXTRA NOTES:  * Acetaminophen is thought to be safer than ibuprofen or naproxen for people over 65 years old. Acetaminophen is in many OTC and prescription medicines. It might be in more than one medicine that you are taking. You need to be careful and not take an   overdose. An acetaminophen overdose can hurt the liver.  * [employer], the company that makes Tylenol, has different dosage instructions for Tylenol in [place] and the [place]. In [place], the maximum recommended dose per day is 4,000 mg or twelve (12) Regular-Strength (325 mg) pills. In the [place],   [employer] recommends a maximum dose of ten (10) Regular-Strength (325 mg) pills.  * Before taking any medicine, read all the instructions on the package.; CALL BACK IF:    * Severe pain occurs   * Fever occurs    * You become worse.      UNDERSTANDS CARE ADVICE: No    AGREES WITH CARE ADVICE: No    WILL FOLLOW CARE ADVICE: No    -------------------------------------------------------   16-Sep-2018 12:06

## 2021-11-05 NOTE — PROGRESS NOTE BEHAVIORAL HEALTH - NSBHADMITMEDEDUDETAILS_A_CORE FT
Vanna Mejia is a 53 year old female presenting for   Chief Complaint   Patient presents with   • Nail Problem     right      Dryness or fungal development on her right ring finger, has been several months. Seems to have spread to her right middle finger. Used rescue Rx and she said it has helped if she uses it consistently.     Denies Latex allergy or sensitivity.    Medication verified, no changes  Refills needed today: No    Patient would like communication of their results via:      [employer]    Cell Phone:   Telephone Information:   Mobile [phone]     Okay to leave a message containing results? Yes    Health Maintenance Summary     Shingles Vaccine (1 of 2)  Overdue - never done    Cervical Cancer Screen - - (PAP/HPV Co-Testing - Every 5 Years)  Next due on 3/30/2022    Depression Screening (Yearly)  Next due on 2022    Breast Cancer Screening (Every 2 Years)  Next due on 3/4/2023    DTaP/Tdap/Td Vaccine (3 - Td or Tdap)  Next due on 3/30/2027    Colorectal Cancer Screen- (Colonoscopy - Every 10 Years)  Next due on 2027    COVID-19 Vaccine   Completed    Influenza Vaccine   Completed    Hepatitis B Vaccine   Aged Out    Meningococcal Vaccine   Aged Out    HPV Vaccine   Aged Out    Pneumococcal Vaccine 0-64   Aged Out         Patient is up to date, no discussion needed. Patient will wait to get shingles vaccine.     Blood pressure 102/64, pulse 73, weight 61 kg (134 lb 7.7 oz), last menstrual period 10/01/2015, SpO2 99 %.     COVID-19 Screenin. Does the patient OR patient’s household members have any of the following symptoms?  ? Temperature: Fever ?100.0°F or ?37.8°C?  no  ? Respiratory symptoms: New or worsening cough, shortness of breath, difficulty breathing, or sore throat? no  ? GI symptoms: New onset of nausea, vomiting or diarrhea?  no  ? Miscellaneous: New onset of loss of taste or smell, chills, repeated shaking with chills, muscle pain, headache, congestion or runny 
nose? no  2. Has the patient or a household member tested positive for COVID-19 in the last 14 days?  no  3. Has the patient or a household member been tested for COVID-19 and are waiting for the results?  no  Vaccines: yes  Wore mask: yes  Patient wore mask:  yes     
pt is aware of the use of the medication and of the potential
pt is aware of the use of medication and of the potential side effects
pt is aware of the use of the medication and of the potential
pt is aware of the use of medication and of the potential side effects
pt is aware of the use of medication and of the potential side effects
pt is aware of the use of the medication and of the potential side effects
pt is aware of the use of the medication and of the potential side effects

## 2023-07-26 NOTE — DISCHARGE NOTE BEHAVIORAL HEALTH - NSBHDCVIOLFCTRSFT_PSY_A_CORE
Detail Level: Detailed Add 35763 Cpt? (Important Note: In 2017 The Use Of 71870 Is Being Tracked By Cms To Determine Future Global Period Reimbursement For Global Periods): yes 1. psychosis, delusions-Pt is directed to continue with all medications until directed by her MD to change or discontinued with medications  2. 1. psychosis, delusions-Pt is directed to continue with all medications until directed by her MD to change or discontinued with medications  2.pt with no use of substances  3. pt with a stable place to live

## 2023-11-06 NOTE — PROGRESS NOTE BEHAVIORAL HEALTH - NSBHCHARTREVIEWVS_PSY_A_CORE FT
Physical Therapy Discharge    Date: 11/6/2023  Total Number of Visits: 4  Referred by: CATALINO Hermosillo  Medical Diagnosis (from order):   Diagnosis Information      Diagnosis    W19.XXXA, Y92.009 (ICD-10-CM) - Fall as cause of accidental injury in home as place of occurrence, initial encounter                Patient discharged due to not scheduling more appointments.  Status of goals: per status in last daily treatment note            Vital Signs Last 24 Hrs  T(C): 36.8 (01 Oct 2018 11:38), Max: 36.8 (01 Oct 2018 11:38)  T(F): 98.2 (01 Oct 2018 11:38), Max: 98.2 (01 Oct 2018 11:38)  HR: --  BP: --  BP(mean): --  RR: 12 (01 Oct 2018 11:38) (12 - 12)  SpO2: 99% (01 Oct 2018 11:38) (99% - 99%)

## 2024-01-05 NOTE — DISCHARGE NOTE BEHAVIORAL HEALTH - PRINCIPAL DIAGNOSIS
Initiate Treatment: - Restart Urea 40% cream daily. Detail Level: Zone Render In Strict Bullet Format?: No Modify Regimen: - Apply Impoyz cream BIW. Continue Regimen: - May continue to spot treat with Clindamycin Solution. \\n- OTC Differin 0.1% gel QHS. Initiate Treatment: - Triple Rosacea cream QD. Schizoaffective disorder, bipolar type

## 2024-06-17 NOTE — H&P ADULT - CARDIOVASCULAR
Continue current medications  Lifestyle modifications;healthy diet/exercise for weight loss and high cholesterol  BP is high. Start Lisinopril 10 mg daily. Monitor BP twice a day and return in 2 weeks for recheck.    Wellness Visit for Adults   AMBULATORY CARE:   A wellness visit  is when you see your healthcare provider to get screened for health problems. Your healthcare provider will also give you advice on how to stay healthy. Write down your questions so you remember to ask them. Ask your healthcare provider how often you should have a wellness visit.  What happens at a wellness visit:  Your healthcare provider will ask about your health, and your family history of health problems. This includes high blood pressure, heart disease, and cancer. He or she will ask if you have symptoms that concern you, if you smoke, and about your mood. You may also be asked about your intake of medicines, supplements, food, and alcohol. Any of the following may be done:  Your weight  will be checked. Your height may also be checked so your body mass index (BMI) can be calculated. Your BMI shows if you are at a healthy weight.    Your blood pressure  and heart rate will be checked. Your temperature may also be checked.    Blood and urine tests  may be done. Blood tests may be done to check your cholesterol levels. Abnormal cholesterol levels increase your risk for heart disease and stroke. You may also need a blood or urine test to check for diabetes if you are at increased risk. Urine tests may be done to look for signs of an infection or kidney disease.    A physical exam  includes checking your heartbeat and lungs with a stethoscope. Your healthcare provider may also check your skin to look for sun damage.    Screening tests  may be recommended. A screening test is done to check for diseases that may not cause symptoms. The screening tests you may need depend on your age, gender, family history, and lifestyle habits. For  example, colorectal screening may be recommended if you are 50 years old or older.    Screening tests you need if you are a woman:   A Pap smear  is used to screen for cervical cancer. Pap smears are usually done every 3 to 5 years depending on your age. You may need them more often if you have had abnormal Pap smear test results in the past. Ask your healthcare provider how often you should have a Pap smear.    A mammogram  is an x-ray of your breasts to screen for breast cancer. Experts recommend mammograms every 2 years starting at age 50 years. You may need a mammogram at age 49 years or younger if you have an increased risk for breast cancer. Talk to your healthcare provider about when you should start having mammograms and how often you need them.    Vaccines you may need:   Get an influenza vaccine  every year. The influenza vaccine protects you from the flu. Several types of viruses cause the flu. The viruses change over time, so new vaccines are made each year.    Get a tetanus-diphtheria (Td) booster vaccine  every 10 years. This vaccine protects you against tetanus and diphtheria. Tetanus is a severe infection that may cause painful muscle spasms and lockjaw. Diphtheria is a severe bacterial infection that causes a thick covering in the back of your mouth and throat.    Get a human papillomavirus (HPV) vaccine  if you are female and aged 19 to 26 or male 19 to 21 and never received it. This vaccine protects you from HPV infection. HPV is the most common infection spread by sexual contact. HPV may also cause vaginal, penile, and anal cancers.    Get a pneumococcal vaccine  if you are aged 65 years or older. The pneumococcal vaccine is an injection given to protect you from pneumococcal disease. Pneumococcal disease is an infection caused by pneumococcal bacteria. The infection may cause pneumonia, meningitis, or an ear infection.    Get a shingles vaccine  if you are 60 or older, even if you have had  shingles before. The shingles vaccine is an injection to protect you from the varicella-zoster virus. This is the same virus that causes chickenpox. Shingles is a painful rash that develops in people who had chickenpox or have been exposed to the virus.    How to eat healthy:  My Plate is a model for planning healthy meals. It shows the types and amounts of foods that should go on your plate. Fruits and vegetables make up about half of your plate, and grains and protein make up the other half. A serving of dairy is included on the side of your plate. The amount of calories and serving sizes you need depends on your age, gender, weight, and height. Examples of healthy foods are listed below:  Eat a variety of vegetables  such as dark green, red, and orange vegetables. You can also include canned vegetables low in sodium (salt) and frozen vegetables without added butter or sauces.    Eat a variety of fresh fruits , canned fruit in 100% juice, frozen fruit, and dried fruit.    Include whole grains.  At least half of the grains you eat should be whole grains. Examples include whole-wheat bread, wheat pasta, brown rice, and whole-grain cereals such as oatmeal.    Eat a variety of protein foods such as seafood (fish and shellfish), lean meat, and poultry without skin (turkey and chicken). Examples of lean meats include pork leg, shoulder, or tenderloin, and beef round, sirloin, tenderloin, and extra lean ground beef. Other protein foods include eggs and egg substitutes, beans, peas, soy products, nuts, and seeds.    Choose low-fat dairy products such as skim or 1% milk or low-fat yogurt, cheese, and cottage cheese.    Limit unhealthy fats  such as butter, hard margarine, and shortening.       Exercise:  Exercise at least 30 minutes per day on most days of the week. Some examples of exercise include walking, biking, dancing, and swimming. You can also fit in more physical activity by taking the stairs instead of the  elevator or parking farther away from stores. Include muscle strengthening activities 2 days each week. Regular exercise provides many health benefits. It helps you manage your weight, and decreases your risk for type 2 diabetes, heart disease, stroke, and high blood pressure. Exercise can also help improve your mood. Ask your healthcare provider about the best exercise plan for you.       General health and safety guidelines:   Do not smoke.  Nicotine and other chemicals in cigarettes and cigars can cause lung damage. Ask your healthcare provider for information if you currently smoke and need help to quit. E-cigarettes or smokeless tobacco still contain nicotine. Talk to your healthcare provider before you use these products.    Limit alcohol.  A drink of alcohol is 12 ounces of beer, 5 ounces of wine, or 1½ ounces of liquor.    Lose weight, if needed.  Being overweight increases your risk of certain health conditions. These include heart disease, high blood pressure, type 2 diabetes, and certain types of cancer.    Protect your skin.  Do not sunbathe or use tanning beds. Use sunscreen with a SPF 15 or higher. Apply sunscreen at least 15 minutes before you go outside. Reapply sunscreen every 2 hours. Wear protective clothing, hats, and sunglasses when you are outside.    Drive safely.  Always wear your seatbelt. Make sure everyone in your car wears a seatbelt. A seatbelt can save your life if you are in an accident. Do not use your cell phone when you are driving. This could distract you and cause an accident. Pull over if you need to make a call or send a text message.    Practice safe sex.  Use latex condoms if are sexually active and have more than one partner. Your healthcare provider may recommend screening tests for sexually transmitted infections (STIs).    Wear helmets, lifejackets, and protective gear.  Always wear a helmet when you ride a bike or motorcycle, go skiing, or play sports that could cause a  head injury. Wear protective equipment when you play sports. Wear a lifejacket when you are on a boat or doing water sports.    © Copyright [employer] 2023 Information is for End User's use only and may not be sold, redistributed or otherwise used for commercial purposes.  The above information is an  only. It is not intended as medical advice for individual conditions or treatments. Talk to your doctor, nurse or pharmacist before following any medical regimen to see if it is safe and effective for you.     negative

## 2025-04-09 NOTE — PROGRESS NOTE BEHAVIORAL HEALTH - PROBLEM SELECTOR PLAN 3
Negative
pt is only intermittently taking medication  and now refusing all medications
pt noncompliance with medication